# Patient Record
Sex: FEMALE | Race: OTHER | NOT HISPANIC OR LATINO
[De-identification: names, ages, dates, MRNs, and addresses within clinical notes are randomized per-mention and may not be internally consistent; named-entity substitution may affect disease eponyms.]

---

## 2018-11-29 PROBLEM — Z00.00 ENCOUNTER FOR PREVENTIVE HEALTH EXAMINATION: Status: ACTIVE | Noted: 2018-11-29

## 2019-03-13 ENCOUNTER — APPOINTMENT (OUTPATIENT)
Dept: ENDOCRINOLOGY | Facility: CLINIC | Age: 79
End: 2019-03-13

## 2019-03-21 ENCOUNTER — APPOINTMENT (OUTPATIENT)
Dept: ENDOCRINOLOGY | Facility: CLINIC | Age: 79
End: 2019-03-21

## 2020-01-01 ENCOUNTER — TRANSCRIPTION ENCOUNTER (OUTPATIENT)
Age: 80
End: 2020-01-01

## 2020-01-01 ENCOUNTER — APPOINTMENT (OUTPATIENT)
Dept: UROLOGY | Facility: CLINIC | Age: 80
End: 2020-01-01
Payer: MEDICARE

## 2020-01-01 ENCOUNTER — INPATIENT (INPATIENT)
Facility: HOSPITAL | Age: 80
LOS: 5 days | Discharge: NURSING, REHAB OR LONG-TERM CARE | DRG: 312 | End: 2020-02-05
Attending: INTERNAL MEDICINE | Admitting: INTERNAL MEDICINE
Payer: MEDICARE

## 2020-01-01 ENCOUNTER — APPOINTMENT (OUTPATIENT)
Dept: NEUROLOGY | Facility: CLINIC | Age: 80
End: 2020-01-01
Payer: MEDICARE

## 2020-01-01 ENCOUNTER — EMERGENCY (EMERGENCY)
Facility: HOSPITAL | Age: 80
LOS: 1 days | Discharge: HOME | End: 2020-01-01
Attending: STUDENT IN AN ORGANIZED HEALTH CARE EDUCATION/TRAINING PROGRAM
Payer: MEDICARE

## 2020-01-01 ENCOUNTER — INPATIENT (INPATIENT)
Facility: HOSPITAL | Age: 80
LOS: 2 days | Discharge: HOME | DRG: 69 | End: 2020-03-13
Attending: INTERNAL MEDICINE | Admitting: INTERNAL MEDICINE
Payer: MEDICARE

## 2020-01-01 ENCOUNTER — APPOINTMENT (OUTPATIENT)
Dept: NEUROLOGY | Facility: CLINIC | Age: 80
End: 2020-01-01

## 2020-01-01 ENCOUNTER — EMERGENCY (EMERGENCY)
Facility: HOSPITAL | Age: 80
LOS: 1 days | Discharge: HOME | End: 2020-01-01
Attending: EMERGENCY MEDICINE
Payer: MEDICARE

## 2020-01-01 ENCOUNTER — INPATIENT (INPATIENT)
Facility: HOSPITAL | Age: 80
LOS: 3 days | Discharge: NURSING, REHAB OR LONG-TERM CARE | DRG: 948 | End: 2020-11-30
Attending: STUDENT IN AN ORGANIZED HEALTH CARE EDUCATION/TRAINING PROGRAM | Admitting: STUDENT IN AN ORGANIZED HEALTH CARE EDUCATION/TRAINING PROGRAM
Payer: MEDICARE

## 2020-01-01 ENCOUNTER — INPATIENT (INPATIENT)
Facility: HOSPITAL | Age: 80
LOS: 7 days | Discharge: HOME | DRG: 291 | End: 2020-11-25
Attending: INTERNAL MEDICINE | Admitting: INTERNAL MEDICINE
Payer: MEDICARE

## 2020-01-01 ENCOUNTER — APPOINTMENT (OUTPATIENT)
Age: 80
End: 2020-01-01
Payer: MEDICARE

## 2020-01-01 VITALS
DIASTOLIC BLOOD PRESSURE: 63 MMHG | HEART RATE: 70 BPM | RESPIRATION RATE: 20 BRPM | SYSTOLIC BLOOD PRESSURE: 131 MMHG | OXYGEN SATURATION: 88 % | HEIGHT: 55 IN | TEMPERATURE: 98 F

## 2020-01-01 VITALS
HEART RATE: 60 BPM | BODY MASS INDEX: 40.93 KG/M2 | WEIGHT: 195 LBS | HEIGHT: 58 IN | DIASTOLIC BLOOD PRESSURE: 60 MMHG | TEMPERATURE: 97 F | SYSTOLIC BLOOD PRESSURE: 117 MMHG

## 2020-01-01 VITALS
HEART RATE: 78 BPM | OXYGEN SATURATION: 99 % | DIASTOLIC BLOOD PRESSURE: 96 MMHG | RESPIRATION RATE: 18 BRPM | TEMPERATURE: 99 F | SYSTOLIC BLOOD PRESSURE: 142 MMHG

## 2020-01-01 VITALS
HEART RATE: 87 BPM | OXYGEN SATURATION: 95 % | SYSTOLIC BLOOD PRESSURE: 106 MMHG | RESPIRATION RATE: 18 BRPM | TEMPERATURE: 98 F | DIASTOLIC BLOOD PRESSURE: 61 MMHG

## 2020-01-01 VITALS
OXYGEN SATURATION: 94 % | RESPIRATION RATE: 18 BRPM | DIASTOLIC BLOOD PRESSURE: 72 MMHG | WEIGHT: 210.1 LBS | HEIGHT: 55 IN | SYSTOLIC BLOOD PRESSURE: 143 MMHG | HEART RATE: 93 BPM

## 2020-01-01 VITALS
RESPIRATION RATE: 18 BRPM | DIASTOLIC BLOOD PRESSURE: 75 MMHG | SYSTOLIC BLOOD PRESSURE: 115 MMHG | TEMPERATURE: 98 F | HEART RATE: 78 BPM | OXYGEN SATURATION: 99 %

## 2020-01-01 VITALS
RESPIRATION RATE: 18 BRPM | SYSTOLIC BLOOD PRESSURE: 92 MMHG | OXYGEN SATURATION: 96 % | HEART RATE: 56 BPM | DIASTOLIC BLOOD PRESSURE: 41 MMHG | TEMPERATURE: 98 F

## 2020-01-01 VITALS
OXYGEN SATURATION: 99 % | HEIGHT: 55 IN | DIASTOLIC BLOOD PRESSURE: 76 MMHG | RESPIRATION RATE: 18 BRPM | TEMPERATURE: 98 F | HEART RATE: 82 BPM | SYSTOLIC BLOOD PRESSURE: 123 MMHG | WEIGHT: 220.02 LBS

## 2020-01-01 VITALS
HEART RATE: 63 BPM | DIASTOLIC BLOOD PRESSURE: 74 MMHG | SYSTOLIC BLOOD PRESSURE: 176 MMHG | RESPIRATION RATE: 17 BRPM | OXYGEN SATURATION: 96 % | WEIGHT: 199.96 LBS | TEMPERATURE: 98 F

## 2020-01-01 VITALS — HEART RATE: 84 BPM | DIASTOLIC BLOOD PRESSURE: 63 MMHG | WEIGHT: 195 LBS | SYSTOLIC BLOOD PRESSURE: 119 MMHG

## 2020-01-01 VITALS
SYSTOLIC BLOOD PRESSURE: 116 MMHG | HEART RATE: 73 BPM | RESPIRATION RATE: 18 BRPM | TEMPERATURE: 99 F | DIASTOLIC BLOOD PRESSURE: 72 MMHG | OXYGEN SATURATION: 97 %

## 2020-01-01 VITALS
SYSTOLIC BLOOD PRESSURE: 126 MMHG | HEIGHT: 55 IN | OXYGEN SATURATION: 95 % | TEMPERATURE: 98 F | RESPIRATION RATE: 17 BRPM | DIASTOLIC BLOOD PRESSURE: 76 MMHG | HEART RATE: 116 BPM

## 2020-01-01 VITALS — TEMPERATURE: 97.8 F

## 2020-01-01 VITALS
OXYGEN SATURATION: 95 % | TEMPERATURE: 98 F | SYSTOLIC BLOOD PRESSURE: 141 MMHG | RESPIRATION RATE: 18 BRPM | HEART RATE: 63 BPM | DIASTOLIC BLOOD PRESSURE: 45 MMHG

## 2020-01-01 VITALS — DIASTOLIC BLOOD PRESSURE: 54 MMHG | SYSTOLIC BLOOD PRESSURE: 97 MMHG

## 2020-01-01 DIAGNOSIS — I10 ESSENTIAL (PRIMARY) HYPERTENSION: ICD-10-CM

## 2020-01-01 DIAGNOSIS — F03.90 UNSPECIFIED DEMENTIA, UNSPECIFIED SEVERITY, WITHOUT BEHAVIORAL DISTURBANCE, PSYCHOTIC DISTURBANCE, MOOD DISTURBANCE, AND ANXIETY: ICD-10-CM

## 2020-01-01 DIAGNOSIS — F03.90 UNSPECIFIED DEMENTIA WITHOUT BEHAVIORAL DISTURBANCE: ICD-10-CM

## 2020-01-01 DIAGNOSIS — E11.9 TYPE 2 DIABETES MELLITUS WITHOUT COMPLICATIONS: ICD-10-CM

## 2020-01-01 DIAGNOSIS — J18.9 PNEUMONIA, UNSPECIFIED ORGANISM: ICD-10-CM

## 2020-01-01 DIAGNOSIS — I48.91 UNSPECIFIED ATRIAL FIBRILLATION: ICD-10-CM

## 2020-01-01 DIAGNOSIS — Z80.42 FAMILY HISTORY OF MALIGNANT NEOPLASM OF PROSTATE: ICD-10-CM

## 2020-01-01 DIAGNOSIS — G45.9 TRANSIENT CEREBRAL ISCHEMIC ATTACK, UNSPECIFIED: ICD-10-CM

## 2020-01-01 DIAGNOSIS — E11.9 TYPE 2 DIABETES MELLITUS W/OUT COMPLICATIONS: ICD-10-CM

## 2020-01-01 DIAGNOSIS — Z83.3 FAMILY HISTORY OF DIABETES MELLITUS: ICD-10-CM

## 2020-01-01 DIAGNOSIS — I50.33 ACUTE ON CHRONIC DIASTOLIC (CONGESTIVE) HEART FAILURE: ICD-10-CM

## 2020-01-01 DIAGNOSIS — R79.1 ABNORMAL COAGULATION PROFILE: ICD-10-CM

## 2020-01-01 DIAGNOSIS — Z98.891 HISTORY OF UTERINE SCAR FROM PREVIOUS SURGERY: Chronic | ICD-10-CM

## 2020-01-01 DIAGNOSIS — Z78.9 OTHER SPECIFIED HEALTH STATUS: ICD-10-CM

## 2020-01-01 DIAGNOSIS — N18.9 CHRONIC KIDNEY DISEASE, UNSPECIFIED: ICD-10-CM

## 2020-01-01 DIAGNOSIS — R60.9 EDEMA, UNSPECIFIED: ICD-10-CM

## 2020-01-01 DIAGNOSIS — Z02.9 ENCOUNTER FOR ADMINISTRATIVE EXAMINATIONS, UNSPECIFIED: ICD-10-CM

## 2020-01-01 DIAGNOSIS — E87.6 HYPOKALEMIA: ICD-10-CM

## 2020-01-01 DIAGNOSIS — E78.5 HYPERLIPIDEMIA, UNSPECIFIED: ICD-10-CM
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
## 2020-01-01 DIAGNOSIS — Z29.9 ENCOUNTER FOR PROPHYLACTIC MEASURES, UNSPECIFIED: ICD-10-CM

## 2020-01-01 DIAGNOSIS — Z82.3 FAMILY HISTORY OF STROKE: ICD-10-CM

## 2020-01-01 DIAGNOSIS — N28.89 OTHER SPECIFIED DISORDERS OF KIDNEY AND URETER: ICD-10-CM

## 2020-01-01 DIAGNOSIS — Z82.49 FAMILY HISTORY OF ISCHEMIC HEART DISEASE AND OTHER DISEASES OF THE CIRCULATORY SYSTEM: ICD-10-CM

## 2020-01-01 DIAGNOSIS — R55 SYNCOPE AND COLLAPSE: ICD-10-CM

## 2020-01-01 DIAGNOSIS — M25.562 PAIN IN LEFT KNEE: ICD-10-CM

## 2020-01-01 DIAGNOSIS — J96.00 ACUTE RESPIRATORY FAILURE, UNSPECIFIED WHETHER WITH HYPOXIA OR HYPERCAPNIA: ICD-10-CM

## 2020-01-01 DIAGNOSIS — F01.50 VASCULAR DEMENTIA W/OUT BEHAVIORAL DISTURBANCE: ICD-10-CM

## 2020-01-01 DIAGNOSIS — G31.84 MILD COGNITIVE IMPAIRMENT, SO STATED: ICD-10-CM

## 2020-01-01 DIAGNOSIS — Z82.0 FAMILY HISTORY OF EPILEPSY AND OTHER DISEASES OF THE NERVOUS SYSTEM: ICD-10-CM

## 2020-01-01 DIAGNOSIS — E78.00 PURE HYPERCHOLESTEROLEMIA, UNSPECIFIED: ICD-10-CM

## 2020-01-01 DIAGNOSIS — R53.1 WEAKNESS: ICD-10-CM

## 2020-01-01 DIAGNOSIS — I50.9 HEART FAILURE, UNSPECIFIED: ICD-10-CM

## 2020-01-01 LAB
-  AMIKACIN: SIGNIFICANT CHANGE UP
-  AMPICILLIN/SULBACTAM: SIGNIFICANT CHANGE UP
-  AMPICILLIN: SIGNIFICANT CHANGE UP
-  AMPICILLIN: SIGNIFICANT CHANGE UP
-  AZTREONAM: SIGNIFICANT CHANGE UP
-  CEFAZOLIN: SIGNIFICANT CHANGE UP
-  CEFEPIME: SIGNIFICANT CHANGE UP
-  CEFOXITIN: SIGNIFICANT CHANGE UP
-  CEFTRIAXONE: SIGNIFICANT CHANGE UP
-  CIPROFLOXACIN: SIGNIFICANT CHANGE UP
-  CIPROFLOXACIN: SIGNIFICANT CHANGE UP
-  GENTAMICIN: SIGNIFICANT CHANGE UP
-  IMIPENEM: SIGNIFICANT CHANGE UP
-  LEVOFLOXACIN: SIGNIFICANT CHANGE UP
-  LEVOFLOXACIN: SIGNIFICANT CHANGE UP
-  MEROPENEM: SIGNIFICANT CHANGE UP
-  NITROFURANTOIN: SIGNIFICANT CHANGE UP
-  NITROFURANTOIN: SIGNIFICANT CHANGE UP
-  PIPERACILLIN/TAZOBACTAM: SIGNIFICANT CHANGE UP
-  TETRACYCLINE: SIGNIFICANT CHANGE UP
-  TIGECYCLINE: SIGNIFICANT CHANGE UP
-  TOBRAMYCIN: SIGNIFICANT CHANGE UP
-  TRIMETHOPRIM/SULFAMETHOXAZOLE: SIGNIFICANT CHANGE UP
-  VANCOMYCIN: SIGNIFICANT CHANGE UP
24R-OH-CALCIDIOL SERPL-MCNC: 22.1 NG/ML — LOW (ref 30–80)
24R-OH-CALCIDIOL SERPL-MCNC: 54.1 NG/ML — SIGNIFICANT CHANGE UP (ref 30–80)
A1C WITH ESTIMATED AVERAGE GLUCOSE RESULT: 8.9 % — HIGH (ref 4–5.6)
ALBUMIN SERPL ELPH-MCNC: 2.2 G/DL — LOW (ref 3.5–5)
ALBUMIN SERPL ELPH-MCNC: 2.3 G/DL — LOW (ref 3.5–5)
ALBUMIN SERPL ELPH-MCNC: 2.7 G/DL — LOW (ref 3.5–5)
ALBUMIN SERPL ELPH-MCNC: 3.1 G/DL — LOW (ref 3.5–5)
ALBUMIN SERPL ELPH-MCNC: 3.1 G/DL — LOW (ref 3.5–5)
ALBUMIN SERPL ELPH-MCNC: 3.2 G/DL — LOW (ref 3.5–5)
ALBUMIN SERPL ELPH-MCNC: 3.2 G/DL — LOW (ref 3.5–5)
ALBUMIN SERPL ELPH-MCNC: 3.3 G/DL — LOW (ref 3.5–5)
ALBUMIN SERPL ELPH-MCNC: 3.4 G/DL — LOW (ref 3.5–5)
ALP SERPL-CCNC: 46 U/L — SIGNIFICANT CHANGE UP (ref 40–120)
ALP SERPL-CCNC: 51 U/L — SIGNIFICANT CHANGE UP (ref 40–120)
ALP SERPL-CCNC: 70 U/L — SIGNIFICANT CHANGE UP (ref 40–120)
ALP SERPL-CCNC: 73 U/L — SIGNIFICANT CHANGE UP (ref 40–120)
ALP SERPL-CCNC: 75 U/L — SIGNIFICANT CHANGE UP (ref 40–120)
ALP SERPL-CCNC: 76 U/L — SIGNIFICANT CHANGE UP (ref 40–120)
ALP SERPL-CCNC: 76 U/L — SIGNIFICANT CHANGE UP (ref 40–120)
ALP SERPL-CCNC: 78 U/L — SIGNIFICANT CHANGE UP (ref 40–120)
ALP SERPL-CCNC: 81 U/L — SIGNIFICANT CHANGE UP (ref 40–120)
ALP SERPL-CCNC: 83 U/L — SIGNIFICANT CHANGE UP (ref 40–120)
ALP SERPL-CCNC: 85 U/L — SIGNIFICANT CHANGE UP (ref 40–120)
ALP SERPL-CCNC: 87 U/L — SIGNIFICANT CHANGE UP (ref 40–120)
ALP SERPL-CCNC: 88 U/L — SIGNIFICANT CHANGE UP (ref 40–120)
ALP SERPL-CCNC: 93 U/L — SIGNIFICANT CHANGE UP (ref 40–120)
ALP SERPL-CCNC: 98 U/L — SIGNIFICANT CHANGE UP (ref 40–120)
ALT FLD-CCNC: 10 U/L DA — SIGNIFICANT CHANGE UP (ref 10–60)
ALT FLD-CCNC: 12 U/L DA — SIGNIFICANT CHANGE UP (ref 10–60)
ALT FLD-CCNC: 14 U/L DA — SIGNIFICANT CHANGE UP (ref 10–60)
ALT FLD-CCNC: 14 U/L DA — SIGNIFICANT CHANGE UP (ref 10–60)
ALT FLD-CCNC: 15 U/L DA — SIGNIFICANT CHANGE UP (ref 10–60)
ALT FLD-CCNC: 15 U/L DA — SIGNIFICANT CHANGE UP (ref 10–60)
ALT FLD-CCNC: 16 U/L DA — SIGNIFICANT CHANGE UP (ref 10–60)
ALT FLD-CCNC: 16 U/L DA — SIGNIFICANT CHANGE UP (ref 10–60)
ALT FLD-CCNC: 17 U/L DA — SIGNIFICANT CHANGE UP (ref 10–60)
ALT FLD-CCNC: 17 U/L DA — SIGNIFICANT CHANGE UP (ref 10–60)
ALT FLD-CCNC: 18 U/L DA — SIGNIFICANT CHANGE UP (ref 10–60)
ALT FLD-CCNC: 19 U/L DA — SIGNIFICANT CHANGE UP (ref 10–60)
ALT FLD-CCNC: 20 U/L DA — SIGNIFICANT CHANGE UP (ref 10–60)
ALT FLD-CCNC: 20 U/L DA — SIGNIFICANT CHANGE UP (ref 10–60)
ALT FLD-CCNC: 21 U/L DA — SIGNIFICANT CHANGE UP (ref 10–60)
ANION GAP SERPL CALC-SCNC: 10 MMOL/L — SIGNIFICANT CHANGE UP (ref 5–17)
ANION GAP SERPL CALC-SCNC: 10 MMOL/L — SIGNIFICANT CHANGE UP (ref 5–17)
ANION GAP SERPL CALC-SCNC: 12 MMOL/L — SIGNIFICANT CHANGE UP (ref 5–17)
ANION GAP SERPL CALC-SCNC: 12 MMOL/L — SIGNIFICANT CHANGE UP (ref 5–17)
ANION GAP SERPL CALC-SCNC: 13 MMOL/L — SIGNIFICANT CHANGE UP (ref 5–17)
ANION GAP SERPL CALC-SCNC: 17 MMOL/L
ANION GAP SERPL CALC-SCNC: 3 MMOL/L — LOW (ref 5–17)
ANION GAP SERPL CALC-SCNC: 4 MMOL/L — LOW (ref 5–17)
ANION GAP SERPL CALC-SCNC: 6 MMOL/L — SIGNIFICANT CHANGE UP (ref 5–17)
ANION GAP SERPL CALC-SCNC: 7 MMOL/L — SIGNIFICANT CHANGE UP (ref 5–17)
ANION GAP SERPL CALC-SCNC: 7 MMOL/L — SIGNIFICANT CHANGE UP (ref 5–17)
ANION GAP SERPL CALC-SCNC: 8 MMOL/L — SIGNIFICANT CHANGE UP (ref 5–17)
ANION GAP SERPL CALC-SCNC: 9 MMOL/L — SIGNIFICANT CHANGE UP (ref 5–17)
APPEARANCE UR: ABNORMAL
APPEARANCE UR: CLEAR — SIGNIFICANT CHANGE UP
APTT BLD: 133.1 SEC — CRITICAL HIGH (ref 27.5–35.5)
APTT BLD: 32.4 SEC — SIGNIFICANT CHANGE UP (ref 27.5–36.3)
APTT BLD: 35.6 SEC — SIGNIFICANT CHANGE UP (ref 27.5–36.3)
APTT BLD: 38.2 SEC — HIGH (ref 27.5–35.5)
APTT BLD: 39.4 SEC — HIGH (ref 27.5–35.5)
AST SERPL-CCNC: 10 U/L — SIGNIFICANT CHANGE UP (ref 10–40)
AST SERPL-CCNC: 12 U/L — SIGNIFICANT CHANGE UP (ref 10–40)
AST SERPL-CCNC: 13 U/L — SIGNIFICANT CHANGE UP (ref 10–40)
AST SERPL-CCNC: 13 U/L — SIGNIFICANT CHANGE UP (ref 10–40)
AST SERPL-CCNC: 14 U/L — SIGNIFICANT CHANGE UP (ref 10–40)
AST SERPL-CCNC: 15 U/L — SIGNIFICANT CHANGE UP (ref 10–40)
AST SERPL-CCNC: 16 U/L — SIGNIFICANT CHANGE UP (ref 10–40)
AST SERPL-CCNC: 16 U/L — SIGNIFICANT CHANGE UP (ref 10–40)
AST SERPL-CCNC: 17 U/L — SIGNIFICANT CHANGE UP (ref 10–40)
AST SERPL-CCNC: 19 U/L — SIGNIFICANT CHANGE UP (ref 10–40)
AST SERPL-CCNC: 21 U/L — SIGNIFICANT CHANGE UP (ref 10–40)
AST SERPL-CCNC: 21 U/L — SIGNIFICANT CHANGE UP (ref 10–40)
AST SERPL-CCNC: 38 U/L — SIGNIFICANT CHANGE UP (ref 10–40)
BACTERIA # UR AUTO: ABNORMAL /HPF
BASE EXCESS BLDA CALC-SCNC: 5.1 MMOL/L — HIGH (ref -2–2)
BASE EXCESS BLDV CALC-SCNC: -0.5 MMOL/L — SIGNIFICANT CHANGE UP (ref -2–2)
BASOPHILS # BLD AUTO: 0.01 K/UL — SIGNIFICANT CHANGE UP (ref 0–0.2)
BASOPHILS # BLD AUTO: 0.02 K/UL — SIGNIFICANT CHANGE UP (ref 0–0.2)
BASOPHILS # BLD AUTO: 0.03 K/UL — SIGNIFICANT CHANGE UP (ref 0–0.2)
BASOPHILS # BLD AUTO: 0.03 K/UL — SIGNIFICANT CHANGE UP (ref 0–0.2)
BASOPHILS # BLD AUTO: 0.04 K/UL — SIGNIFICANT CHANGE UP (ref 0–0.2)
BASOPHILS NFR BLD AUTO: 0.1 % — SIGNIFICANT CHANGE UP (ref 0–2)
BASOPHILS NFR BLD AUTO: 0.2 % — SIGNIFICANT CHANGE UP (ref 0–2)
BASOPHILS NFR BLD AUTO: 0.2 % — SIGNIFICANT CHANGE UP (ref 0–2)
BASOPHILS NFR BLD AUTO: 0.3 % — SIGNIFICANT CHANGE UP (ref 0–2)
BASOPHILS NFR BLD AUTO: 0.4 % — SIGNIFICANT CHANGE UP (ref 0–2)
BASOPHILS NFR BLD AUTO: 0.4 % — SIGNIFICANT CHANGE UP (ref 0–2)
BILIRUB DIRECT SERPL-MCNC: 0.1 MG/DL — SIGNIFICANT CHANGE UP (ref 0–0.2)
BILIRUB DIRECT SERPL-MCNC: 0.2 MG/DL — SIGNIFICANT CHANGE UP (ref 0–0.2)
BILIRUB INDIRECT FLD-MCNC: 0.4 MG/DL — SIGNIFICANT CHANGE UP (ref 0.2–1)
BILIRUB INDIRECT FLD-MCNC: 0.6 MG/DL — SIGNIFICANT CHANGE UP (ref 0.2–1)
BILIRUB SERPL-MCNC: 0.4 MG/DL — SIGNIFICANT CHANGE UP (ref 0.2–1.2)
BILIRUB SERPL-MCNC: 0.4 MG/DL — SIGNIFICANT CHANGE UP (ref 0.2–1.2)
BILIRUB SERPL-MCNC: 0.5 MG/DL — SIGNIFICANT CHANGE UP (ref 0.2–1.2)
BILIRUB SERPL-MCNC: 0.6 MG/DL — SIGNIFICANT CHANGE UP (ref 0.2–1.2)
BILIRUB SERPL-MCNC: 0.6 MG/DL — SIGNIFICANT CHANGE UP (ref 0.2–1.2)
BILIRUB SERPL-MCNC: 0.7 MG/DL — SIGNIFICANT CHANGE UP (ref 0.2–1.2)
BILIRUB SERPL-MCNC: 0.7 MG/DL — SIGNIFICANT CHANGE UP (ref 0.2–1.2)
BILIRUB SERPL-MCNC: 0.8 MG/DL — SIGNIFICANT CHANGE UP (ref 0.2–1.2)
BILIRUB SERPL-MCNC: 0.9 MG/DL — SIGNIFICANT CHANGE UP (ref 0.2–1.2)
BILIRUB UR-MCNC: NEGATIVE — SIGNIFICANT CHANGE UP
BLOOD GAS COMMENTS ARTERIAL: SIGNIFICANT CHANGE UP
BLOOD GAS COMMENTS, VENOUS: SIGNIFICANT CHANGE UP
BUN SERPL-MCNC: 11 MG/DL — SIGNIFICANT CHANGE UP (ref 7–18)
BUN SERPL-MCNC: 12 MG/DL — SIGNIFICANT CHANGE UP (ref 7–18)
BUN SERPL-MCNC: 13 MG/DL — SIGNIFICANT CHANGE UP (ref 7–18)
BUN SERPL-MCNC: 14 MG/DL
BUN SERPL-MCNC: 14 MG/DL — SIGNIFICANT CHANGE UP (ref 7–18)
BUN SERPL-MCNC: 14 MG/DL — SIGNIFICANT CHANGE UP (ref 7–18)
BUN SERPL-MCNC: 15 MG/DL — SIGNIFICANT CHANGE UP (ref 7–18)
BUN SERPL-MCNC: 16 MG/DL — SIGNIFICANT CHANGE UP (ref 7–18)
BUN SERPL-MCNC: 17 MG/DL — SIGNIFICANT CHANGE UP (ref 7–18)
BUN SERPL-MCNC: 18 MG/DL — SIGNIFICANT CHANGE UP (ref 7–18)
BUN SERPL-MCNC: 19 MG/DL — HIGH (ref 7–18)
BUN SERPL-MCNC: 20 MG/DL — HIGH (ref 7–18)
BUN SERPL-MCNC: 20 MG/DL — HIGH (ref 7–18)
BUN SERPL-MCNC: 7 MG/DL — SIGNIFICANT CHANGE UP (ref 7–18)
BUN SERPL-MCNC: 8 MG/DL — SIGNIFICANT CHANGE UP (ref 7–18)
BUN SERPL-MCNC: 9 MG/DL — SIGNIFICANT CHANGE UP (ref 7–18)
CALCIUM SERPL-MCNC: 8.3 MG/DL — LOW (ref 8.4–10.5)
CALCIUM SERPL-MCNC: 8.3 MG/DL — LOW (ref 8.4–10.5)
CALCIUM SERPL-MCNC: 8.5 MG/DL — SIGNIFICANT CHANGE UP (ref 8.4–10.5)
CALCIUM SERPL-MCNC: 8.6 MG/DL — SIGNIFICANT CHANGE UP (ref 8.4–10.5)
CALCIUM SERPL-MCNC: 8.7 MG/DL — SIGNIFICANT CHANGE UP (ref 8.4–10.5)
CALCIUM SERPL-MCNC: 8.8 MG/DL — SIGNIFICANT CHANGE UP (ref 8.4–10.5)
CALCIUM SERPL-MCNC: 8.9 MG/DL — SIGNIFICANT CHANGE UP (ref 8.4–10.5)
CALCIUM SERPL-MCNC: 8.9 MG/DL — SIGNIFICANT CHANGE UP (ref 8.4–10.5)
CALCIUM SERPL-MCNC: 9 MG/DL — SIGNIFICANT CHANGE UP (ref 8.4–10.5)
CALCIUM SERPL-MCNC: 9.2 MG/DL
CALCIUM SERPL-MCNC: 9.2 MG/DL — SIGNIFICANT CHANGE UP (ref 8.4–10.5)
CHLORIDE SERPL-SCNC: 100 MMOL/L — SIGNIFICANT CHANGE UP (ref 96–108)
CHLORIDE SERPL-SCNC: 101 MMOL/L — SIGNIFICANT CHANGE UP (ref 96–108)
CHLORIDE SERPL-SCNC: 102 MMOL/L
CHLORIDE SERPL-SCNC: 102 MMOL/L — SIGNIFICANT CHANGE UP (ref 96–108)
CHLORIDE SERPL-SCNC: 103 MMOL/L — SIGNIFICANT CHANGE UP (ref 96–108)
CHLORIDE SERPL-SCNC: 103 MMOL/L — SIGNIFICANT CHANGE UP (ref 96–108)
CHLORIDE SERPL-SCNC: 104 MMOL/L — SIGNIFICANT CHANGE UP (ref 96–108)
CHLORIDE SERPL-SCNC: 104 MMOL/L — SIGNIFICANT CHANGE UP (ref 96–108)
CHLORIDE SERPL-SCNC: 105 MMOL/L — SIGNIFICANT CHANGE UP (ref 96–108)
CHLORIDE SERPL-SCNC: 106 MMOL/L — SIGNIFICANT CHANGE UP (ref 96–108)
CHLORIDE SERPL-SCNC: 109 MMOL/L — HIGH (ref 96–108)
CHLORIDE SERPL-SCNC: 95 MMOL/L — LOW (ref 96–108)
CHLORIDE SERPL-SCNC: 96 MMOL/L — SIGNIFICANT CHANGE UP (ref 96–108)
CHLORIDE SERPL-SCNC: 96 MMOL/L — SIGNIFICANT CHANGE UP (ref 96–108)
CHLORIDE SERPL-SCNC: 97 MMOL/L — SIGNIFICANT CHANGE UP (ref 96–108)
CHLORIDE SERPL-SCNC: 99 MMOL/L — SIGNIFICANT CHANGE UP (ref 96–108)
CHOLEST SERPL-MCNC: 130 MG/DL — SIGNIFICANT CHANGE UP
CHOLEST SERPL-MCNC: 154 MG/DL — SIGNIFICANT CHANGE UP (ref 10–199)
CHOLEST SERPL-MCNC: 166 MG/DL — SIGNIFICANT CHANGE UP
CHOLEST SERPL-MCNC: 269 MG/DL — HIGH (ref 10–199)
CK MB BLD-MCNC: 1.4 % — SIGNIFICANT CHANGE UP (ref 0–3.5)
CK MB BLD-MCNC: 1.4 % — SIGNIFICANT CHANGE UP (ref 0–3.5)
CK MB BLD-MCNC: <1.6 % — SIGNIFICANT CHANGE UP (ref 0–3.5)
CK MB BLD-MCNC: <2.1 % — SIGNIFICANT CHANGE UP (ref 0–3.5)
CK MB CFR SERPL CALC: 2.5 NG/ML — SIGNIFICANT CHANGE UP (ref 0–3.6)
CK MB CFR SERPL CALC: 2.9 NG/ML — SIGNIFICANT CHANGE UP (ref 0–3.6)
CK MB CFR SERPL CALC: <1 NG/ML — SIGNIFICANT CHANGE UP (ref 0–3.6)
CK MB CFR SERPL CALC: <1 NG/ML — SIGNIFICANT CHANGE UP (ref 0–3.6)
CK SERPL-CCNC: 185 U/L — SIGNIFICANT CHANGE UP (ref 21–215)
CK SERPL-CCNC: 207 U/L — SIGNIFICANT CHANGE UP (ref 21–215)
CK SERPL-CCNC: 48 U/L — SIGNIFICANT CHANGE UP (ref 21–215)
CK SERPL-CCNC: 62 U/L — SIGNIFICANT CHANGE UP (ref 21–215)
CO2 SERPL-SCNC: 24 MMOL/L
CO2 SERPL-SCNC: 24 MMOL/L — SIGNIFICANT CHANGE UP (ref 22–31)
CO2 SERPL-SCNC: 25 MMOL/L — SIGNIFICANT CHANGE UP (ref 22–31)
CO2 SERPL-SCNC: 26 MMOL/L — SIGNIFICANT CHANGE UP (ref 22–31)
CO2 SERPL-SCNC: 27 MMOL/L — SIGNIFICANT CHANGE UP (ref 22–31)
CO2 SERPL-SCNC: 27 MMOL/L — SIGNIFICANT CHANGE UP (ref 22–31)
CO2 SERPL-SCNC: 28 MMOL/L — SIGNIFICANT CHANGE UP (ref 22–31)
CO2 SERPL-SCNC: 29 MMOL/L — SIGNIFICANT CHANGE UP (ref 22–31)
CO2 SERPL-SCNC: 30 MMOL/L — SIGNIFICANT CHANGE UP (ref 22–31)
CO2 SERPL-SCNC: 31 MMOL/L — SIGNIFICANT CHANGE UP (ref 22–31)
CO2 SERPL-SCNC: 31 MMOL/L — SIGNIFICANT CHANGE UP (ref 22–31)
CO2 SERPL-SCNC: 32 MMOL/L — HIGH (ref 22–31)
CO2 SERPL-SCNC: 33 MMOL/L — HIGH (ref 22–31)
CO2 SERPL-SCNC: 34 MMOL/L — HIGH (ref 22–31)
CO2 SERPL-SCNC: 37 MMOL/L — HIGH (ref 22–31)
CO2 SERPL-SCNC: 37 MMOL/L — HIGH (ref 22–31)
COLOR SPEC: YELLOW — SIGNIFICANT CHANGE UP
CREAT SERPL-MCNC: 0.59 MG/DL — SIGNIFICANT CHANGE UP (ref 0.5–1.3)
CREAT SERPL-MCNC: 0.64 MG/DL — SIGNIFICANT CHANGE UP (ref 0.5–1.3)
CREAT SERPL-MCNC: 0.64 MG/DL — SIGNIFICANT CHANGE UP (ref 0.5–1.3)
CREAT SERPL-MCNC: 0.65 MG/DL — SIGNIFICANT CHANGE UP (ref 0.5–1.3)
CREAT SERPL-MCNC: 0.69 MG/DL — SIGNIFICANT CHANGE UP (ref 0.5–1.3)
CREAT SERPL-MCNC: 0.7 MG/DL — SIGNIFICANT CHANGE UP (ref 0.5–1.3)
CREAT SERPL-MCNC: 0.72 MG/DL — SIGNIFICANT CHANGE UP (ref 0.5–1.3)
CREAT SERPL-MCNC: 0.75 MG/DL — SIGNIFICANT CHANGE UP (ref 0.5–1.3)
CREAT SERPL-MCNC: 0.77 MG/DL — SIGNIFICANT CHANGE UP (ref 0.5–1.3)
CREAT SERPL-MCNC: 0.79 MG/DL — SIGNIFICANT CHANGE UP (ref 0.5–1.3)
CREAT SERPL-MCNC: 0.81 MG/DL — SIGNIFICANT CHANGE UP (ref 0.5–1.3)
CREAT SERPL-MCNC: 0.83 MG/DL — SIGNIFICANT CHANGE UP (ref 0.5–1.3)
CREAT SERPL-MCNC: 0.84 MG/DL — SIGNIFICANT CHANGE UP (ref 0.5–1.3)
CREAT SERPL-MCNC: 0.85 MG/DL — SIGNIFICANT CHANGE UP (ref 0.5–1.3)
CREAT SERPL-MCNC: 0.87 MG/DL
CREAT SERPL-MCNC: 0.91 MG/DL — SIGNIFICANT CHANGE UP (ref 0.5–1.3)
CREAT SERPL-MCNC: 0.95 MG/DL — SIGNIFICANT CHANGE UP (ref 0.5–1.3)
CREAT SERPL-MCNC: 1.03 MG/DL — SIGNIFICANT CHANGE UP (ref 0.5–1.3)
CREAT SERPL-MCNC: 1.04 MG/DL — SIGNIFICANT CHANGE UP (ref 0.5–1.3)
CREAT SERPL-MCNC: 1.06 MG/DL — SIGNIFICANT CHANGE UP (ref 0.5–1.3)
CREAT SERPL-MCNC: 1.12 MG/DL — SIGNIFICANT CHANGE UP (ref 0.5–1.3)
CREAT SERPL-MCNC: 1.19 MG/DL — SIGNIFICANT CHANGE UP (ref 0.5–1.3)
CREAT SERPL-MCNC: 1.38 MG/DL — HIGH (ref 0.5–1.3)
CRP SERPL-MCNC: 1.28 MG/DL — HIGH (ref 0–0.4)
CRP SERPL-MCNC: 26.5 MG/DL — HIGH (ref 0–0.4)
CULTURE RESULTS: SIGNIFICANT CHANGE UP
D DIMER BLD IA.RAPID-MCNC: 201 NG/ML DDU — SIGNIFICANT CHANGE UP
DIFF PNL FLD: ABNORMAL
DIFF PNL FLD: NEGATIVE — SIGNIFICANT CHANGE UP
EOSINOPHIL # BLD AUTO: 0 K/UL — SIGNIFICANT CHANGE UP (ref 0–0.5)
EOSINOPHIL # BLD AUTO: 0.01 K/UL — SIGNIFICANT CHANGE UP (ref 0–0.5)
EOSINOPHIL # BLD AUTO: 0.01 K/UL — SIGNIFICANT CHANGE UP (ref 0–0.5)
EOSINOPHIL NFR BLD AUTO: 0 % — SIGNIFICANT CHANGE UP (ref 0–6)
EOSINOPHIL NFR BLD AUTO: 0.1 % — SIGNIFICANT CHANGE UP (ref 0–6)
EOSINOPHIL NFR BLD AUTO: 0.1 % — SIGNIFICANT CHANGE UP (ref 0–6)
EPI CELLS # UR: ABNORMAL /HPF
ERYTHROCYTE [SEDIMENTATION RATE] IN BLOOD: 36 MM/HR — HIGH (ref 0–20)
ERYTHROCYTE [SEDIMENTATION RATE] IN BLOOD: 75 MM/HR — HIGH (ref 0–20)
ESTIMATED AVERAGE GLUCOSE: 209 MG/DL — HIGH (ref 68–114)
FERRITIN SERPL-MCNC: 442 NG/ML — HIGH (ref 15–150)
FERRITIN SERPL-MCNC: 62 NG/ML — SIGNIFICANT CHANGE UP (ref 15–150)
FLU A RESULT: SIGNIFICANT CHANGE UP
FLU A RESULT: SIGNIFICANT CHANGE UP
FLUAV AG NPH QL: SIGNIFICANT CHANGE UP
FLUBV AG NPH QL: SIGNIFICANT CHANGE UP
FOLATE SERPL-MCNC: 6.7 NG/ML — SIGNIFICANT CHANGE UP
FOLATE SERPL-MCNC: 7.5 NG/ML — SIGNIFICANT CHANGE UP
GLUCOSE BLDC GLUCOMTR-MCNC: 111 MG/DL — HIGH (ref 70–99)
GLUCOSE BLDC GLUCOMTR-MCNC: 120 MG/DL — HIGH (ref 70–99)
GLUCOSE BLDC GLUCOMTR-MCNC: 124 MG/DL — HIGH (ref 70–99)
GLUCOSE BLDC GLUCOMTR-MCNC: 129 MG/DL — HIGH (ref 70–99)
GLUCOSE BLDC GLUCOMTR-MCNC: 142 MG/DL — HIGH (ref 70–99)
GLUCOSE BLDC GLUCOMTR-MCNC: 146 MG/DL — HIGH (ref 70–99)
GLUCOSE BLDC GLUCOMTR-MCNC: 152 MG/DL — HIGH (ref 70–99)
GLUCOSE BLDC GLUCOMTR-MCNC: 154 MG/DL — HIGH (ref 70–99)
GLUCOSE BLDC GLUCOMTR-MCNC: 158 MG/DL — HIGH (ref 70–99)
GLUCOSE BLDC GLUCOMTR-MCNC: 165 MG/DL — HIGH (ref 70–99)
GLUCOSE BLDC GLUCOMTR-MCNC: 167 MG/DL — HIGH (ref 70–99)
GLUCOSE BLDC GLUCOMTR-MCNC: 167 MG/DL — HIGH (ref 70–99)
GLUCOSE BLDC GLUCOMTR-MCNC: 168 MG/DL — HIGH (ref 70–99)
GLUCOSE BLDC GLUCOMTR-MCNC: 168 MG/DL — HIGH (ref 70–99)
GLUCOSE BLDC GLUCOMTR-MCNC: 170 MG/DL — HIGH (ref 70–99)
GLUCOSE BLDC GLUCOMTR-MCNC: 173 MG/DL — HIGH (ref 70–99)
GLUCOSE BLDC GLUCOMTR-MCNC: 173 MG/DL — HIGH (ref 70–99)
GLUCOSE BLDC GLUCOMTR-MCNC: 176 MG/DL — HIGH (ref 70–99)
GLUCOSE BLDC GLUCOMTR-MCNC: 177 MG/DL — HIGH (ref 70–99)
GLUCOSE BLDC GLUCOMTR-MCNC: 178 MG/DL — HIGH (ref 70–99)
GLUCOSE BLDC GLUCOMTR-MCNC: 180 MG/DL — HIGH (ref 70–99)
GLUCOSE BLDC GLUCOMTR-MCNC: 180 MG/DL — HIGH (ref 70–99)
GLUCOSE BLDC GLUCOMTR-MCNC: 181 MG/DL — HIGH (ref 70–99)
GLUCOSE BLDC GLUCOMTR-MCNC: 184 MG/DL — HIGH (ref 70–99)
GLUCOSE BLDC GLUCOMTR-MCNC: 184 MG/DL — HIGH (ref 70–99)
GLUCOSE BLDC GLUCOMTR-MCNC: 188 MG/DL — HIGH (ref 70–99)
GLUCOSE BLDC GLUCOMTR-MCNC: 189 MG/DL — HIGH (ref 70–99)
GLUCOSE BLDC GLUCOMTR-MCNC: 190 MG/DL — HIGH (ref 70–99)
GLUCOSE BLDC GLUCOMTR-MCNC: 192 MG/DL — HIGH (ref 70–99)
GLUCOSE BLDC GLUCOMTR-MCNC: 192 MG/DL — HIGH (ref 70–99)
GLUCOSE BLDC GLUCOMTR-MCNC: 195 MG/DL — HIGH (ref 70–99)
GLUCOSE BLDC GLUCOMTR-MCNC: 196 MG/DL — HIGH (ref 70–99)
GLUCOSE BLDC GLUCOMTR-MCNC: 196 MG/DL — HIGH (ref 70–99)
GLUCOSE BLDC GLUCOMTR-MCNC: 199 MG/DL — HIGH (ref 70–99)
GLUCOSE BLDC GLUCOMTR-MCNC: 203 MG/DL — HIGH (ref 70–99)
GLUCOSE BLDC GLUCOMTR-MCNC: 203 MG/DL — HIGH (ref 70–99)
GLUCOSE BLDC GLUCOMTR-MCNC: 205 MG/DL — HIGH (ref 70–99)
GLUCOSE BLDC GLUCOMTR-MCNC: 208 MG/DL — HIGH (ref 70–99)
GLUCOSE BLDC GLUCOMTR-MCNC: 209 MG/DL — HIGH (ref 70–99)
GLUCOSE BLDC GLUCOMTR-MCNC: 214 MG/DL — HIGH (ref 70–99)
GLUCOSE BLDC GLUCOMTR-MCNC: 214 MG/DL — HIGH (ref 70–99)
GLUCOSE BLDC GLUCOMTR-MCNC: 222 MG/DL — HIGH (ref 70–99)
GLUCOSE BLDC GLUCOMTR-MCNC: 223 MG/DL — HIGH (ref 70–99)
GLUCOSE BLDC GLUCOMTR-MCNC: 225 MG/DL — HIGH (ref 70–99)
GLUCOSE BLDC GLUCOMTR-MCNC: 232 MG/DL — HIGH (ref 70–99)
GLUCOSE BLDC GLUCOMTR-MCNC: 247 MG/DL — HIGH (ref 70–99)
GLUCOSE BLDC GLUCOMTR-MCNC: 251 MG/DL — HIGH (ref 70–99)
GLUCOSE BLDC GLUCOMTR-MCNC: 252 MG/DL — HIGH (ref 70–99)
GLUCOSE BLDC GLUCOMTR-MCNC: 252 MG/DL — HIGH (ref 70–99)
GLUCOSE BLDC GLUCOMTR-MCNC: 261 MG/DL — HIGH (ref 70–99)
GLUCOSE BLDC GLUCOMTR-MCNC: 265 MG/DL — HIGH (ref 70–99)
GLUCOSE BLDC GLUCOMTR-MCNC: 265 MG/DL — HIGH (ref 70–99)
GLUCOSE BLDC GLUCOMTR-MCNC: 266 MG/DL — HIGH (ref 70–99)
GLUCOSE BLDC GLUCOMTR-MCNC: 268 MG/DL — HIGH (ref 70–99)
GLUCOSE BLDC GLUCOMTR-MCNC: 275 MG/DL — HIGH (ref 70–99)
GLUCOSE BLDC GLUCOMTR-MCNC: 278 MG/DL — HIGH (ref 70–99)
GLUCOSE BLDC GLUCOMTR-MCNC: 284 MG/DL — HIGH (ref 70–99)
GLUCOSE BLDC GLUCOMTR-MCNC: 287 MG/DL — HIGH (ref 70–99)
GLUCOSE BLDC GLUCOMTR-MCNC: 290 MG/DL — HIGH (ref 70–99)
GLUCOSE BLDC GLUCOMTR-MCNC: 290 MG/DL — HIGH (ref 70–99)
GLUCOSE BLDC GLUCOMTR-MCNC: 305 MG/DL — HIGH (ref 70–99)
GLUCOSE BLDC GLUCOMTR-MCNC: 307 MG/DL — HIGH (ref 70–99)
GLUCOSE BLDC GLUCOMTR-MCNC: 310 MG/DL — HIGH (ref 70–99)
GLUCOSE BLDC GLUCOMTR-MCNC: 310 MG/DL — HIGH (ref 70–99)
GLUCOSE BLDC GLUCOMTR-MCNC: 338 MG/DL — HIGH (ref 70–99)
GLUCOSE BLDC GLUCOMTR-MCNC: 350 MG/DL — HIGH (ref 70–99)
GLUCOSE BLDC GLUCOMTR-MCNC: 362 MG/DL — HIGH (ref 70–99)
GLUCOSE BLDC GLUCOMTR-MCNC: 380 MG/DL — HIGH (ref 70–99)
GLUCOSE BLDC GLUCOMTR-MCNC: 382 MG/DL — HIGH (ref 70–99)
GLUCOSE BLDC GLUCOMTR-MCNC: 92 MG/DL — SIGNIFICANT CHANGE UP (ref 70–99)
GLUCOSE BLDC GLUCOMTR-MCNC: 98 MG/DL — SIGNIFICANT CHANGE UP (ref 70–99)
GLUCOSE SERPL-MCNC: 101 MG/DL — HIGH (ref 70–99)
GLUCOSE SERPL-MCNC: 111 MG/DL — HIGH (ref 70–99)
GLUCOSE SERPL-MCNC: 123 MG/DL — HIGH (ref 70–99)
GLUCOSE SERPL-MCNC: 149 MG/DL — HIGH (ref 70–99)
GLUCOSE SERPL-MCNC: 168 MG/DL — HIGH (ref 70–99)
GLUCOSE SERPL-MCNC: 175 MG/DL — HIGH (ref 70–99)
GLUCOSE SERPL-MCNC: 178 MG/DL — HIGH (ref 70–99)
GLUCOSE SERPL-MCNC: 186 MG/DL — HIGH (ref 70–99)
GLUCOSE SERPL-MCNC: 189 MG/DL — HIGH (ref 70–99)
GLUCOSE SERPL-MCNC: 190 MG/DL — HIGH (ref 70–99)
GLUCOSE SERPL-MCNC: 192 MG/DL — HIGH (ref 70–99)
GLUCOSE SERPL-MCNC: 204 MG/DL — HIGH (ref 70–99)
GLUCOSE SERPL-MCNC: 217 MG/DL — HIGH (ref 70–99)
GLUCOSE SERPL-MCNC: 218 MG/DL — HIGH (ref 70–99)
GLUCOSE SERPL-MCNC: 219 MG/DL — HIGH (ref 70–99)
GLUCOSE SERPL-MCNC: 220 MG/DL
GLUCOSE SERPL-MCNC: 237 MG/DL — HIGH (ref 70–99)
GLUCOSE SERPL-MCNC: 250 MG/DL — HIGH (ref 70–99)
GLUCOSE SERPL-MCNC: 264 MG/DL — HIGH (ref 70–99)
GLUCOSE SERPL-MCNC: 265 MG/DL — HIGH (ref 70–99)
GLUCOSE SERPL-MCNC: 282 MG/DL — HIGH (ref 70–99)
GLUCOSE SERPL-MCNC: 295 MG/DL — HIGH (ref 70–99)
GLUCOSE SERPL-MCNC: 295 MG/DL — HIGH (ref 70–99)
GLUCOSE SERPL-MCNC: 316 MG/DL — HIGH (ref 70–99)
GLUCOSE SERPL-MCNC: 339 MG/DL — HIGH (ref 70–99)
GLUCOSE SERPL-MCNC: 395 MG/DL — HIGH (ref 70–99)
GLUCOSE SERPL-MCNC: 97 MG/DL — SIGNIFICANT CHANGE UP (ref 70–99)
GLUCOSE UR QL: 1000 MG/DL
GLUCOSE UR QL: NEGATIVE — SIGNIFICANT CHANGE UP
HBA1C BLD-MCNC: 11.5 % — HIGH (ref 4–5.6)
HBA1C BLD-MCNC: 9.7 % — HIGH (ref 4–5.6)
HCO3 BLDA-SCNC: 31 MMOL/L — HIGH (ref 23–27)
HCO3 BLDV-SCNC: 25 MMOL/L — SIGNIFICANT CHANGE UP (ref 21–29)
HCT VFR BLD CALC: 38.1 % — SIGNIFICANT CHANGE UP (ref 34.5–45)
HCT VFR BLD CALC: 40.9 % — SIGNIFICANT CHANGE UP (ref 34.5–45)
HCT VFR BLD CALC: 41.1 % — SIGNIFICANT CHANGE UP (ref 34.5–45)
HCT VFR BLD CALC: 41.6 % — SIGNIFICANT CHANGE UP (ref 34.5–45)
HCT VFR BLD CALC: 41.9 % — SIGNIFICANT CHANGE UP (ref 34.5–45)
HCT VFR BLD CALC: 42 % — SIGNIFICANT CHANGE UP (ref 34.5–45)
HCT VFR BLD CALC: 42 % — SIGNIFICANT CHANGE UP (ref 34.5–45)
HCT VFR BLD CALC: 42.3 % — SIGNIFICANT CHANGE UP (ref 34.5–45)
HCT VFR BLD CALC: 42.5 % — SIGNIFICANT CHANGE UP (ref 34.5–45)
HCT VFR BLD CALC: 42.8 % — SIGNIFICANT CHANGE UP (ref 34.5–45)
HCT VFR BLD CALC: 43.4 % — SIGNIFICANT CHANGE UP (ref 34.5–45)
HCT VFR BLD CALC: 43.7 % — SIGNIFICANT CHANGE UP (ref 34.5–45)
HCT VFR BLD CALC: 44 % — SIGNIFICANT CHANGE UP (ref 34.5–45)
HCT VFR BLD CALC: 45.3 % — HIGH (ref 34.5–45)
HCT VFR BLD CALC: 45.4 % — HIGH (ref 34.5–45)
HCT VFR BLD CALC: 45.8 % — HIGH (ref 34.5–45)
HCT VFR BLD CALC: 46.2 % — HIGH (ref 34.5–45)
HCT VFR BLD CALC: 46.3 % — HIGH (ref 34.5–45)
HCT VFR BLD CALC: 46.6 % — HIGH (ref 34.5–45)
HCT VFR BLD CALC: 47.8 % — HIGH (ref 34.5–45)
HCT VFR BLD CALC: 47.9 % — HIGH (ref 34.5–45)
HCT VFR BLD CALC: 48.3 % — HIGH (ref 34.5–45)
HCT VFR BLD CALC: 48.9 % — HIGH (ref 34.5–45)
HDLC SERPL-MCNC: 42 MG/DL — LOW
HDLC SERPL-MCNC: 43 MG/DL — LOW
HDLC SERPL-MCNC: 54 MG/DL — SIGNIFICANT CHANGE UP
HDLC SERPL-MCNC: 60 MG/DL — SIGNIFICANT CHANGE UP
HGB BLD-MCNC: 12.1 G/DL — SIGNIFICANT CHANGE UP (ref 11.5–15.5)
HGB BLD-MCNC: 12.8 G/DL — SIGNIFICANT CHANGE UP (ref 11.5–15.5)
HGB BLD-MCNC: 13 G/DL — SIGNIFICANT CHANGE UP (ref 11.5–15.5)
HGB BLD-MCNC: 13.1 G/DL — SIGNIFICANT CHANGE UP (ref 11.5–15.5)
HGB BLD-MCNC: 13.2 G/DL — SIGNIFICANT CHANGE UP (ref 11.5–15.5)
HGB BLD-MCNC: 13.2 G/DL — SIGNIFICANT CHANGE UP (ref 11.5–15.5)
HGB BLD-MCNC: 13.3 G/DL — SIGNIFICANT CHANGE UP (ref 11.5–15.5)
HGB BLD-MCNC: 13.4 G/DL — SIGNIFICANT CHANGE UP (ref 11.5–15.5)
HGB BLD-MCNC: 13.5 G/DL — SIGNIFICANT CHANGE UP (ref 11.5–15.5)
HGB BLD-MCNC: 13.5 G/DL — SIGNIFICANT CHANGE UP (ref 11.5–15.5)
HGB BLD-MCNC: 13.7 G/DL — SIGNIFICANT CHANGE UP (ref 11.5–15.5)
HGB BLD-MCNC: 14 G/DL — SIGNIFICANT CHANGE UP (ref 11.5–15.5)
HGB BLD-MCNC: 14.1 G/DL — SIGNIFICANT CHANGE UP (ref 11.5–15.5)
HGB BLD-MCNC: 14.7 G/DL — SIGNIFICANT CHANGE UP (ref 11.5–15.5)
HGB BLD-MCNC: 14.7 G/DL — SIGNIFICANT CHANGE UP (ref 11.5–15.5)
HGB BLD-MCNC: 14.8 G/DL — SIGNIFICANT CHANGE UP (ref 11.5–15.5)
HGB BLD-MCNC: 14.8 G/DL — SIGNIFICANT CHANGE UP (ref 11.5–15.5)
HGB BLD-MCNC: 15 G/DL — SIGNIFICANT CHANGE UP (ref 11.5–15.5)
HGB BLD-MCNC: 15.2 G/DL — SIGNIFICANT CHANGE UP (ref 11.5–15.5)
HGB BLD-MCNC: 15.4 G/DL — SIGNIFICANT CHANGE UP (ref 11.5–15.5)
HGB BLD-MCNC: 15.7 G/DL — HIGH (ref 11.5–15.5)
HOROWITZ INDEX BLDA+IHG-RTO: 100 — SIGNIFICANT CHANGE UP
HOROWITZ INDEX BLDV+IHG-RTO: 21 — SIGNIFICANT CHANGE UP
IMM GRANULOCYTES NFR BLD AUTO: 0.2 % — SIGNIFICANT CHANGE UP (ref 0–1.5)
IMM GRANULOCYTES NFR BLD AUTO: 0.2 % — SIGNIFICANT CHANGE UP (ref 0–1.5)
IMM GRANULOCYTES NFR BLD AUTO: 0.3 % — SIGNIFICANT CHANGE UP (ref 0–1.5)
IMM GRANULOCYTES NFR BLD AUTO: 0.4 % — SIGNIFICANT CHANGE UP (ref 0–1.5)
IMM GRANULOCYTES NFR BLD AUTO: 0.5 % — SIGNIFICANT CHANGE UP (ref 0–1.5)
IMM GRANULOCYTES NFR BLD AUTO: 0.6 % — SIGNIFICANT CHANGE UP (ref 0–1.5)
INR BLD: 1.03 RATIO — SIGNIFICANT CHANGE UP (ref 0.88–1.16)
INR BLD: 1.08 RATIO — SIGNIFICANT CHANGE UP (ref 0.88–1.16)
INR BLD: 1.1 RATIO — SIGNIFICANT CHANGE UP (ref 0.88–1.16)
INR BLD: 1.67 RATIO — HIGH (ref 0.88–1.16)
INR BLD: 2.48 RATIO — HIGH (ref 0.88–1.16)
IRON SATN MFR SERPL: 12 % — LOW (ref 15–50)
IRON SATN MFR SERPL: 46 UG/DL — SIGNIFICANT CHANGE UP (ref 40–150)
KETONES UR-MCNC: ABNORMAL
KETONES UR-MCNC: NEGATIVE — SIGNIFICANT CHANGE UP
LACTATE SERPL-SCNC: 1.5 MMOL/L — SIGNIFICANT CHANGE UP (ref 0.7–2)
LACTATE SERPL-SCNC: 1.8 MMOL/L — SIGNIFICANT CHANGE UP (ref 0.7–2)
LACTATE SERPL-SCNC: 2.1 MMOL/L — HIGH (ref 0.7–2)
LACTATE SERPL-SCNC: 2.2 MMOL/L — HIGH (ref 0.7–2)
LACTATE SERPL-SCNC: 2.8 MMOL/L — HIGH (ref 0.7–2)
LACTATE SERPL-SCNC: 2.9 MMOL/L — HIGH (ref 0.7–2)
LDH SERPL L TO P-CCNC: 220 U/L — SIGNIFICANT CHANGE UP (ref 120–225)
LDH SERPL L TO P-CCNC: 222 U/L — SIGNIFICANT CHANGE UP (ref 120–225)
LEUKOCYTE ESTERASE UR-ACNC: ABNORMAL
LEUKOCYTE ESTERASE UR-ACNC: NEGATIVE — SIGNIFICANT CHANGE UP
LIDOCAIN IGE QN: 241 U/L — SIGNIFICANT CHANGE UP (ref 73–393)
LIDOCAIN IGE QN: 477 U/L — HIGH (ref 73–393)
LIPID PNL WITH DIRECT LDL SERPL: 182 MG/DL — SIGNIFICANT CHANGE UP
LIPID PNL WITH DIRECT LDL SERPL: 67 MG/DL — SIGNIFICANT CHANGE UP
LIPID PNL WITH DIRECT LDL SERPL: 82 MG/DL — SIGNIFICANT CHANGE UP
LIPID PNL WITH DIRECT LDL SERPL: 88 MG/DL — SIGNIFICANT CHANGE UP
LYMPHOCYTES # BLD AUTO: 0.52 K/UL — LOW (ref 1–3.3)
LYMPHOCYTES # BLD AUTO: 1.02 K/UL — SIGNIFICANT CHANGE UP (ref 1–3.3)
LYMPHOCYTES # BLD AUTO: 1.42 K/UL — SIGNIFICANT CHANGE UP (ref 1–3.3)
LYMPHOCYTES # BLD AUTO: 27.9 % — SIGNIFICANT CHANGE UP (ref 13–44)
LYMPHOCYTES # BLD AUTO: 3.28 K/UL — SIGNIFICANT CHANGE UP (ref 1–3.3)
LYMPHOCYTES # BLD AUTO: 3.29 K/UL — SIGNIFICANT CHANGE UP (ref 1–3.3)
LYMPHOCYTES # BLD AUTO: 3.44 K/UL — HIGH (ref 1–3.3)
LYMPHOCYTES # BLD AUTO: 3.44 K/UL — HIGH (ref 1–3.3)
LYMPHOCYTES # BLD AUTO: 3.77 K/UL — HIGH (ref 1–3.3)
LYMPHOCYTES # BLD AUTO: 31 % — SIGNIFICANT CHANGE UP (ref 13–44)
LYMPHOCYTES # BLD AUTO: 33.1 % — SIGNIFICANT CHANGE UP (ref 13–44)
LYMPHOCYTES # BLD AUTO: 34.5 % — SIGNIFICANT CHANGE UP (ref 13–44)
LYMPHOCYTES # BLD AUTO: 37.1 % — SIGNIFICANT CHANGE UP (ref 13–44)
LYMPHOCYTES # BLD AUTO: 7.1 % — LOW (ref 13–44)
LYMPHOCYTES # BLD AUTO: 8.4 % — LOW (ref 13–44)
LYMPHOCYTES # BLD AUTO: 8.8 % — LOW (ref 13–44)
MAGNESIUM SERPL-MCNC: 1.7 MG/DL — SIGNIFICANT CHANGE UP (ref 1.6–2.6)
MAGNESIUM SERPL-MCNC: 1.9 MG/DL — SIGNIFICANT CHANGE UP (ref 1.6–2.6)
MAGNESIUM SERPL-MCNC: 1.9 MG/DL — SIGNIFICANT CHANGE UP (ref 1.6–2.6)
MAGNESIUM SERPL-MCNC: 2 MG/DL — SIGNIFICANT CHANGE UP (ref 1.6–2.6)
MAGNESIUM SERPL-MCNC: 2.1 MG/DL — SIGNIFICANT CHANGE UP (ref 1.6–2.6)
MAGNESIUM SERPL-MCNC: 2.3 MG/DL — SIGNIFICANT CHANGE UP (ref 1.6–2.6)
MAGNESIUM SERPL-MCNC: 2.3 MG/DL — SIGNIFICANT CHANGE UP (ref 1.6–2.6)
MANUAL SMEAR VERIFICATION: SIGNIFICANT CHANGE UP
MCHC RBC-ENTMCNC: 29 PG — SIGNIFICANT CHANGE UP (ref 27–34)
MCHC RBC-ENTMCNC: 29 PG — SIGNIFICANT CHANGE UP (ref 27–34)
MCHC RBC-ENTMCNC: 29.1 PG — SIGNIFICANT CHANGE UP (ref 27–34)
MCHC RBC-ENTMCNC: 29.2 PG — SIGNIFICANT CHANGE UP (ref 27–34)
MCHC RBC-ENTMCNC: 29.3 PG — SIGNIFICANT CHANGE UP (ref 27–34)
MCHC RBC-ENTMCNC: 29.4 PG — SIGNIFICANT CHANGE UP (ref 27–34)
MCHC RBC-ENTMCNC: 29.5 PG — SIGNIFICANT CHANGE UP (ref 27–34)
MCHC RBC-ENTMCNC: 29.6 PG — SIGNIFICANT CHANGE UP (ref 27–34)
MCHC RBC-ENTMCNC: 29.7 PG — SIGNIFICANT CHANGE UP (ref 27–34)
MCHC RBC-ENTMCNC: 29.8 PG — SIGNIFICANT CHANGE UP (ref 27–34)
MCHC RBC-ENTMCNC: 29.9 PG — SIGNIFICANT CHANGE UP (ref 27–34)
MCHC RBC-ENTMCNC: 30 PG — SIGNIFICANT CHANGE UP (ref 27–34)
MCHC RBC-ENTMCNC: 30.1 PG — SIGNIFICANT CHANGE UP (ref 27–34)
MCHC RBC-ENTMCNC: 30.3 PG — SIGNIFICANT CHANGE UP (ref 27–34)
MCHC RBC-ENTMCNC: 31.1 GM/DL — LOW (ref 32–36)
MCHC RBC-ENTMCNC: 31.2 GM/DL — LOW (ref 32–36)
MCHC RBC-ENTMCNC: 31.3 GM/DL — LOW (ref 32–36)
MCHC RBC-ENTMCNC: 31.3 GM/DL — LOW (ref 32–36)
MCHC RBC-ENTMCNC: 31.4 GM/DL — LOW (ref 32–36)
MCHC RBC-ENTMCNC: 31.5 GM/DL — LOW (ref 32–36)
MCHC RBC-ENTMCNC: 31.5 GM/DL — LOW (ref 32–36)
MCHC RBC-ENTMCNC: 31.6 GM/DL — LOW (ref 32–36)
MCHC RBC-ENTMCNC: 31.7 GM/DL — LOW (ref 32–36)
MCHC RBC-ENTMCNC: 31.8 GM/DL — LOW (ref 32–36)
MCHC RBC-ENTMCNC: 31.9 GM/DL — LOW (ref 32–36)
MCHC RBC-ENTMCNC: 32 GM/DL — SIGNIFICANT CHANGE UP (ref 32–36)
MCHC RBC-ENTMCNC: 32.1 GM/DL — SIGNIFICANT CHANGE UP (ref 32–36)
MCHC RBC-ENTMCNC: 32.1 GM/DL — SIGNIFICANT CHANGE UP (ref 32–36)
MCHC RBC-ENTMCNC: 32.2 GM/DL — SIGNIFICANT CHANGE UP (ref 32–36)
MCHC RBC-ENTMCNC: 32.3 GM/DL — SIGNIFICANT CHANGE UP (ref 32–36)
MCHC RBC-ENTMCNC: 32.5 GM/DL — SIGNIFICANT CHANGE UP (ref 32–36)
MCHC RBC-ENTMCNC: 33 GM/DL — SIGNIFICANT CHANGE UP (ref 32–36)
MCHC RBC-ENTMCNC: 33 GM/DL — SIGNIFICANT CHANGE UP (ref 32–36)
MCV RBC AUTO: 89.4 FL — SIGNIFICANT CHANGE UP (ref 80–100)
MCV RBC AUTO: 89.4 FL — SIGNIFICANT CHANGE UP (ref 80–100)
MCV RBC AUTO: 89.9 FL — SIGNIFICANT CHANGE UP (ref 80–100)
MCV RBC AUTO: 90.5 FL — SIGNIFICANT CHANGE UP (ref 80–100)
MCV RBC AUTO: 90.9 FL — SIGNIFICANT CHANGE UP (ref 80–100)
MCV RBC AUTO: 91.4 FL — SIGNIFICANT CHANGE UP (ref 80–100)
MCV RBC AUTO: 91.4 FL — SIGNIFICANT CHANGE UP (ref 80–100)
MCV RBC AUTO: 91.9 FL — SIGNIFICANT CHANGE UP (ref 80–100)
MCV RBC AUTO: 92.2 FL — SIGNIFICANT CHANGE UP (ref 80–100)
MCV RBC AUTO: 93.3 FL — SIGNIFICANT CHANGE UP (ref 80–100)
MCV RBC AUTO: 93.3 FL — SIGNIFICANT CHANGE UP (ref 80–100)
MCV RBC AUTO: 93.6 FL — SIGNIFICANT CHANGE UP (ref 80–100)
MCV RBC AUTO: 93.8 FL — SIGNIFICANT CHANGE UP (ref 80–100)
MCV RBC AUTO: 93.8 FL — SIGNIFICANT CHANGE UP (ref 80–100)
MCV RBC AUTO: 94.1 FL — SIGNIFICANT CHANGE UP (ref 80–100)
MCV RBC AUTO: 94.4 FL — SIGNIFICANT CHANGE UP (ref 80–100)
MCV RBC AUTO: 94.4 FL — SIGNIFICANT CHANGE UP (ref 80–100)
MCV RBC AUTO: 94.7 FL — SIGNIFICANT CHANGE UP (ref 80–100)
MCV RBC AUTO: 94.8 FL — SIGNIFICANT CHANGE UP (ref 80–100)
MCV RBC AUTO: 94.8 FL — SIGNIFICANT CHANGE UP (ref 80–100)
MCV RBC AUTO: 95.1 FL — SIGNIFICANT CHANGE UP (ref 80–100)
MCV RBC AUTO: 95.6 FL — SIGNIFICANT CHANGE UP (ref 80–100)
MCV RBC AUTO: 96.2 FL — SIGNIFICANT CHANGE UP (ref 80–100)
METHOD TYPE: SIGNIFICANT CHANGE UP
METHOD TYPE: SIGNIFICANT CHANGE UP
MONOCYTES # BLD AUTO: 0.06 K/UL — SIGNIFICANT CHANGE UP (ref 0–0.9)
MONOCYTES # BLD AUTO: 0.67 K/UL — SIGNIFICANT CHANGE UP (ref 0–0.9)
MONOCYTES # BLD AUTO: 0.76 K/UL — SIGNIFICANT CHANGE UP (ref 0–0.9)
MONOCYTES # BLD AUTO: 0.82 K/UL — SIGNIFICANT CHANGE UP (ref 0–0.9)
MONOCYTES # BLD AUTO: 0.89 K/UL — SIGNIFICANT CHANGE UP (ref 0–0.9)
MONOCYTES # BLD AUTO: 0.9 K/UL — SIGNIFICANT CHANGE UP (ref 0–0.9)
MONOCYTES # BLD AUTO: 0.96 K/UL — HIGH (ref 0–0.9)
MONOCYTES # BLD AUTO: 1.28 K/UL — HIGH (ref 0–0.9)
MONOCYTES NFR BLD AUTO: 0.8 % — LOW (ref 2–14)
MONOCYTES NFR BLD AUTO: 4.2 % — SIGNIFICANT CHANGE UP (ref 2–14)
MONOCYTES NFR BLD AUTO: 7.6 % — SIGNIFICANT CHANGE UP (ref 2–14)
MONOCYTES NFR BLD AUTO: 7.9 % — SIGNIFICANT CHANGE UP (ref 2–14)
MONOCYTES NFR BLD AUTO: 8 % — SIGNIFICANT CHANGE UP (ref 2–14)
MONOCYTES NFR BLD AUTO: 9 % — SIGNIFICANT CHANGE UP (ref 2–14)
MONOCYTES NFR BLD AUTO: 9.3 % — SIGNIFICANT CHANGE UP (ref 2–14)
MONOCYTES NFR BLD AUTO: 9.5 % — SIGNIFICANT CHANGE UP (ref 2–14)
MRSA PCR RESULT.: SIGNIFICANT CHANGE UP
NEUTROPHILS # BLD AUTO: 10.09 K/UL — HIGH (ref 1.8–7.4)
NEUTROPHILS # BLD AUTO: 13.9 K/UL — HIGH (ref 1.8–7.4)
NEUTROPHILS # BLD AUTO: 4.69 K/UL — SIGNIFICANT CHANGE UP (ref 1.8–7.4)
NEUTROPHILS # BLD AUTO: 5.58 K/UL — SIGNIFICANT CHANGE UP (ref 1.8–7.4)
NEUTROPHILS # BLD AUTO: 5.83 K/UL — SIGNIFICANT CHANGE UP (ref 1.8–7.4)
NEUTROPHILS # BLD AUTO: 6.65 K/UL — SIGNIFICANT CHANGE UP (ref 1.8–7.4)
NEUTROPHILS # BLD AUTO: 6.69 K/UL — SIGNIFICANT CHANGE UP (ref 1.8–7.4)
NEUTROPHILS # BLD AUTO: 8.33 K/UL — HIGH (ref 1.8–7.4)
NEUTROPHILS NFR BLD AUTO: 53 % — SIGNIFICANT CHANGE UP (ref 43–77)
NEUTROPHILS NFR BLD AUTO: 55.9 % — SIGNIFICANT CHANGE UP (ref 43–77)
NEUTROPHILS NFR BLD AUTO: 58.7 % — SIGNIFICANT CHANGE UP (ref 43–77)
NEUTROPHILS NFR BLD AUTO: 60.2 % — SIGNIFICANT CHANGE UP (ref 43–77)
NEUTROPHILS NFR BLD AUTO: 61.8 % — SIGNIFICANT CHANGE UP (ref 43–77)
NEUTROPHILS NFR BLD AUTO: 83 % — HIGH (ref 43–77)
NEUTROPHILS NFR BLD AUTO: 86.1 % — HIGH (ref 43–77)
NEUTROPHILS NFR BLD AUTO: 91.5 % — HIGH (ref 43–77)
NITRITE UR-MCNC: NEGATIVE — SIGNIFICANT CHANGE UP
NON HDL CHOLESTEROL: 106 MG/DL — SIGNIFICANT CHANGE UP
NON HDL CHOLESTEROL: 87 MG/DL — SIGNIFICANT CHANGE UP
NRBC # BLD: 0 /100 WBCS — SIGNIFICANT CHANGE UP (ref 0–0)
NT-PROBNP SERPL-SCNC: 2012 PG/ML — HIGH (ref 0–450)
NT-PROBNP SERPL-SCNC: 2050 PG/ML — HIGH (ref 0–450)
NT-PROBNP SERPL-SCNC: 2164 PG/ML — HIGH (ref 0–450)
NT-PROBNP SERPL-SCNC: 324 PG/ML — SIGNIFICANT CHANGE UP (ref 0–450)
ORGANISM # SPEC MICROSCOPIC CNT: SIGNIFICANT CHANGE UP
PCO2 BLDA: 56 MMHG — HIGH (ref 32–46)
PCO2 BLDV: 45 MMHG — SIGNIFICANT CHANGE UP (ref 35–50)
PH BLDA: 7.37 — SIGNIFICANT CHANGE UP (ref 7.35–7.45)
PH BLDV: 7.36 — SIGNIFICANT CHANGE UP (ref 7.35–7.45)
PH UR: 5 — SIGNIFICANT CHANGE UP (ref 5–8)
PH UR: 7 — SIGNIFICANT CHANGE UP (ref 5–8)
PHOSPHATE SERPL-MCNC: 2.1 MG/DL — LOW (ref 2.5–4.5)
PHOSPHATE SERPL-MCNC: 2.3 MG/DL — LOW (ref 2.5–4.5)
PHOSPHATE SERPL-MCNC: 2.7 MG/DL — SIGNIFICANT CHANGE UP (ref 2.5–4.5)
PHOSPHATE SERPL-MCNC: 2.7 MG/DL — SIGNIFICANT CHANGE UP (ref 2.5–4.5)
PHOSPHATE SERPL-MCNC: 3.2 MG/DL — SIGNIFICANT CHANGE UP (ref 2.5–4.5)
PHOSPHATE SERPL-MCNC: 3.3 MG/DL — SIGNIFICANT CHANGE UP (ref 2.5–4.5)
PHOSPHATE SERPL-MCNC: 3.3 MG/DL — SIGNIFICANT CHANGE UP (ref 2.5–4.5)
PHOSPHATE SERPL-MCNC: 3.4 MG/DL — SIGNIFICANT CHANGE UP (ref 2.5–4.5)
PHOSPHATE SERPL-MCNC: 3.4 MG/DL — SIGNIFICANT CHANGE UP (ref 2.5–4.5)
PHOSPHATE SERPL-MCNC: 3.5 MG/DL — SIGNIFICANT CHANGE UP (ref 2.5–4.5)
PLAT MORPH BLD: NORMAL — SIGNIFICANT CHANGE UP
PLATELET # BLD AUTO: 197 K/UL — SIGNIFICANT CHANGE UP (ref 150–400)
PLATELET # BLD AUTO: 224 K/UL — SIGNIFICANT CHANGE UP (ref 150–400)
PLATELET # BLD AUTO: 226 K/UL — SIGNIFICANT CHANGE UP (ref 150–400)
PLATELET # BLD AUTO: 232 K/UL — SIGNIFICANT CHANGE UP (ref 150–400)
PLATELET # BLD AUTO: 237 K/UL — SIGNIFICANT CHANGE UP (ref 150–400)
PLATELET # BLD AUTO: 238 K/UL — SIGNIFICANT CHANGE UP (ref 150–400)
PLATELET # BLD AUTO: 241 K/UL — SIGNIFICANT CHANGE UP (ref 150–400)
PLATELET # BLD AUTO: 242 K/UL — SIGNIFICANT CHANGE UP (ref 150–400)
PLATELET # BLD AUTO: 250 K/UL — SIGNIFICANT CHANGE UP (ref 150–400)
PLATELET # BLD AUTO: 250 K/UL — SIGNIFICANT CHANGE UP (ref 150–400)
PLATELET # BLD AUTO: 252 K/UL — SIGNIFICANT CHANGE UP (ref 150–400)
PLATELET # BLD AUTO: 254 K/UL — SIGNIFICANT CHANGE UP (ref 150–400)
PLATELET # BLD AUTO: 258 K/UL — SIGNIFICANT CHANGE UP (ref 150–400)
PLATELET # BLD AUTO: 260 K/UL — SIGNIFICANT CHANGE UP (ref 150–400)
PLATELET # BLD AUTO: 262 K/UL — SIGNIFICANT CHANGE UP (ref 150–400)
PLATELET # BLD AUTO: 265 K/UL — SIGNIFICANT CHANGE UP (ref 150–400)
PLATELET # BLD AUTO: 270 K/UL — SIGNIFICANT CHANGE UP (ref 150–400)
PLATELET # BLD AUTO: 283 K/UL — SIGNIFICANT CHANGE UP (ref 150–400)
PLATELET # BLD AUTO: 292 K/UL — SIGNIFICANT CHANGE UP (ref 150–400)
PLATELET # BLD AUTO: 295 K/UL — SIGNIFICANT CHANGE UP (ref 150–400)
PLATELET # BLD AUTO: 296 K/UL — SIGNIFICANT CHANGE UP (ref 150–400)
PLATELET # BLD AUTO: 309 K/UL — SIGNIFICANT CHANGE UP (ref 150–400)
PLATELET # BLD AUTO: 349 K/UL — SIGNIFICANT CHANGE UP (ref 150–400)
PLATELET COUNT - ESTIMATE: NORMAL — SIGNIFICANT CHANGE UP
PO2 BLDA: 77 MMHG — SIGNIFICANT CHANGE UP (ref 74–108)
PO2 BLDV: 52 MMHG — HIGH (ref 25–45)
POTASSIUM SERPL-MCNC: 2.7 MMOL/L — CRITICAL LOW (ref 3.5–5.3)
POTASSIUM SERPL-MCNC: 3.1 MMOL/L — LOW (ref 3.5–5.3)
POTASSIUM SERPL-MCNC: 3.1 MMOL/L — LOW (ref 3.5–5.3)
POTASSIUM SERPL-MCNC: 3.3 MMOL/L — LOW (ref 3.5–5.3)
POTASSIUM SERPL-MCNC: 3.3 MMOL/L — LOW (ref 3.5–5.3)
POTASSIUM SERPL-MCNC: 3.4 MMOL/L — LOW (ref 3.5–5.3)
POTASSIUM SERPL-MCNC: 3.6 MMOL/L — SIGNIFICANT CHANGE UP (ref 3.5–5.3)
POTASSIUM SERPL-MCNC: 3.7 MMOL/L — SIGNIFICANT CHANGE UP (ref 3.5–5.3)
POTASSIUM SERPL-MCNC: 3.8 MMOL/L — SIGNIFICANT CHANGE UP (ref 3.5–5.3)
POTASSIUM SERPL-MCNC: 3.8 MMOL/L — SIGNIFICANT CHANGE UP (ref 3.5–5.3)
POTASSIUM SERPL-MCNC: 3.9 MMOL/L — SIGNIFICANT CHANGE UP (ref 3.5–5.3)
POTASSIUM SERPL-MCNC: 4 MMOL/L — SIGNIFICANT CHANGE UP (ref 3.5–5.3)
POTASSIUM SERPL-MCNC: 4.1 MMOL/L — SIGNIFICANT CHANGE UP (ref 3.5–5.3)
POTASSIUM SERPL-MCNC: 4.5 MMOL/L — SIGNIFICANT CHANGE UP (ref 3.5–5.3)
POTASSIUM SERPL-MCNC: 4.6 MMOL/L — SIGNIFICANT CHANGE UP (ref 3.5–5.3)
POTASSIUM SERPL-MCNC: 4.6 MMOL/L — SIGNIFICANT CHANGE UP (ref 3.5–5.3)
POTASSIUM SERPL-MCNC: 4.7 MMOL/L — SIGNIFICANT CHANGE UP (ref 3.5–5.3)
POTASSIUM SERPL-SCNC: 2.7 MMOL/L — CRITICAL LOW (ref 3.5–5.3)
POTASSIUM SERPL-SCNC: 3.1 MMOL/L — LOW (ref 3.5–5.3)
POTASSIUM SERPL-SCNC: 3.1 MMOL/L — LOW (ref 3.5–5.3)
POTASSIUM SERPL-SCNC: 3.3 MMOL/L — LOW (ref 3.5–5.3)
POTASSIUM SERPL-SCNC: 3.3 MMOL/L — LOW (ref 3.5–5.3)
POTASSIUM SERPL-SCNC: 3.4 MMOL/L — LOW (ref 3.5–5.3)
POTASSIUM SERPL-SCNC: 3.6 MMOL/L
POTASSIUM SERPL-SCNC: 3.6 MMOL/L — SIGNIFICANT CHANGE UP (ref 3.5–5.3)
POTASSIUM SERPL-SCNC: 3.7 MMOL/L — SIGNIFICANT CHANGE UP (ref 3.5–5.3)
POTASSIUM SERPL-SCNC: 3.8 MMOL/L — SIGNIFICANT CHANGE UP (ref 3.5–5.3)
POTASSIUM SERPL-SCNC: 3.8 MMOL/L — SIGNIFICANT CHANGE UP (ref 3.5–5.3)
POTASSIUM SERPL-SCNC: 3.9 MMOL/L — SIGNIFICANT CHANGE UP (ref 3.5–5.3)
POTASSIUM SERPL-SCNC: 4 MMOL/L — SIGNIFICANT CHANGE UP (ref 3.5–5.3)
POTASSIUM SERPL-SCNC: 4.1 MMOL/L — SIGNIFICANT CHANGE UP (ref 3.5–5.3)
POTASSIUM SERPL-SCNC: 4.5 MMOL/L — SIGNIFICANT CHANGE UP (ref 3.5–5.3)
POTASSIUM SERPL-SCNC: 4.6 MMOL/L — SIGNIFICANT CHANGE UP (ref 3.5–5.3)
POTASSIUM SERPL-SCNC: 4.6 MMOL/L — SIGNIFICANT CHANGE UP (ref 3.5–5.3)
POTASSIUM SERPL-SCNC: 4.7 MMOL/L — SIGNIFICANT CHANGE UP (ref 3.5–5.3)
PROCALCITONIN SERPL-MCNC: 0.08 NG/ML — SIGNIFICANT CHANGE UP (ref 0.02–0.1)
PROT SERPL-MCNC: 6.5 G/DL — SIGNIFICANT CHANGE UP (ref 6–8.3)
PROT SERPL-MCNC: 6.7 G/DL — SIGNIFICANT CHANGE UP (ref 6–8.3)
PROT SERPL-MCNC: 6.8 G/DL — SIGNIFICANT CHANGE UP (ref 6–8.3)
PROT SERPL-MCNC: 7 G/DL — SIGNIFICANT CHANGE UP (ref 6–8.3)
PROT SERPL-MCNC: 7.1 G/DL — SIGNIFICANT CHANGE UP (ref 6–8.3)
PROT SERPL-MCNC: 7.2 G/DL — SIGNIFICANT CHANGE UP (ref 6–8.3)
PROT SERPL-MCNC: 7.2 G/DL — SIGNIFICANT CHANGE UP (ref 6–8.3)
PROT SERPL-MCNC: 7.5 G/DL — SIGNIFICANT CHANGE UP (ref 6–8.3)
PROT SERPL-MCNC: 7.6 G/DL — SIGNIFICANT CHANGE UP (ref 6–8.3)
PROT SERPL-MCNC: 7.6 G/DL — SIGNIFICANT CHANGE UP (ref 6–8.3)
PROT SERPL-MCNC: 7.7 G/DL — SIGNIFICANT CHANGE UP (ref 6–8.3)
PROT SERPL-MCNC: 7.8 G/DL — SIGNIFICANT CHANGE UP (ref 6–8.3)
PROT SERPL-MCNC: 8 G/DL — SIGNIFICANT CHANGE UP (ref 6–8.3)
PROT UR-MCNC: 100
PROT UR-MCNC: 15
PROT UR-MCNC: NEGATIVE — SIGNIFICANT CHANGE UP
PROT UR-MCNC: NEGATIVE — SIGNIFICANT CHANGE UP
PROTHROM AB SERPL-ACNC: 11.4 SEC — SIGNIFICANT CHANGE UP (ref 10–12.9)
PROTHROM AB SERPL-ACNC: 12.3 SEC — SIGNIFICANT CHANGE UP (ref 10–12.9)
PROTHROM AB SERPL-ACNC: 12.8 SEC — SIGNIFICANT CHANGE UP (ref 10.6–13.6)
PROTHROM AB SERPL-ACNC: 19.4 SEC — HIGH (ref 10.6–13.6)
PROTHROM AB SERPL-ACNC: 27.8 SEC — HIGH (ref 10.6–13.6)
RAPID RVP RESULT: SIGNIFICANT CHANGE UP
RBC # BLD: 4.07 M/UL — SIGNIFICANT CHANGE UP (ref 3.8–5.2)
RBC # BLD: 4.3 M/UL — SIGNIFICANT CHANGE UP (ref 3.8–5.2)
RBC # BLD: 4.38 M/UL — SIGNIFICANT CHANGE UP (ref 3.8–5.2)
RBC # BLD: 4.42 M/UL — SIGNIFICANT CHANGE UP (ref 3.8–5.2)
RBC # BLD: 4.43 M/UL — SIGNIFICANT CHANGE UP (ref 3.8–5.2)
RBC # BLD: 4.44 M/UL — SIGNIFICANT CHANGE UP (ref 3.8–5.2)
RBC # BLD: 4.45 M/UL — SIGNIFICANT CHANGE UP (ref 3.8–5.2)
RBC # BLD: 4.52 M/UL — SIGNIFICANT CHANGE UP (ref 3.8–5.2)
RBC # BLD: 4.53 M/UL — SIGNIFICANT CHANGE UP (ref 3.8–5.2)
RBC # BLD: 4.57 M/UL — SIGNIFICANT CHANGE UP (ref 3.8–5.2)
RBC # BLD: 4.59 M/UL — SIGNIFICANT CHANGE UP (ref 3.8–5.2)
RBC # BLD: 4.65 M/UL — SIGNIFICANT CHANGE UP (ref 3.8–5.2)
RBC # BLD: 4.86 M/UL — SIGNIFICANT CHANGE UP (ref 3.8–5.2)
RBC # BLD: 4.91 M/UL — SIGNIFICANT CHANGE UP (ref 3.8–5.2)
RBC # BLD: 5.02 M/UL — SIGNIFICANT CHANGE UP (ref 3.8–5.2)
RBC # BLD: 5.04 M/UL — SIGNIFICANT CHANGE UP (ref 3.8–5.2)
RBC # BLD: 5.08 M/UL — SIGNIFICANT CHANGE UP (ref 3.8–5.2)
RBC # BLD: 5.08 M/UL — SIGNIFICANT CHANGE UP (ref 3.8–5.2)
RBC # BLD: 5.21 M/UL — HIGH (ref 3.8–5.2)
RBC # BLD: 5.24 M/UL — HIGH (ref 3.8–5.2)
RBC # BLD: 5.35 M/UL — HIGH (ref 3.8–5.2)
RBC # FLD: 12.3 % — SIGNIFICANT CHANGE UP (ref 10.3–14.5)
RBC # FLD: 12.5 % — SIGNIFICANT CHANGE UP (ref 10.3–14.5)
RBC # FLD: 12.6 % — SIGNIFICANT CHANGE UP (ref 10.3–14.5)
RBC # FLD: 12.8 % — SIGNIFICANT CHANGE UP (ref 10.3–14.5)
RBC # FLD: 12.9 % — SIGNIFICANT CHANGE UP (ref 10.3–14.5)
RBC # FLD: 13 % — SIGNIFICANT CHANGE UP (ref 10.3–14.5)
RBC # FLD: 13.1 % — SIGNIFICANT CHANGE UP (ref 10.3–14.5)
RBC # FLD: 13.2 % — SIGNIFICANT CHANGE UP (ref 10.3–14.5)
RBC # FLD: 14.4 % — SIGNIFICANT CHANGE UP (ref 10.3–14.5)
RBC BLD AUTO: NORMAL — SIGNIFICANT CHANGE UP
RBC CASTS # UR COMP ASSIST: ABNORMAL /HPF (ref 0–2)
RSV RESULT: SIGNIFICANT CHANGE UP
RSV RNA RESP QL NAA+PROBE: SIGNIFICANT CHANGE UP
S AUREUS DNA NOSE QL NAA+PROBE: SIGNIFICANT CHANGE UP
SAO2 % BLDA: 95 % — SIGNIFICANT CHANGE UP (ref 92–96)
SAO2 % BLDV: 87 % — SIGNIFICANT CHANGE UP (ref 67–88)
SARS-COV-2 IGG SERPL QL IA: NEGATIVE — SIGNIFICANT CHANGE UP
SARS-COV-2 IGG SERPL QL IA: NEGATIVE — SIGNIFICANT CHANGE UP
SARS-COV-2 IGM SERPL IA-ACNC: <0.1 INDEX — SIGNIFICANT CHANGE UP
SARS-COV-2 IGM SERPL IA-ACNC: <0.1 INDEX — SIGNIFICANT CHANGE UP
SARS-COV-2 RNA SPEC QL NAA+PROBE: DETECTED
SARS-COV-2 RNA SPEC QL NAA+PROBE: SIGNIFICANT CHANGE UP
SODIUM SERPL-SCNC: 135 MMOL/L — SIGNIFICANT CHANGE UP (ref 135–145)
SODIUM SERPL-SCNC: 136 MMOL/L — SIGNIFICANT CHANGE UP (ref 135–145)
SODIUM SERPL-SCNC: 136 MMOL/L — SIGNIFICANT CHANGE UP (ref 135–145)
SODIUM SERPL-SCNC: 137 MMOL/L — SIGNIFICANT CHANGE UP (ref 135–145)
SODIUM SERPL-SCNC: 138 MMOL/L — SIGNIFICANT CHANGE UP (ref 135–145)
SODIUM SERPL-SCNC: 139 MMOL/L — SIGNIFICANT CHANGE UP (ref 135–145)
SODIUM SERPL-SCNC: 140 MMOL/L — SIGNIFICANT CHANGE UP (ref 135–145)
SODIUM SERPL-SCNC: 140 MMOL/L — SIGNIFICANT CHANGE UP (ref 135–145)
SODIUM SERPL-SCNC: 141 MMOL/L — SIGNIFICANT CHANGE UP (ref 135–145)
SODIUM SERPL-SCNC: 142 MMOL/L — SIGNIFICANT CHANGE UP (ref 135–145)
SODIUM SERPL-SCNC: 143 MMOL/L
SODIUM SERPL-SCNC: 143 MMOL/L — SIGNIFICANT CHANGE UP (ref 135–145)
SODIUM SERPL-SCNC: 145 MMOL/L — SIGNIFICANT CHANGE UP (ref 135–145)
SP GR SPEC: 1.01 — SIGNIFICANT CHANGE UP (ref 1.01–1.02)
SP GR SPEC: 1.02 — SIGNIFICANT CHANGE UP (ref 1.01–1.02)
SPECIMEN SOURCE: SIGNIFICANT CHANGE UP
T PALLIDUM AB TITR SER: NEGATIVE — SIGNIFICANT CHANGE UP
TIBC SERPL-MCNC: 377 UG/DL — SIGNIFICANT CHANGE UP (ref 250–450)
TOTAL CHOLESTEROL/HDL RATIO MEASUREMENT: 3.7 RATIO — SIGNIFICANT CHANGE UP (ref 3.3–7.1)
TOTAL CHOLESTEROL/HDL RATIO MEASUREMENT: 5 RATIO — SIGNIFICANT CHANGE UP (ref 3.3–7.1)
TRIGL SERPL-MCNC: 100 MG/DL — SIGNIFICANT CHANGE UP
TRIGL SERPL-MCNC: 149 MG/DL — SIGNIFICANT CHANGE UP (ref 10–149)
TRIGL SERPL-MCNC: 163 MG/DL — HIGH (ref 10–149)
TRIGL SERPL-MCNC: 89 MG/DL — SIGNIFICANT CHANGE UP
TROPONIN I SERPL-MCNC: 0.04 NG/ML — SIGNIFICANT CHANGE UP (ref 0–0.04)
TROPONIN I SERPL-MCNC: <0.015 NG/ML — SIGNIFICANT CHANGE UP (ref 0–0.04)
TSH SERPL-MCNC: 0.49 UU/ML — SIGNIFICANT CHANGE UP (ref 0.34–4.82)
TSH SERPL-MCNC: 0.64 UU/ML — SIGNIFICANT CHANGE UP (ref 0.34–4.82)
TSH SERPL-MCNC: 0.75 UU/ML — SIGNIFICANT CHANGE UP (ref 0.34–4.82)
TSH SERPL-MCNC: 1.58 UU/ML — SIGNIFICANT CHANGE UP (ref 0.34–4.82)
TSH SERPL-MCNC: 2.01 UU/ML — SIGNIFICANT CHANGE UP (ref 0.34–4.82)
UIBC SERPL-MCNC: 331 UG/DL — SIGNIFICANT CHANGE UP (ref 110–370)
UROBILINOGEN FLD QL: NEGATIVE — SIGNIFICANT CHANGE UP
VIT B12 SERPL-MCNC: 376 PG/ML — SIGNIFICANT CHANGE UP (ref 232–1245)
VIT B12 SERPL-MCNC: 386 PG/ML — SIGNIFICANT CHANGE UP (ref 232–1245)
VIT B12 SERPL-MCNC: 388 PG/ML — SIGNIFICANT CHANGE UP (ref 232–1245)
VIT B12 SERPL-MCNC: 389 PG/ML — SIGNIFICANT CHANGE UP (ref 232–1245)
VIT B12 SERPL-MCNC: 407 PG/ML — SIGNIFICANT CHANGE UP (ref 232–1245)
WBC # BLD: 10.11 K/UL — SIGNIFICANT CHANGE UP (ref 3.8–10.5)
WBC # BLD: 10.3 K/UL — SIGNIFICANT CHANGE UP (ref 3.8–10.5)
WBC # BLD: 10.5 K/UL — SIGNIFICANT CHANGE UP (ref 3.8–10.5)
WBC # BLD: 10.74 K/UL — HIGH (ref 3.8–10.5)
WBC # BLD: 10.95 K/UL — HIGH (ref 3.8–10.5)
WBC # BLD: 11.1 K/UL — HIGH (ref 3.8–10.5)
WBC # BLD: 11.17 K/UL — HIGH (ref 3.8–10.5)
WBC # BLD: 11.54 K/UL — HIGH (ref 3.8–10.5)
WBC # BLD: 12.15 K/UL — HIGH (ref 3.8–10.5)
WBC # BLD: 12.41 K/UL — HIGH (ref 3.8–10.5)
WBC # BLD: 12.7 K/UL — HIGH (ref 3.8–10.5)
WBC # BLD: 13.49 K/UL — HIGH (ref 3.8–10.5)
WBC # BLD: 13.83 K/UL — HIGH (ref 3.8–10.5)
WBC # BLD: 15.25 K/UL — HIGH (ref 3.8–10.5)
WBC # BLD: 15.43 K/UL — HIGH (ref 3.8–10.5)
WBC # BLD: 16.13 K/UL — HIGH (ref 3.8–10.5)
WBC # BLD: 7.28 K/UL — SIGNIFICANT CHANGE UP (ref 3.8–10.5)
WBC # BLD: 8.85 K/UL — SIGNIFICANT CHANGE UP (ref 3.8–10.5)
WBC # BLD: 9.66 K/UL — SIGNIFICANT CHANGE UP (ref 3.8–10.5)
WBC # BLD: 9.84 K/UL — SIGNIFICANT CHANGE UP (ref 3.8–10.5)
WBC # BLD: 9.94 K/UL — SIGNIFICANT CHANGE UP (ref 3.8–10.5)
WBC # BLD: 9.97 K/UL — SIGNIFICANT CHANGE UP (ref 3.8–10.5)
WBC # BLD: 9.98 K/UL — SIGNIFICANT CHANGE UP (ref 3.8–10.5)
WBC # FLD AUTO: 10.11 K/UL — SIGNIFICANT CHANGE UP (ref 3.8–10.5)
WBC # FLD AUTO: 10.3 K/UL — SIGNIFICANT CHANGE UP (ref 3.8–10.5)
WBC # FLD AUTO: 10.5 K/UL — SIGNIFICANT CHANGE UP (ref 3.8–10.5)
WBC # FLD AUTO: 10.74 K/UL — HIGH (ref 3.8–10.5)
WBC # FLD AUTO: 10.95 K/UL — HIGH (ref 3.8–10.5)
WBC # FLD AUTO: 11.1 K/UL — HIGH (ref 3.8–10.5)
WBC # FLD AUTO: 11.17 K/UL — HIGH (ref 3.8–10.5)
WBC # FLD AUTO: 11.54 K/UL — HIGH (ref 3.8–10.5)
WBC # FLD AUTO: 12.15 K/UL — HIGH (ref 3.8–10.5)
WBC # FLD AUTO: 12.41 K/UL — HIGH (ref 3.8–10.5)
WBC # FLD AUTO: 12.7 K/UL — HIGH (ref 3.8–10.5)
WBC # FLD AUTO: 13.49 K/UL — HIGH (ref 3.8–10.5)
WBC # FLD AUTO: 13.83 K/UL — HIGH (ref 3.8–10.5)
WBC # FLD AUTO: 15.25 K/UL — HIGH (ref 3.8–10.5)
WBC # FLD AUTO: 15.43 K/UL — HIGH (ref 3.8–10.5)
WBC # FLD AUTO: 16.13 K/UL — HIGH (ref 3.8–10.5)
WBC # FLD AUTO: 7.28 K/UL — SIGNIFICANT CHANGE UP (ref 3.8–10.5)
WBC # FLD AUTO: 8.85 K/UL — SIGNIFICANT CHANGE UP (ref 3.8–10.5)
WBC # FLD AUTO: 9.66 K/UL — SIGNIFICANT CHANGE UP (ref 3.8–10.5)
WBC # FLD AUTO: 9.84 K/UL — SIGNIFICANT CHANGE UP (ref 3.8–10.5)
WBC # FLD AUTO: 9.94 K/UL — SIGNIFICANT CHANGE UP (ref 3.8–10.5)
WBC # FLD AUTO: 9.97 K/UL — SIGNIFICANT CHANGE UP (ref 3.8–10.5)
WBC # FLD AUTO: 9.98 K/UL — SIGNIFICANT CHANGE UP (ref 3.8–10.5)
WBC UR QL: SIGNIFICANT CHANGE UP /HPF (ref 0–5)

## 2020-01-01 PROCEDURE — 12345: CPT | Mod: NC

## 2020-01-01 PROCEDURE — 82962 GLUCOSE BLOOD TEST: CPT

## 2020-01-01 PROCEDURE — 82553 CREATINE MB FRACTION: CPT

## 2020-01-01 PROCEDURE — 82550 ASSAY OF CK (CPK): CPT

## 2020-01-01 PROCEDURE — 99232 SBSQ HOSP IP/OBS MODERATE 35: CPT

## 2020-01-01 PROCEDURE — 71045 X-RAY EXAM CHEST 1 VIEW: CPT | Mod: 26

## 2020-01-01 PROCEDURE — 85730 THROMBOPLASTIN TIME PARTIAL: CPT

## 2020-01-01 PROCEDURE — 84443 ASSAY THYROID STIM HORMONE: CPT

## 2020-01-01 PROCEDURE — 93005 ELECTROCARDIOGRAM TRACING: CPT

## 2020-01-01 PROCEDURE — 87635 SARS-COV-2 COVID-19 AMP PRB: CPT

## 2020-01-01 PROCEDURE — 97116 GAIT TRAINING THERAPY: CPT

## 2020-01-01 PROCEDURE — 80053 COMPREHEN METABOLIC PANEL: CPT

## 2020-01-01 PROCEDURE — 74177 CT ABD & PELVIS W/CONTRAST: CPT | Mod: 26

## 2020-01-01 PROCEDURE — 84100 ASSAY OF PHOSPHORUS: CPT

## 2020-01-01 PROCEDURE — 85610 PROTHROMBIN TIME: CPT

## 2020-01-01 PROCEDURE — 99215 OFFICE O/P EST HI 40 MIN: CPT | Mod: 25

## 2020-01-01 PROCEDURE — 80076 HEPATIC FUNCTION PANEL: CPT

## 2020-01-01 PROCEDURE — 83036 HEMOGLOBIN GLYCOSYLATED A1C: CPT

## 2020-01-01 PROCEDURE — 97162 PT EVAL MOD COMPLEX 30 MIN: CPT

## 2020-01-01 PROCEDURE — 99205 OFFICE O/P NEW HI 60 MIN: CPT | Mod: 95

## 2020-01-01 PROCEDURE — 99072 ADDL SUPL MATRL&STAF TM PHE: CPT

## 2020-01-01 PROCEDURE — 99239 HOSP IP/OBS DSCHRG MGMT >30: CPT | Mod: GC

## 2020-01-01 PROCEDURE — 36415 COLL VENOUS BLD VENIPUNCTURE: CPT

## 2020-01-01 PROCEDURE — 71045 X-RAY EXAM CHEST 1 VIEW: CPT

## 2020-01-01 PROCEDURE — 99215 OFFICE O/P EST HI 40 MIN: CPT | Mod: 95

## 2020-01-01 PROCEDURE — 73562 X-RAY EXAM OF KNEE 3: CPT | Mod: 26,LT

## 2020-01-01 PROCEDURE — 81003 URINALYSIS AUTO W/O SCOPE: CPT

## 2020-01-01 PROCEDURE — 87086 URINE CULTURE/COLONY COUNT: CPT

## 2020-01-01 PROCEDURE — 99223 1ST HOSP IP/OBS HIGH 75: CPT | Mod: GC

## 2020-01-01 PROCEDURE — 83690 ASSAY OF LIPASE: CPT

## 2020-01-01 PROCEDURE — 85027 COMPLETE CBC AUTOMATED: CPT

## 2020-01-01 PROCEDURE — 82746 ASSAY OF FOLIC ACID SERUM: CPT

## 2020-01-01 PROCEDURE — 83880 ASSAY OF NATRIURETIC PEPTIDE: CPT

## 2020-01-01 PROCEDURE — 51702 INSERT TEMP BLADDER CATH: CPT

## 2020-01-01 PROCEDURE — 83735 ASSAY OF MAGNESIUM: CPT

## 2020-01-01 PROCEDURE — 85379 FIBRIN DEGRADATION QUANT: CPT

## 2020-01-01 PROCEDURE — 86769 SARS-COV-2 COVID-19 ANTIBODY: CPT

## 2020-01-01 PROCEDURE — 87040 BLOOD CULTURE FOR BACTERIA: CPT

## 2020-01-01 PROCEDURE — 82728 ASSAY OF FERRITIN: CPT

## 2020-01-01 PROCEDURE — 84484 ASSAY OF TROPONIN QUANT: CPT

## 2020-01-01 PROCEDURE — 96374 THER/PROPH/DIAG INJ IV PUSH: CPT | Mod: XU

## 2020-01-01 PROCEDURE — 82803 BLOOD GASES ANY COMBINATION: CPT

## 2020-01-01 PROCEDURE — 99285 EMERGENCY DEPT VISIT HI MDM: CPT

## 2020-01-01 PROCEDURE — 85025 COMPLETE CBC W/AUTO DIFF WBC: CPT

## 2020-01-01 PROCEDURE — 73562 X-RAY EXAM OF KNEE 3: CPT

## 2020-01-01 PROCEDURE — 96116 NUBHVL XM PHYS/QHP 1ST HR: CPT | Mod: 59

## 2020-01-01 PROCEDURE — 99233 SBSQ HOSP IP/OBS HIGH 50: CPT

## 2020-01-01 PROCEDURE — 99285 EMERGENCY DEPT VISIT HI MDM: CPT | Mod: 25

## 2020-01-01 PROCEDURE — 82607 VITAMIN B-12: CPT

## 2020-01-01 PROCEDURE — 80048 BASIC METABOLIC PNL TOTAL CA: CPT

## 2020-01-01 PROCEDURE — 94640 AIRWAY INHALATION TREATMENT: CPT

## 2020-01-01 PROCEDURE — 86780 TREPONEMA PALLIDUM: CPT

## 2020-01-01 PROCEDURE — 99223 1ST HOSP IP/OBS HIGH 75: CPT

## 2020-01-01 PROCEDURE — 70450 CT HEAD/BRAIN W/O DYE: CPT

## 2020-01-01 PROCEDURE — 99284 EMERGENCY DEPT VISIT MOD MDM: CPT | Mod: 25

## 2020-01-01 PROCEDURE — 81001 URINALYSIS AUTO W/SCOPE: CPT

## 2020-01-01 PROCEDURE — 93880 EXTRACRANIAL BILAT STUDY: CPT

## 2020-01-01 PROCEDURE — 87640 STAPH A DNA AMP PROBE: CPT

## 2020-01-01 PROCEDURE — 87641 MR-STAPH DNA AMP PROBE: CPT

## 2020-01-01 PROCEDURE — 84145 PROCALCITONIN (PCT): CPT

## 2020-01-01 PROCEDURE — 93970 EXTREMITY STUDY: CPT | Mod: 26

## 2020-01-01 PROCEDURE — 96374 THER/PROPH/DIAG INJ IV PUSH: CPT

## 2020-01-01 PROCEDURE — 93306 TTE W/DOPPLER COMPLETE: CPT

## 2020-01-01 PROCEDURE — 83540 ASSAY OF IRON: CPT

## 2020-01-01 PROCEDURE — A9502: CPT

## 2020-01-01 PROCEDURE — 99222 1ST HOSP IP/OBS MODERATE 55: CPT

## 2020-01-01 PROCEDURE — 80061 LIPID PANEL: CPT

## 2020-01-01 PROCEDURE — 93017 CV STRESS TEST TRACING ONLY: CPT

## 2020-01-01 PROCEDURE — 83605 ASSAY OF LACTIC ACID: CPT

## 2020-01-01 PROCEDURE — 95957 EEG DIGITAL ANALYSIS: CPT

## 2020-01-01 PROCEDURE — 93970 EXTREMITY STUDY: CPT

## 2020-01-01 PROCEDURE — 73562 X-RAY EXAM OF KNEE 3: CPT | Mod: 26,50

## 2020-01-01 PROCEDURE — 70450 CT HEAD/BRAIN W/O DYE: CPT | Mod: 26

## 2020-01-01 PROCEDURE — 87186 SC STD MICRODIL/AGAR DIL: CPT

## 2020-01-01 PROCEDURE — 72170 X-RAY EXAM OF PELVIS: CPT

## 2020-01-01 PROCEDURE — 87631 RESP VIRUS 3-5 TARGETS: CPT

## 2020-01-01 PROCEDURE — 96375 TX/PRO/DX INJ NEW DRUG ADDON: CPT

## 2020-01-01 PROCEDURE — 99232 SBSQ HOSP IP/OBS MODERATE 35: CPT | Mod: GC

## 2020-01-01 PROCEDURE — 95819 EEG AWAKE AND ASLEEP: CPT

## 2020-01-01 PROCEDURE — 85652 RBC SED RATE AUTOMATED: CPT

## 2020-01-01 PROCEDURE — 99204 OFFICE O/P NEW MOD 45 MIN: CPT

## 2020-01-01 PROCEDURE — 83615 LACTATE (LD) (LDH) ENZYME: CPT

## 2020-01-01 PROCEDURE — 82306 VITAMIN D 25 HYDROXY: CPT

## 2020-01-01 PROCEDURE — 95819 EEG AWAKE AND ASLEEP: CPT | Mod: 26

## 2020-01-01 PROCEDURE — 99239 HOSP IP/OBS DSCHRG MGMT >30: CPT

## 2020-01-01 PROCEDURE — 83550 IRON BINDING TEST: CPT

## 2020-01-01 PROCEDURE — 99215 OFFICE O/P EST HI 40 MIN: CPT

## 2020-01-01 PROCEDURE — 86140 C-REACTIVE PROTEIN: CPT

## 2020-01-01 PROCEDURE — 72170 X-RAY EXAM OF PELVIS: CPT | Mod: 26

## 2020-01-01 PROCEDURE — 97530 THERAPEUTIC ACTIVITIES: CPT

## 2020-01-01 PROCEDURE — U0003: CPT

## 2020-01-01 PROCEDURE — 78452 HT MUSCLE IMAGE SPECT MULT: CPT

## 2020-01-01 PROCEDURE — 93010 ELECTROCARDIOGRAM REPORT: CPT

## 2020-01-01 PROCEDURE — 73523 X-RAY EXAM HIPS BI 5/> VIEWS: CPT | Mod: 26

## 2020-01-01 PROCEDURE — 93888 INTRACRANIAL LIMITED STUDY: CPT

## 2020-01-01 PROCEDURE — 74177 CT ABD & PELVIS W/CONTRAST: CPT

## 2020-01-01 PROCEDURE — 99214 OFFICE O/P EST MOD 30 MIN: CPT

## 2020-01-01 PROCEDURE — 71250 CT THORAX DX C-: CPT | Mod: 26

## 2020-01-01 PROCEDURE — 0225U NFCT DS DNA&RNA 21 SARSCOV2: CPT

## 2020-01-01 PROCEDURE — 71250 CT THORAX DX C-: CPT

## 2020-01-01 PROCEDURE — 73522 X-RAY EXAM HIPS BI 3-4 VIEWS: CPT

## 2020-01-01 PROCEDURE — 99284 EMERGENCY DEPT VISIT MOD MDM: CPT

## 2020-01-01 PROCEDURE — 92610 EVALUATE SWALLOWING FUNCTION: CPT

## 2020-01-01 PROCEDURE — 93880 EXTRACRANIAL BILAT STUDY: CPT | Mod: 26

## 2020-01-01 RX ORDER — POTASSIUM CHLORIDE 20 MEQ
40 PACKET (EA) ORAL ONCE
Refills: 0 | Status: COMPLETED | OUTPATIENT
Start: 2020-01-01 | End: 2020-01-01

## 2020-01-01 RX ORDER — FAMOTIDINE 10 MG/ML
20 INJECTION INTRAVENOUS ONCE
Refills: 0 | Status: COMPLETED | OUTPATIENT
Start: 2020-01-01 | End: 2020-01-01

## 2020-01-01 RX ORDER — PIOGLITAZONE HYDROCHLORIDE 15 MG/1
0 TABLET ORAL
Qty: 0 | Refills: 1 | DISCHARGE

## 2020-01-01 RX ORDER — DONEPEZIL HYDROCHLORIDE 10 MG/1
1 TABLET, FILM COATED ORAL
Qty: 0 | Refills: 0 | DISCHARGE
Start: 2020-01-01

## 2020-01-01 RX ORDER — LOSARTAN POTASSIUM 100 MG/1
25 TABLET, FILM COATED ORAL
Refills: 0 | Status: DISCONTINUED | OUTPATIENT
Start: 2020-01-01 | End: 2020-01-01

## 2020-01-01 RX ORDER — AMLODIPINE BESYLATE 2.5 MG/1
5 TABLET ORAL DAILY
Refills: 0 | Status: DISCONTINUED | OUTPATIENT
Start: 2020-01-01 | End: 2020-01-01

## 2020-01-01 RX ORDER — DEXAMETHASONE 0.5 MG/5ML
6 ELIXIR ORAL ONCE
Refills: 0 | Status: COMPLETED | OUTPATIENT
Start: 2020-01-01 | End: 2020-01-01

## 2020-01-01 RX ORDER — ACETAZOLAMIDE 250 MG/1
250 TABLET ORAL
Refills: 0 | Status: DISCONTINUED | OUTPATIENT
Start: 2020-01-01 | End: 2020-01-01

## 2020-01-01 RX ORDER — INSULIN LISPRO 100/ML
VIAL (ML) SUBCUTANEOUS
Refills: 0 | Status: DISCONTINUED | OUTPATIENT
Start: 2020-01-01 | End: 2020-01-01

## 2020-01-01 RX ORDER — FUROSEMIDE 40 MG
40 TABLET ORAL DAILY
Refills: 0 | Status: DISCONTINUED | OUTPATIENT
Start: 2020-01-01 | End: 2020-01-01

## 2020-01-01 RX ORDER — APIXABAN 2.5 MG/1
5 TABLET, FILM COATED ORAL
Refills: 0 | Status: DISCONTINUED | OUTPATIENT
Start: 2020-01-01 | End: 2020-01-01

## 2020-01-01 RX ORDER — LIDOCAINE 4 G/100G
1 CREAM TOPICAL THREE TIMES A DAY
Refills: 0 | Status: DISCONTINUED | OUTPATIENT
Start: 2020-01-01 | End: 2020-01-01

## 2020-01-01 RX ORDER — INSULIN GLARGINE 100 [IU]/ML
30 INJECTION, SOLUTION SUBCUTANEOUS ONCE
Refills: 0 | Status: COMPLETED | OUTPATIENT
Start: 2020-01-01 | End: 2020-01-01

## 2020-01-01 RX ORDER — ASPIRIN/CALCIUM CARB/MAGNESIUM 324 MG
1 TABLET ORAL
Qty: 0 | Refills: 0 | DISCHARGE
Start: 2020-01-01

## 2020-01-01 RX ORDER — INSULIN LISPRO 100/ML
3 VIAL (ML) SUBCUTANEOUS
Refills: 0 | Status: DISCONTINUED | OUTPATIENT
Start: 2020-01-01 | End: 2020-01-01

## 2020-01-01 RX ORDER — HALOPERIDOL DECANOATE 100 MG/ML
5 INJECTION INTRAMUSCULAR ONCE
Refills: 0 | Status: COMPLETED | OUTPATIENT
Start: 2020-01-01 | End: 2020-01-01

## 2020-01-01 RX ORDER — LOSARTAN POTASSIUM 100 MG/1
0 TABLET, FILM COATED ORAL
Qty: 0 | Refills: 0 | DISCHARGE

## 2020-01-01 RX ORDER — GLIPIZIDE 10 MG/1
10 TABLET, FILM COATED, EXTENDED RELEASE ORAL DAILY
Qty: 30 | Refills: 0 | Status: ACTIVE | COMMUNITY
Start: 2020-01-01

## 2020-01-01 RX ORDER — ATORVASTATIN CALCIUM 80 MG/1
40 TABLET, FILM COATED ORAL AT BEDTIME
Refills: 0 | Status: DISCONTINUED | OUTPATIENT
Start: 2020-01-01 | End: 2020-01-01

## 2020-01-01 RX ORDER — IPRATROPIUM/ALBUTEROL SULFATE 18-103MCG
3 AEROSOL WITH ADAPTER (GRAM) INHALATION ONCE
Refills: 0 | Status: COMPLETED | OUTPATIENT
Start: 2020-01-01 | End: 2020-01-01

## 2020-01-01 RX ORDER — METOPROLOL TARTRATE 50 MG
1 TABLET ORAL
Qty: 0 | Refills: 0 | DISCHARGE
Start: 2020-01-01

## 2020-01-01 RX ORDER — DEXTROSE 50 % IN WATER 50 %
25 SYRINGE (ML) INTRAVENOUS ONCE
Refills: 0 | Status: DISCONTINUED | OUTPATIENT
Start: 2020-01-01 | End: 2020-01-01

## 2020-01-01 RX ORDER — ASPIRIN/CALCIUM CARB/MAGNESIUM 324 MG
81 TABLET ORAL DAILY
Refills: 0 | Status: DISCONTINUED | OUTPATIENT
Start: 2020-01-01 | End: 2020-01-01

## 2020-01-01 RX ORDER — CLOPIDOGREL BISULFATE 75 MG/1
75 TABLET, FILM COATED ORAL DAILY
Refills: 0 | Status: DISCONTINUED | OUTPATIENT
Start: 2020-01-01 | End: 2020-01-01

## 2020-01-01 RX ORDER — FUROSEMIDE 40 MG
0 TABLET ORAL
Qty: 0 | Refills: 0 | DISCHARGE

## 2020-01-01 RX ORDER — LOSARTAN POTASSIUM 100 MG/1
1 TABLET, FILM COATED ORAL
Qty: 0 | Refills: 0 | DISCHARGE
Start: 2020-01-01

## 2020-01-01 RX ORDER — ATORVASTATIN CALCIUM 40 MG/1
40 TABLET, FILM COATED ORAL
Qty: 30 | Refills: 2 | Status: ACTIVE | COMMUNITY
Start: 2020-01-01

## 2020-01-01 RX ORDER — HYDROMORPHONE HYDROCHLORIDE 2 MG/ML
0.5 INJECTION INTRAMUSCULAR; INTRAVENOUS; SUBCUTANEOUS ONCE
Refills: 0 | Status: DISCONTINUED | OUTPATIENT
Start: 2020-01-01 | End: 2020-01-01

## 2020-01-01 RX ORDER — INSULIN GLARGINE 100 [IU]/ML
15 INJECTION, SOLUTION SUBCUTANEOUS AT BEDTIME
Refills: 0 | Status: DISCONTINUED | OUTPATIENT
Start: 2020-01-01 | End: 2020-01-01

## 2020-01-01 RX ORDER — ACETAZOLAMIDE 250 MG/1
1 TABLET ORAL
Qty: 4 | Refills: 0
Start: 2020-01-01 | End: 2020-01-01

## 2020-01-01 RX ORDER — DEXTROSE 50 % IN WATER 50 %
12.5 SYRINGE (ML) INTRAVENOUS ONCE
Refills: 0 | Status: DISCONTINUED | OUTPATIENT
Start: 2020-01-01 | End: 2020-01-01

## 2020-01-01 RX ORDER — COLD-HOT PACK
125 MCG EACH MISCELLANEOUS
Qty: 30 | Refills: 0 | Status: ACTIVE | COMMUNITY
Start: 2020-01-01

## 2020-01-01 RX ORDER — ENOXAPARIN SODIUM 100 MG/ML
40 INJECTION SUBCUTANEOUS DAILY
Refills: 0 | Status: DISCONTINUED | OUTPATIENT
Start: 2020-01-01 | End: 2020-01-01

## 2020-01-01 RX ORDER — FUROSEMIDE 40 MG
1 TABLET ORAL
Qty: 30 | Refills: 0
Start: 2020-01-01 | End: 2020-01-01

## 2020-01-01 RX ORDER — DONEPEZIL HYDROCHLORIDE 10 MG/1
10 TABLET, FILM COATED ORAL AT BEDTIME
Refills: 0 | Status: DISCONTINUED | OUTPATIENT
Start: 2020-01-01 | End: 2020-01-01

## 2020-01-01 RX ORDER — METFORMIN HYDROCHLORIDE 850 MG/1
1 TABLET ORAL
Qty: 0 | Refills: 0 | DISCHARGE

## 2020-01-01 RX ORDER — DONEPEZIL HYDROCHLORIDE 10 MG/1
0 TABLET, FILM COATED ORAL
Qty: 0 | Refills: 0 | DISCHARGE

## 2020-01-01 RX ORDER — AZITHROMYCIN 500 MG/1
TABLET, FILM COATED ORAL
Refills: 0 | Status: DISCONTINUED | OUTPATIENT
Start: 2020-01-01 | End: 2020-01-01

## 2020-01-01 RX ORDER — INSULIN LISPRO 100/ML
8 VIAL (ML) SUBCUTANEOUS
Refills: 0 | Status: DISCONTINUED | OUTPATIENT
Start: 2020-01-01 | End: 2020-01-01

## 2020-01-01 RX ORDER — LISINOPRIL 2.5 MG/1
1 TABLET ORAL
Qty: 0 | Refills: 0 | DISCHARGE

## 2020-01-01 RX ORDER — CEFTRIAXONE 500 MG/1
1000 INJECTION, POWDER, FOR SOLUTION INTRAMUSCULAR; INTRAVENOUS ONCE
Refills: 0 | Status: COMPLETED | OUTPATIENT
Start: 2020-01-01 | End: 2020-01-01

## 2020-01-01 RX ORDER — FUROSEMIDE 40 MG
40 TABLET ORAL ONCE
Refills: 0 | Status: COMPLETED | OUTPATIENT
Start: 2020-01-01 | End: 2020-01-01

## 2020-01-01 RX ORDER — SODIUM CHLORIDE 9 MG/ML
1000 INJECTION INTRAMUSCULAR; INTRAVENOUS; SUBCUTANEOUS ONCE
Refills: 0 | Status: COMPLETED | OUTPATIENT
Start: 2020-01-01 | End: 2020-01-01

## 2020-01-01 RX ORDER — METOPROLOL TARTRATE 50 MG
0 TABLET ORAL
Qty: 0 | Refills: 0 | DISCHARGE

## 2020-01-01 RX ORDER — ATORVASTATIN CALCIUM 80 MG/1
1 TABLET, FILM COATED ORAL
Qty: 0 | Refills: 0 | DISCHARGE
Start: 2020-01-01

## 2020-01-01 RX ORDER — METOPROLOL TARTRATE 50 MG
25 TABLET ORAL
Refills: 0 | Status: DISCONTINUED | OUTPATIENT
Start: 2020-01-01 | End: 2020-01-01

## 2020-01-01 RX ORDER — POTASSIUM CHLORIDE 20 MEQ
0 PACKET (EA) ORAL
Qty: 0 | Refills: 3 | DISCHARGE

## 2020-01-01 RX ORDER — METOPROLOL TARTRATE 50 MG
1 TABLET ORAL
Qty: 30 | Refills: 0
Start: 2020-01-01 | End: 2020-01-01

## 2020-01-01 RX ORDER — METOPROLOL TARTRATE 50 MG
1 TABLET ORAL
Qty: 0 | Refills: 0 | DISCHARGE

## 2020-01-01 RX ORDER — CLOPIDOGREL BISULFATE 75 MG/1
1 TABLET, FILM COATED ORAL
Qty: 30 | Refills: 0
Start: 2020-01-01 | End: 2020-01-01

## 2020-01-01 RX ORDER — CEFTRIAXONE 500 MG/1
INJECTION, POWDER, FOR SOLUTION INTRAMUSCULAR; INTRAVENOUS
Refills: 0 | Status: DISCONTINUED | OUTPATIENT
Start: 2020-01-01 | End: 2020-01-01

## 2020-01-01 RX ORDER — AZITHROMYCIN 500 MG/1
500 TABLET, FILM COATED ORAL ONCE
Refills: 0 | Status: COMPLETED | OUTPATIENT
Start: 2020-01-01 | End: 2020-01-01

## 2020-01-01 RX ORDER — CLOTRIMAZOLE AND BETAMETHASONE DIPROPIONATE 10; .5 MG/G; MG/G
0 CREAM TOPICAL
Qty: 0 | Refills: 0 | DISCHARGE

## 2020-01-01 RX ORDER — AMLODIPINE BESYLATE 5 MG/1
5 TABLET ORAL DAILY
Qty: 30 | Refills: 0 | Status: ACTIVE | COMMUNITY
Start: 2020-01-01

## 2020-01-01 RX ORDER — AMLODIPINE BESYLATE 2.5 MG/1
0.5 TABLET ORAL
Qty: 0 | Refills: 0 | DISCHARGE

## 2020-01-01 RX ORDER — SODIUM CHLORIDE 9 MG/ML
1000 INJECTION, SOLUTION INTRAVENOUS
Refills: 0 | Status: DISCONTINUED | OUTPATIENT
Start: 2020-01-01 | End: 2020-01-01

## 2020-01-01 RX ORDER — BENZOCAINE AND MENTHOL 5; 1 G/100ML; G/100ML
1 LIQUID ORAL EVERY 4 HOURS
Refills: 0 | Status: DISCONTINUED | OUTPATIENT
Start: 2020-01-01 | End: 2020-01-01

## 2020-01-01 RX ORDER — HALOPERIDOL DECANOATE 100 MG/ML
0.5 INJECTION INTRAMUSCULAR ONCE
Refills: 0 | Status: COMPLETED | OUTPATIENT
Start: 2020-01-01 | End: 2020-01-01

## 2020-01-01 RX ORDER — METFORMIN HYDROCHLORIDE 1000 MG/1
1000 TABLET, COATED ORAL
Qty: 60 | Refills: 0 | Status: COMPLETED | COMMUNITY
Start: 2020-01-01 | End: 2020-01-01

## 2020-01-01 RX ORDER — POTASSIUM CHLORIDE 20 MEQ
40 PACKET (EA) ORAL EVERY 4 HOURS
Refills: 0 | Status: COMPLETED | OUTPATIENT
Start: 2020-01-01 | End: 2020-01-01

## 2020-01-01 RX ORDER — DONEPEZIL HYDROCHLORIDE 10 MG/1
10 TABLET ORAL
Qty: 90 | Refills: 0 | Status: ACTIVE | COMMUNITY
Start: 2020-01-01 | End: 1900-01-01

## 2020-01-01 RX ORDER — LOSARTAN POTASSIUM 100 MG/1
100 TABLET, FILM COATED ORAL DAILY
Qty: 1 | Refills: 0 | Status: ACTIVE | COMMUNITY
Start: 2020-01-01

## 2020-01-01 RX ORDER — AMLODIPINE BESYLATE 2.5 MG/1
1 TABLET ORAL
Qty: 0 | Refills: 0 | DISCHARGE

## 2020-01-01 RX ORDER — SODIUM CHLORIDE 9 MG/ML
1000 INJECTION INTRAMUSCULAR; INTRAVENOUS; SUBCUTANEOUS
Refills: 0 | Status: DISCONTINUED | OUTPATIENT
Start: 2020-01-01 | End: 2020-01-01

## 2020-01-01 RX ORDER — ONDANSETRON 8 MG/1
4 TABLET, FILM COATED ORAL EVERY 8 HOURS
Refills: 0 | Status: DISCONTINUED | OUTPATIENT
Start: 2020-01-01 | End: 2020-01-01

## 2020-01-01 RX ORDER — LOSARTAN POTASSIUM 100 MG/1
50 TABLET, FILM COATED ORAL DAILY
Refills: 0 | Status: DISCONTINUED | OUTPATIENT
Start: 2020-01-01 | End: 2020-01-01

## 2020-01-01 RX ORDER — METOPROLOL TARTRATE 50 MG
12.5 TABLET ORAL
Refills: 0 | Status: DISCONTINUED | OUTPATIENT
Start: 2020-01-01 | End: 2020-01-01

## 2020-01-01 RX ORDER — INSULIN GLARGINE 100 [IU]/ML
30 INJECTION, SOLUTION SUBCUTANEOUS EVERY MORNING
Refills: 0 | Status: DISCONTINUED | OUTPATIENT
Start: 2020-01-01 | End: 2020-01-01

## 2020-01-01 RX ORDER — LANOLIN ALCOHOL/MO/W.PET/CERES
5 CREAM (GRAM) TOPICAL ONCE
Refills: 0 | Status: COMPLETED | OUTPATIENT
Start: 2020-01-01 | End: 2020-01-01

## 2020-01-01 RX ORDER — PREGABALIN 225 MG/1
1000 CAPSULE ORAL DAILY
Refills: 0 | Status: DISCONTINUED | OUTPATIENT
Start: 2020-01-01 | End: 2020-01-01

## 2020-01-01 RX ORDER — CEFTRIAXONE 500 MG/1
1000 INJECTION, POWDER, FOR SOLUTION INTRAMUSCULAR; INTRAVENOUS EVERY 24 HOURS
Refills: 0 | Status: COMPLETED | OUTPATIENT
Start: 2020-01-01 | End: 2020-01-01

## 2020-01-01 RX ORDER — DONEPEZIL HYDROCHLORIDE 5 MG/1
5 TABLET ORAL
Qty: 90 | Refills: 0 | Status: DISCONTINUED | COMMUNITY
Start: 2020-01-01 | End: 2020-01-01

## 2020-01-01 RX ORDER — AZITHROMYCIN 500 MG/1
500 TABLET, FILM COATED ORAL EVERY 24 HOURS
Refills: 0 | Status: DISCONTINUED | OUTPATIENT
Start: 2020-01-01 | End: 2020-01-01

## 2020-01-01 RX ORDER — OLANZAPINE 15 MG/1
2.5 TABLET, FILM COATED ORAL DAILY
Refills: 0 | Status: DISCONTINUED | OUTPATIENT
Start: 2020-01-01 | End: 2020-01-01

## 2020-01-01 RX ORDER — SITAGLIPTIN 50 MG/1
0 TABLET, FILM COATED ORAL
Qty: 0 | Refills: 3 | DISCHARGE

## 2020-01-01 RX ORDER — ACETAMINOPHEN 500 MG
2 TABLET ORAL
Qty: 0 | Refills: 0 | DISCHARGE
Start: 2020-01-01

## 2020-01-01 RX ORDER — INSULIN GLARGINE 100 [IU]/ML
9 INJECTION, SOLUTION SUBCUTANEOUS AT BEDTIME
Refills: 0 | Status: DISCONTINUED | OUTPATIENT
Start: 2020-01-01 | End: 2020-01-01

## 2020-01-01 RX ORDER — AMLODIPINE BESYLATE 2.5 MG/1
1 TABLET ORAL
Qty: 30 | Refills: 0
Start: 2020-01-01 | End: 2020-01-01

## 2020-01-01 RX ORDER — TRAMADOL HYDROCHLORIDE 50 MG/1
50 TABLET ORAL ONCE
Refills: 0 | Status: DISCONTINUED | OUTPATIENT
Start: 2020-01-01 | End: 2020-01-01

## 2020-01-01 RX ORDER — POTASSIUM CHLORIDE 20 MEQ
10 PACKET (EA) ORAL
Refills: 0 | Status: COMPLETED | OUTPATIENT
Start: 2020-01-01 | End: 2020-01-01

## 2020-01-01 RX ORDER — INSULIN GLARGINE 100 [IU]/ML
30 INJECTION, SOLUTION SUBCUTANEOUS
Qty: 0 | Refills: 0 | DISCHARGE
Start: 2020-01-01

## 2020-01-01 RX ORDER — METOPROLOL TARTRATE 50 MG
25 TABLET ORAL DAILY
Refills: 0 | Status: DISCONTINUED | OUTPATIENT
Start: 2020-01-01 | End: 2020-01-01

## 2020-01-01 RX ORDER — IBUPROFEN 200 MG
400 TABLET ORAL
Refills: 0 | Status: DISCONTINUED | OUTPATIENT
Start: 2020-01-01 | End: 2020-01-01

## 2020-01-01 RX ORDER — SITAGLIPTIN 50 MG/1
1 TABLET, FILM COATED ORAL
Qty: 30 | Refills: 0
Start: 2020-01-01 | End: 2020-01-01

## 2020-01-01 RX ORDER — LANOLIN ALCOHOL/MO/W.PET/CERES
3 CREAM (GRAM) TOPICAL AT BEDTIME
Refills: 0 | Status: DISCONTINUED | OUTPATIENT
Start: 2020-01-01 | End: 2020-01-01

## 2020-01-01 RX ORDER — SODIUM CHLORIDE 9 MG/ML
500 INJECTION INTRAMUSCULAR; INTRAVENOUS; SUBCUTANEOUS ONCE
Refills: 0 | Status: COMPLETED | OUTPATIENT
Start: 2020-01-01 | End: 2020-01-01

## 2020-01-01 RX ORDER — APIXABAN 2.5 MG/1
2.5 TABLET, FILM COATED ORAL DAILY
Refills: 0 | Status: ACTIVE | COMMUNITY

## 2020-01-01 RX ORDER — ACETAMINOPHEN 500 MG
650 TABLET ORAL EVERY 6 HOURS
Refills: 0 | Status: DISCONTINUED | OUTPATIENT
Start: 2020-01-01 | End: 2020-01-01

## 2020-01-01 RX ORDER — PIOGLITAZONE HYDROCHLORIDE 30 MG/1
30 TABLET ORAL DAILY
Qty: 30 | Refills: 0 | Status: COMPLETED | COMMUNITY
Start: 2020-01-01 | End: 2020-01-01

## 2020-01-01 RX ORDER — ERGOCALCIFEROL 1.25 MG/1
50000 CAPSULE ORAL ONCE
Refills: 0 | Status: COMPLETED | OUTPATIENT
Start: 2020-01-01 | End: 2020-01-01

## 2020-01-01 RX ORDER — ACETAMINOPHEN 500 MG
1000 TABLET ORAL ONCE
Refills: 0 | Status: COMPLETED | OUTPATIENT
Start: 2020-01-01 | End: 2020-01-01

## 2020-01-01 RX ORDER — APIXABAN 2.5 MG/1
1 TABLET, FILM COATED ORAL
Qty: 60 | Refills: 0
Start: 2020-01-01 | End: 2020-01-01

## 2020-01-01 RX ORDER — GLUCAGON INJECTION, SOLUTION 0.5 MG/.1ML
1 INJECTION, SOLUTION SUBCUTANEOUS ONCE
Refills: 0 | Status: DISCONTINUED | OUTPATIENT
Start: 2020-01-01 | End: 2020-01-01

## 2020-01-01 RX ORDER — SODIUM,POTASSIUM PHOSPHATES 278-250MG
1 POWDER IN PACKET (EA) ORAL
Refills: 0 | Status: COMPLETED | OUTPATIENT
Start: 2020-01-01 | End: 2020-01-01

## 2020-01-01 RX ORDER — INSULIN LISPRO 100/ML
VIAL (ML) SUBCUTANEOUS EVERY 6 HOURS
Refills: 0 | Status: DISCONTINUED | OUTPATIENT
Start: 2020-01-01 | End: 2020-01-01

## 2020-01-01 RX ORDER — SITAGLIPTIN 50 MG/1
50 TABLET, FILM COATED ORAL
Qty: 30 | Refills: 0 | Status: ACTIVE | COMMUNITY
Start: 2020-01-01

## 2020-01-01 RX ORDER — SIMVASTATIN 20 MG/1
20 TABLET, FILM COATED ORAL AT BEDTIME
Refills: 0 | Status: DISCONTINUED | OUTPATIENT
Start: 2020-01-01 | End: 2020-01-01

## 2020-01-01 RX ORDER — DABIGATRAN ETEXILATE MESYLATE 150 MG/1
150 CAPSULE ORAL TWICE DAILY
Qty: 60 | Refills: 0 | Status: DISCONTINUED | COMMUNITY
Start: 2020-01-01 | End: 2020-01-01

## 2020-01-01 RX ORDER — ASPIRIN/CALCIUM CARB/MAGNESIUM 324 MG
0.5 TABLET ORAL
Qty: 0 | Refills: 0 | DISCHARGE
Start: 2020-01-01

## 2020-01-01 RX ORDER — SODIUM,POTASSIUM PHOSPHATES 278-250MG
1 POWDER IN PACKET (EA) ORAL THREE TIMES A DAY
Refills: 0 | Status: COMPLETED | OUTPATIENT
Start: 2020-01-01 | End: 2020-01-01

## 2020-01-01 RX ORDER — ERGOCALCIFEROL 1.25 MG/1
50000 CAPSULE ORAL
Refills: 0 | Status: DISCONTINUED | OUTPATIENT
Start: 2020-01-01 | End: 2020-01-01

## 2020-01-01 RX ORDER — INSULIN LISPRO 100/ML
5 VIAL (ML) SUBCUTANEOUS
Refills: 0 | Status: DISCONTINUED | OUTPATIENT
Start: 2020-01-01 | End: 2020-01-01

## 2020-01-01 RX ORDER — ERGOCALCIFEROL 1.25 MG/1
1 CAPSULE ORAL
Qty: 7 | Refills: 0
Start: 2020-01-01 | End: 2020-01-01

## 2020-01-01 RX ORDER — APIXABAN 2.5 MG/1
0 TABLET, FILM COATED ORAL
Qty: 0 | Refills: 0 | DISCHARGE

## 2020-01-01 RX ORDER — POTASSIUM CHLORIDE 20 MEQ
40 PACKET (EA) ORAL EVERY 4 HOURS
Refills: 0 | Status: DISCONTINUED | OUTPATIENT
Start: 2020-01-01 | End: 2020-01-01

## 2020-01-01 RX ORDER — ATORVASTATIN CALCIUM 80 MG/1
0 TABLET, FILM COATED ORAL
Qty: 0 | Refills: 0 | DISCHARGE

## 2020-01-01 RX ORDER — INSULIN GLARGINE 100 [IU]/ML
7 INJECTION, SOLUTION SUBCUTANEOUS AT BEDTIME
Refills: 0 | Status: DISCONTINUED | OUTPATIENT
Start: 2020-01-01 | End: 2020-01-01

## 2020-01-01 RX ORDER — DEXTROSE 50 % IN WATER 50 %
15 SYRINGE (ML) INTRAVENOUS ONCE
Refills: 0 | Status: DISCONTINUED | OUTPATIENT
Start: 2020-01-01 | End: 2020-01-01

## 2020-01-01 RX ORDER — ADHESIVE TAPE 3"X 2.3 YD
50 MCG TAPE, NON-MEDICATED TOPICAL
Qty: 30 | Refills: 0 | Status: DISCONTINUED | COMMUNITY
Start: 2020-01-01 | End: 2020-01-01

## 2020-01-01 RX ORDER — NYSTATIN CREAM 100000 [USP'U]/G
1 CREAM TOPICAL THREE TIMES A DAY
Refills: 0 | Status: DISCONTINUED | OUTPATIENT
Start: 2020-01-01 | End: 2020-01-01

## 2020-01-01 RX ORDER — METOPROLOL TARTRATE 50 MG
12.5 TABLET ORAL
Qty: 30 | Refills: 0
Start: 2020-01-01 | End: 2020-01-01

## 2020-01-01 RX ORDER — SIMVASTATIN 20 MG/1
1 TABLET, FILM COATED ORAL
Qty: 0 | Refills: 0 | DISCHARGE

## 2020-01-01 RX ORDER — INSULIN GLARGINE 100 [IU]/ML
5 INJECTION, SOLUTION SUBCUTANEOUS AT BEDTIME
Refills: 0 | Status: DISCONTINUED | OUTPATIENT
Start: 2020-01-01 | End: 2020-01-01

## 2020-01-01 RX ORDER — AMLODIPINE BESYLATE 2.5 MG/1
0 TABLET ORAL
Qty: 0 | Refills: 0 | DISCHARGE

## 2020-01-01 RX ORDER — ACETAMINOPHEN 325 MG/1
325 TABLET ORAL EVERY 6 HOURS
Qty: 120 | Refills: 0 | Status: ACTIVE | COMMUNITY
Start: 2020-01-01

## 2020-01-01 RX ORDER — NYSTATIN CREAM 100000 [USP'U]/G
1 CREAM TOPICAL
Refills: 0 | Status: DISCONTINUED | OUTPATIENT
Start: 2020-01-01 | End: 2020-01-01

## 2020-01-01 RX ORDER — APIXABAN 2.5 MG/1
2.5 TABLET, FILM COATED ORAL
Refills: 0 | Status: DISCONTINUED | OUTPATIENT
Start: 2020-01-01 | End: 2020-01-01

## 2020-01-01 RX ORDER — POTASSIUM CHLORIDE 20 MEQ
20 PACKET (EA) ORAL ONCE
Refills: 0 | Status: COMPLETED | OUTPATIENT
Start: 2020-01-01 | End: 2020-01-01

## 2020-01-01 RX ORDER — TRAMADOL HYDROCHLORIDE 50 MG/1
0 TABLET ORAL
Qty: 0 | Refills: 0 | DISCHARGE

## 2020-01-01 RX ORDER — LOSARTAN POTASSIUM 100 MG/1
25 TABLET, FILM COATED ORAL DAILY
Refills: 0 | Status: DISCONTINUED | OUTPATIENT
Start: 2020-01-01 | End: 2020-01-01

## 2020-01-01 RX ORDER — METOPROLOL TARTRATE 50 MG/1
50 TABLET, FILM COATED ORAL
Qty: 60 | Refills: 0 | Status: ACTIVE | COMMUNITY
Start: 2020-01-01

## 2020-01-01 RX ORDER — IBUPROFEN 200 MG
400 TABLET ORAL
Refills: 0 | Status: COMPLETED | OUTPATIENT
Start: 2020-01-01 | End: 2020-01-01

## 2020-01-01 RX ORDER — FUROSEMIDE 40 MG
20 TABLET ORAL DAILY
Refills: 0 | Status: DISCONTINUED | OUTPATIENT
Start: 2020-01-01 | End: 2020-01-01

## 2020-01-01 RX ORDER — FUROSEMIDE 20 MG/1
20 TABLET ORAL TWICE DAILY
Qty: 60 | Refills: 0 | Status: ACTIVE | COMMUNITY
Start: 2020-01-01

## 2020-01-01 RX ORDER — POTASSIUM CHLORIDE 750 MG/1
10 TABLET, EXTENDED RELEASE ORAL DAILY
Qty: 30 | Refills: 0 | Status: ACTIVE | COMMUNITY
Start: 2020-01-01

## 2020-01-01 RX ORDER — KRILL/OM-3/DHA/EPA/PHOSPHO/AST 1000-230MG
81 CAPSULE ORAL DAILY
Qty: 90 | Refills: 0 | Status: DISCONTINUED | COMMUNITY
Start: 2020-01-01 | End: 2020-01-01

## 2020-01-01 RX ORDER — METFORMIN HYDROCHLORIDE 850 MG/1
1 TABLET ORAL
Qty: 60 | Refills: 0
Start: 2020-01-01 | End: 2020-01-01

## 2020-01-01 RX ORDER — ONDANSETRON 8 MG/1
4 TABLET, FILM COATED ORAL ONCE
Refills: 0 | Status: COMPLETED | OUTPATIENT
Start: 2020-01-01 | End: 2020-01-01

## 2020-01-01 RX ORDER — INSULIN LISPRO 100/ML
4 VIAL (ML) SUBCUTANEOUS
Qty: 1 | Refills: 0
Start: 2020-01-01 | End: 2020-01-01

## 2020-01-01 RX ORDER — CEFTRIAXONE 500 MG/1
1000 INJECTION, POWDER, FOR SOLUTION INTRAMUSCULAR; INTRAVENOUS EVERY 24 HOURS
Refills: 0 | Status: DISCONTINUED | OUTPATIENT
Start: 2020-01-01 | End: 2020-01-01

## 2020-01-01 RX ADMIN — Medication 5: at 06:24

## 2020-01-01 RX ADMIN — AZITHROMYCIN 255 MILLIGRAM(S): 500 TABLET, FILM COATED ORAL at 14:02

## 2020-01-01 RX ADMIN — ATORVASTATIN CALCIUM 40 MILLIGRAM(S): 80 TABLET, FILM COATED ORAL at 21:19

## 2020-01-01 RX ADMIN — BENZOCAINE AND MENTHOL 1 LOZENGE: 5; 1 LIQUID ORAL at 03:56

## 2020-01-01 RX ADMIN — Medication 1: at 17:23

## 2020-01-01 RX ADMIN — AMLODIPINE BESYLATE 5 MILLIGRAM(S): 2.5 TABLET ORAL at 05:26

## 2020-01-01 RX ADMIN — APIXABAN 5 MILLIGRAM(S): 2.5 TABLET, FILM COATED ORAL at 17:33

## 2020-01-01 RX ADMIN — NYSTATIN CREAM 1 APPLICATION(S): 100000 CREAM TOPICAL at 05:45

## 2020-01-01 RX ADMIN — APIXABAN 5 MILLIGRAM(S): 2.5 TABLET, FILM COATED ORAL at 05:26

## 2020-01-01 RX ADMIN — FAMOTIDINE 20 MILLIGRAM(S): 10 INJECTION INTRAVENOUS at 13:17

## 2020-01-01 RX ADMIN — Medication 5 UNIT(S): at 08:31

## 2020-01-01 RX ADMIN — Medication 650 MILLIGRAM(S): at 11:51

## 2020-01-01 RX ADMIN — AZITHROMYCIN 255 MILLIGRAM(S): 500 TABLET, FILM COATED ORAL at 14:13

## 2020-01-01 RX ADMIN — BENZOCAINE AND MENTHOL 1 LOZENGE: 5; 1 LIQUID ORAL at 06:23

## 2020-01-01 RX ADMIN — Medication 81 MILLIGRAM(S): at 16:14

## 2020-01-01 RX ADMIN — Medication 3 UNIT(S): at 08:04

## 2020-01-01 RX ADMIN — BENZOCAINE AND MENTHOL 1 LOZENGE: 5; 1 LIQUID ORAL at 21:30

## 2020-01-01 RX ADMIN — Medication 25 MILLIGRAM(S): at 05:50

## 2020-01-01 RX ADMIN — Medication 12.5 MILLIGRAM(S): at 17:48

## 2020-01-01 RX ADMIN — Medication 5 UNIT(S): at 17:34

## 2020-01-01 RX ADMIN — Medication 1: at 16:56

## 2020-01-01 RX ADMIN — Medication 1: at 11:51

## 2020-01-01 RX ADMIN — DONEPEZIL HYDROCHLORIDE 10 MILLIGRAM(S): 10 TABLET, FILM COATED ORAL at 22:40

## 2020-01-01 RX ADMIN — Medication 81 MILLIGRAM(S): at 11:20

## 2020-01-01 RX ADMIN — Medication 2: at 08:08

## 2020-01-01 RX ADMIN — ATORVASTATIN CALCIUM 40 MILLIGRAM(S): 80 TABLET, FILM COATED ORAL at 22:21

## 2020-01-01 RX ADMIN — Medication 12.5 MILLIGRAM(S): at 17:33

## 2020-01-01 RX ADMIN — Medication 25 MILLIGRAM(S): at 05:29

## 2020-01-01 RX ADMIN — Medication 5 UNIT(S): at 17:31

## 2020-01-01 RX ADMIN — LOSARTAN POTASSIUM 25 MILLIGRAM(S): 100 TABLET, FILM COATED ORAL at 18:06

## 2020-01-01 RX ADMIN — CLOPIDOGREL BISULFATE 75 MILLIGRAM(S): 75 TABLET, FILM COATED ORAL at 11:20

## 2020-01-01 RX ADMIN — Medication 3: at 23:03

## 2020-01-01 RX ADMIN — CEFTRIAXONE 100 MILLIGRAM(S): 500 INJECTION, POWDER, FOR SOLUTION INTRAMUSCULAR; INTRAVENOUS at 14:39

## 2020-01-01 RX ADMIN — Medication 40 MILLIEQUIVALENT(S): at 06:24

## 2020-01-01 RX ADMIN — Medication 81 MILLIGRAM(S): at 12:12

## 2020-01-01 RX ADMIN — CEFTRIAXONE 100 MILLIGRAM(S): 500 INJECTION, POWDER, FOR SOLUTION INTRAMUSCULAR; INTRAVENOUS at 13:48

## 2020-01-01 RX ADMIN — BENZOCAINE AND MENTHOL 1 LOZENGE: 5; 1 LIQUID ORAL at 14:58

## 2020-01-01 RX ADMIN — Medication 40 MILLIGRAM(S): at 06:46

## 2020-01-01 RX ADMIN — APIXABAN 5 MILLIGRAM(S): 2.5 TABLET, FILM COATED ORAL at 20:58

## 2020-01-01 RX ADMIN — Medication 3 UNIT(S): at 08:25

## 2020-01-01 RX ADMIN — LOSARTAN POTASSIUM 50 MILLIGRAM(S): 100 TABLET, FILM COATED ORAL at 06:24

## 2020-01-01 RX ADMIN — INSULIN GLARGINE 7 UNIT(S): 100 INJECTION, SOLUTION SUBCUTANEOUS at 22:00

## 2020-01-01 RX ADMIN — Medication 650 MILLIGRAM(S): at 21:31

## 2020-01-01 RX ADMIN — Medication 5 MILLIGRAM(S): at 21:37

## 2020-01-01 RX ADMIN — Medication 100 MILLIGRAM(S): at 05:19

## 2020-01-01 RX ADMIN — CLOPIDOGREL BISULFATE 75 MILLIGRAM(S): 75 TABLET, FILM COATED ORAL at 12:31

## 2020-01-01 RX ADMIN — Medication 20 MILLIEQUIVALENT(S): at 17:33

## 2020-01-01 RX ADMIN — Medication 1: at 17:30

## 2020-01-01 RX ADMIN — Medication 1 TABLET(S): at 16:13

## 2020-01-01 RX ADMIN — Medication 40 MILLIEQUIVALENT(S): at 08:47

## 2020-01-01 RX ADMIN — Medication 8 UNIT(S): at 17:41

## 2020-01-01 RX ADMIN — Medication 40 MILLIEQUIVALENT(S): at 00:26

## 2020-01-01 RX ADMIN — Medication 1: at 08:27

## 2020-01-01 RX ADMIN — NYSTATIN CREAM 1 APPLICATION(S): 100000 CREAM TOPICAL at 06:29

## 2020-01-01 RX ADMIN — Medication 1: at 12:31

## 2020-01-01 RX ADMIN — Medication 25 MILLIGRAM(S): at 17:40

## 2020-01-01 RX ADMIN — Medication 650 MILLIGRAM(S): at 12:19

## 2020-01-01 RX ADMIN — ATORVASTATIN CALCIUM 40 MILLIGRAM(S): 80 TABLET, FILM COATED ORAL at 21:45

## 2020-01-01 RX ADMIN — Medication 81 MILLIGRAM(S): at 12:02

## 2020-01-01 RX ADMIN — Medication 40 MILLIGRAM(S): at 05:26

## 2020-01-01 RX ADMIN — Medication 1 TABLET(S): at 14:30

## 2020-01-01 RX ADMIN — Medication 6: at 11:03

## 2020-01-01 RX ADMIN — LIDOCAINE 1 APPLICATION(S): 4 CREAM TOPICAL at 21:31

## 2020-01-01 RX ADMIN — LIDOCAINE 1 APPLICATION(S): 4 CREAM TOPICAL at 14:40

## 2020-01-01 RX ADMIN — Medication 4: at 11:30

## 2020-01-01 RX ADMIN — Medication 40 MILLIEQUIVALENT(S): at 17:14

## 2020-01-01 RX ADMIN — BENZOCAINE AND MENTHOL 1 LOZENGE: 5; 1 LIQUID ORAL at 06:24

## 2020-01-01 RX ADMIN — LOSARTAN POTASSIUM 50 MILLIGRAM(S): 100 TABLET, FILM COATED ORAL at 05:18

## 2020-01-01 RX ADMIN — LIDOCAINE 1 APPLICATION(S): 4 CREAM TOPICAL at 05:27

## 2020-01-01 RX ADMIN — Medication 1: at 17:17

## 2020-01-01 RX ADMIN — Medication 40 MILLIGRAM(S): at 00:35

## 2020-01-01 RX ADMIN — Medication 81 MILLIGRAM(S): at 12:41

## 2020-01-01 RX ADMIN — Medication 12.5 MILLIGRAM(S): at 19:32

## 2020-01-01 RX ADMIN — Medication 25 MILLIGRAM(S): at 08:20

## 2020-01-01 RX ADMIN — Medication 3 UNIT(S): at 12:00

## 2020-01-01 RX ADMIN — Medication 2: at 08:05

## 2020-01-01 RX ADMIN — LOSARTAN POTASSIUM 25 MILLIGRAM(S): 100 TABLET, FILM COATED ORAL at 17:24

## 2020-01-01 RX ADMIN — Medication 1000 MILLIGRAM(S): at 06:20

## 2020-01-01 RX ADMIN — BENZOCAINE AND MENTHOL 1 LOZENGE: 5; 1 LIQUID ORAL at 17:34

## 2020-01-01 RX ADMIN — ATORVASTATIN CALCIUM 40 MILLIGRAM(S): 80 TABLET, FILM COATED ORAL at 21:44

## 2020-01-01 RX ADMIN — Medication 1: at 09:04

## 2020-01-01 RX ADMIN — APIXABAN 5 MILLIGRAM(S): 2.5 TABLET, FILM COATED ORAL at 17:32

## 2020-01-01 RX ADMIN — Medication 25 MILLIGRAM(S): at 19:08

## 2020-01-01 RX ADMIN — APIXABAN 5 MILLIGRAM(S): 2.5 TABLET, FILM COATED ORAL at 06:46

## 2020-01-01 RX ADMIN — BENZOCAINE AND MENTHOL 1 LOZENGE: 5; 1 LIQUID ORAL at 17:48

## 2020-01-01 RX ADMIN — Medication 12.5 MILLIGRAM(S): at 05:26

## 2020-01-01 RX ADMIN — Medication 3: at 12:08

## 2020-01-01 RX ADMIN — CEFTRIAXONE 100 MILLIGRAM(S): 500 INJECTION, POWDER, FOR SOLUTION INTRAMUSCULAR; INTRAVENOUS at 14:59

## 2020-01-01 RX ADMIN — Medication 1: at 16:22

## 2020-01-01 RX ADMIN — PREGABALIN 1000 MICROGRAM(S): 225 CAPSULE ORAL at 12:02

## 2020-01-01 RX ADMIN — APIXABAN 5 MILLIGRAM(S): 2.5 TABLET, FILM COATED ORAL at 17:13

## 2020-01-01 RX ADMIN — BENZOCAINE AND MENTHOL 1 LOZENGE: 5; 1 LIQUID ORAL at 13:48

## 2020-01-01 RX ADMIN — HALOPERIDOL DECANOATE 5 MILLIGRAM(S): 100 INJECTION INTRAMUSCULAR at 16:28

## 2020-01-01 RX ADMIN — Medication 5: at 12:49

## 2020-01-01 RX ADMIN — APIXABAN 5 MILLIGRAM(S): 2.5 TABLET, FILM COATED ORAL at 06:24

## 2020-01-01 RX ADMIN — Medication 2: at 07:58

## 2020-01-01 RX ADMIN — AZITHROMYCIN 255 MILLIGRAM(S): 500 TABLET, FILM COATED ORAL at 12:53

## 2020-01-01 RX ADMIN — Medication 2: at 08:01

## 2020-01-01 RX ADMIN — Medication 400 MILLIGRAM(S): at 06:30

## 2020-01-01 RX ADMIN — BENZOCAINE AND MENTHOL 1 LOZENGE: 5; 1 LIQUID ORAL at 14:02

## 2020-01-01 RX ADMIN — Medication 100 MILLIEQUIVALENT(S): at 12:54

## 2020-01-01 RX ADMIN — Medication 5 UNIT(S): at 17:33

## 2020-01-01 RX ADMIN — DONEPEZIL HYDROCHLORIDE 10 MILLIGRAM(S): 10 TABLET, FILM COATED ORAL at 21:13

## 2020-01-01 RX ADMIN — Medication 650 MILLIGRAM(S): at 10:45

## 2020-01-01 RX ADMIN — Medication 1: at 17:34

## 2020-01-01 RX ADMIN — Medication 1: at 12:12

## 2020-01-01 RX ADMIN — Medication 40 MILLIEQUIVALENT(S): at 17:32

## 2020-01-01 RX ADMIN — Medication 81 MILLIGRAM(S): at 12:01

## 2020-01-01 RX ADMIN — ENOXAPARIN SODIUM 40 MILLIGRAM(S): 100 INJECTION SUBCUTANEOUS at 12:41

## 2020-01-01 RX ADMIN — Medication 3 MILLIGRAM(S): at 22:40

## 2020-01-01 RX ADMIN — Medication 3 MILLILITER(S): at 22:20

## 2020-01-01 RX ADMIN — DONEPEZIL HYDROCHLORIDE 10 MILLIGRAM(S): 10 TABLET, FILM COATED ORAL at 22:53

## 2020-01-01 RX ADMIN — Medication 400 MILLIGRAM(S): at 06:26

## 2020-01-01 RX ADMIN — Medication 40 MILLIEQUIVALENT(S): at 03:56

## 2020-01-01 RX ADMIN — ATORVASTATIN CALCIUM 40 MILLIGRAM(S): 80 TABLET, FILM COATED ORAL at 21:28

## 2020-01-01 RX ADMIN — CEFTRIAXONE 100 MILLIGRAM(S): 500 INJECTION, POWDER, FOR SOLUTION INTRAMUSCULAR; INTRAVENOUS at 12:53

## 2020-01-01 RX ADMIN — LOSARTAN POTASSIUM 25 MILLIGRAM(S): 100 TABLET, FILM COATED ORAL at 05:29

## 2020-01-01 RX ADMIN — BENZOCAINE AND MENTHOL 1 LOZENGE: 5; 1 LIQUID ORAL at 21:49

## 2020-01-01 RX ADMIN — BENZOCAINE AND MENTHOL 1 LOZENGE: 5; 1 LIQUID ORAL at 11:01

## 2020-01-01 RX ADMIN — Medication 40 MILLIEQUIVALENT(S): at 17:56

## 2020-01-01 RX ADMIN — Medication 25 MILLIGRAM(S): at 11:39

## 2020-01-01 RX ADMIN — Medication 8 UNIT(S): at 08:22

## 2020-01-01 RX ADMIN — TRAMADOL HYDROCHLORIDE 50 MILLIGRAM(S): 50 TABLET ORAL at 22:29

## 2020-01-01 RX ADMIN — ENOXAPARIN SODIUM 40 MILLIGRAM(S): 100 INJECTION SUBCUTANEOUS at 12:02

## 2020-01-01 RX ADMIN — Medication 1 TABLET(S): at 21:28

## 2020-01-01 RX ADMIN — BENZOCAINE AND MENTHOL 1 LOZENGE: 5; 1 LIQUID ORAL at 11:59

## 2020-01-01 RX ADMIN — LOSARTAN POTASSIUM 25 MILLIGRAM(S): 100 TABLET, FILM COATED ORAL at 17:31

## 2020-01-01 RX ADMIN — Medication 3: at 12:00

## 2020-01-01 RX ADMIN — DONEPEZIL HYDROCHLORIDE 10 MILLIGRAM(S): 10 TABLET, FILM COATED ORAL at 21:28

## 2020-01-01 RX ADMIN — Medication 81 MILLIGRAM(S): at 12:22

## 2020-01-01 RX ADMIN — Medication 25 MILLIGRAM(S): at 12:32

## 2020-01-01 RX ADMIN — Medication 40 MILLIEQUIVALENT(S): at 14:30

## 2020-01-01 RX ADMIN — Medication 2: at 12:40

## 2020-01-01 RX ADMIN — DONEPEZIL HYDROCHLORIDE 10 MILLIGRAM(S): 10 TABLET, FILM COATED ORAL at 21:41

## 2020-01-01 RX ADMIN — Medication 650 MILLIGRAM(S): at 03:45

## 2020-01-01 RX ADMIN — Medication 650 MILLIGRAM(S): at 10:15

## 2020-01-01 RX ADMIN — LIDOCAINE 1 APPLICATION(S): 4 CREAM TOPICAL at 06:11

## 2020-01-01 RX ADMIN — BENZOCAINE AND MENTHOL 1 LOZENGE: 5; 1 LIQUID ORAL at 03:10

## 2020-01-01 RX ADMIN — Medication 3 UNIT(S): at 11:29

## 2020-01-01 RX ADMIN — Medication 650 MILLIGRAM(S): at 03:14

## 2020-01-01 RX ADMIN — ENOXAPARIN SODIUM 40 MILLIGRAM(S): 100 INJECTION SUBCUTANEOUS at 11:04

## 2020-01-01 RX ADMIN — Medication 12.5 MILLIGRAM(S): at 06:29

## 2020-01-01 RX ADMIN — ATORVASTATIN CALCIUM 40 MILLIGRAM(S): 80 TABLET, FILM COATED ORAL at 22:39

## 2020-01-01 RX ADMIN — PREGABALIN 1000 MICROGRAM(S): 225 CAPSULE ORAL at 11:03

## 2020-01-01 RX ADMIN — ENOXAPARIN SODIUM 40 MILLIGRAM(S): 100 INJECTION SUBCUTANEOUS at 16:14

## 2020-01-01 RX ADMIN — CEFTRIAXONE 100 MILLIGRAM(S): 500 INJECTION, POWDER, FOR SOLUTION INTRAMUSCULAR; INTRAVENOUS at 22:51

## 2020-01-01 RX ADMIN — BENZOCAINE AND MENTHOL 1 LOZENGE: 5; 1 LIQUID ORAL at 17:33

## 2020-01-01 RX ADMIN — Medication 650 MILLIGRAM(S): at 16:19

## 2020-01-01 RX ADMIN — BENZOCAINE AND MENTHOL 1 LOZENGE: 5; 1 LIQUID ORAL at 13:49

## 2020-01-01 RX ADMIN — Medication 8 UNIT(S): at 17:28

## 2020-01-01 RX ADMIN — INSULIN GLARGINE 30 UNIT(S): 100 INJECTION, SOLUTION SUBCUTANEOUS at 08:00

## 2020-01-01 RX ADMIN — APIXABAN 5 MILLIGRAM(S): 2.5 TABLET, FILM COATED ORAL at 17:38

## 2020-01-01 RX ADMIN — Medication 20 MILLIGRAM(S): at 06:25

## 2020-01-01 RX ADMIN — Medication 12.5 MILLIGRAM(S): at 17:38

## 2020-01-01 RX ADMIN — INSULIN GLARGINE 15 UNIT(S): 100 INJECTION, SOLUTION SUBCUTANEOUS at 22:53

## 2020-01-01 RX ADMIN — Medication 3: at 08:41

## 2020-01-01 RX ADMIN — INSULIN GLARGINE 30 UNIT(S): 100 INJECTION, SOLUTION SUBCUTANEOUS at 08:06

## 2020-01-01 RX ADMIN — Medication 81 MILLIGRAM(S): at 12:50

## 2020-01-01 RX ADMIN — ATORVASTATIN CALCIUM 40 MILLIGRAM(S): 80 TABLET, FILM COATED ORAL at 22:09

## 2020-01-01 RX ADMIN — Medication 650 MILLIGRAM(S): at 21:34

## 2020-01-01 RX ADMIN — ATORVASTATIN CALCIUM 40 MILLIGRAM(S): 80 TABLET, FILM COATED ORAL at 22:50

## 2020-01-01 RX ADMIN — Medication 40 MILLIGRAM(S): at 06:24

## 2020-01-01 RX ADMIN — ATORVASTATIN CALCIUM 40 MILLIGRAM(S): 80 TABLET, FILM COATED ORAL at 21:37

## 2020-01-01 RX ADMIN — BENZOCAINE AND MENTHOL 1 LOZENGE: 5; 1 LIQUID ORAL at 11:29

## 2020-01-01 RX ADMIN — Medication 650 MILLIGRAM(S): at 21:38

## 2020-01-01 RX ADMIN — DONEPEZIL HYDROCHLORIDE 10 MILLIGRAM(S): 10 TABLET, FILM COATED ORAL at 21:31

## 2020-01-01 RX ADMIN — HYDROMORPHONE HYDROCHLORIDE 0.5 MILLIGRAM(S): 2 INJECTION INTRAMUSCULAR; INTRAVENOUS; SUBCUTANEOUS at 02:27

## 2020-01-01 RX ADMIN — BENZOCAINE AND MENTHOL 1 LOZENGE: 5; 1 LIQUID ORAL at 17:35

## 2020-01-01 RX ADMIN — PREGABALIN 1000 MICROGRAM(S): 225 CAPSULE ORAL at 13:17

## 2020-01-01 RX ADMIN — BENZOCAINE AND MENTHOL 1 LOZENGE: 5; 1 LIQUID ORAL at 06:46

## 2020-01-01 RX ADMIN — Medication 81 MILLIGRAM(S): at 12:55

## 2020-01-01 RX ADMIN — AMLODIPINE BESYLATE 5 MILLIGRAM(S): 2.5 TABLET ORAL at 05:30

## 2020-01-01 RX ADMIN — Medication 25 MILLIGRAM(S): at 18:21

## 2020-01-01 RX ADMIN — Medication 12.5 MILLIGRAM(S): at 05:41

## 2020-01-01 RX ADMIN — APIXABAN 2.5 MILLIGRAM(S): 2.5 TABLET, FILM COATED ORAL at 06:24

## 2020-01-01 RX ADMIN — OLANZAPINE 2.5 MILLIGRAM(S): 15 TABLET, FILM COATED ORAL at 16:59

## 2020-01-01 RX ADMIN — Medication 1: at 08:15

## 2020-01-01 RX ADMIN — BENZOCAINE AND MENTHOL 1 LOZENGE: 5; 1 LIQUID ORAL at 14:24

## 2020-01-01 RX ADMIN — Medication 3 UNIT(S): at 17:47

## 2020-01-01 RX ADMIN — AZITHROMYCIN 255 MILLIGRAM(S): 500 TABLET, FILM COATED ORAL at 13:49

## 2020-01-01 RX ADMIN — ENOXAPARIN SODIUM 40 MILLIGRAM(S): 100 INJECTION SUBCUTANEOUS at 12:12

## 2020-01-01 RX ADMIN — Medication 81 MILLIGRAM(S): at 13:50

## 2020-01-01 RX ADMIN — ENOXAPARIN SODIUM 40 MILLIGRAM(S): 100 INJECTION SUBCUTANEOUS at 13:17

## 2020-01-01 RX ADMIN — AZITHROMYCIN 255 MILLIGRAM(S): 500 TABLET, FILM COATED ORAL at 14:39

## 2020-01-01 RX ADMIN — Medication 2: at 08:00

## 2020-01-01 RX ADMIN — INSULIN GLARGINE 30 UNIT(S): 100 INJECTION, SOLUTION SUBCUTANEOUS at 11:33

## 2020-01-01 RX ADMIN — Medication 81 MILLIGRAM(S): at 11:40

## 2020-01-01 RX ADMIN — AZITHROMYCIN 255 MILLIGRAM(S): 500 TABLET, FILM COATED ORAL at 16:43

## 2020-01-01 RX ADMIN — Medication 25 MILLIGRAM(S): at 17:31

## 2020-01-01 RX ADMIN — Medication 4: at 08:31

## 2020-01-01 RX ADMIN — APIXABAN 5 MILLIGRAM(S): 2.5 TABLET, FILM COATED ORAL at 05:45

## 2020-01-01 RX ADMIN — Medication 81 MILLIGRAM(S): at 12:26

## 2020-01-01 RX ADMIN — Medication 650 MILLIGRAM(S): at 12:51

## 2020-01-01 RX ADMIN — LOSARTAN POTASSIUM 25 MILLIGRAM(S): 100 TABLET, FILM COATED ORAL at 19:07

## 2020-01-01 RX ADMIN — Medication 650 MILLIGRAM(S): at 06:51

## 2020-01-01 RX ADMIN — Medication 40 MILLIGRAM(S): at 15:17

## 2020-01-01 RX ADMIN — BENZOCAINE AND MENTHOL 1 LOZENGE: 5; 1 LIQUID ORAL at 14:30

## 2020-01-01 RX ADMIN — Medication 4: at 17:25

## 2020-01-01 RX ADMIN — BENZOCAINE AND MENTHOL 1 LOZENGE: 5; 1 LIQUID ORAL at 17:26

## 2020-01-01 RX ADMIN — BENZOCAINE AND MENTHOL 1 LOZENGE: 5; 1 LIQUID ORAL at 05:19

## 2020-01-01 RX ADMIN — ATORVASTATIN CALCIUM 40 MILLIGRAM(S): 80 TABLET, FILM COATED ORAL at 21:49

## 2020-01-01 RX ADMIN — Medication 3 UNIT(S): at 17:24

## 2020-01-01 RX ADMIN — INSULIN GLARGINE 15 UNIT(S): 100 INJECTION, SOLUTION SUBCUTANEOUS at 23:03

## 2020-01-01 RX ADMIN — LOSARTAN POTASSIUM 25 MILLIGRAM(S): 100 TABLET, FILM COATED ORAL at 17:13

## 2020-01-01 RX ADMIN — LOSARTAN POTASSIUM 50 MILLIGRAM(S): 100 TABLET, FILM COATED ORAL at 05:41

## 2020-01-01 RX ADMIN — FAMOTIDINE 20 MILLIGRAM(S): 10 INJECTION INTRAVENOUS at 22:15

## 2020-01-01 RX ADMIN — APIXABAN 5 MILLIGRAM(S): 2.5 TABLET, FILM COATED ORAL at 06:29

## 2020-01-01 RX ADMIN — DONEPEZIL HYDROCHLORIDE 10 MILLIGRAM(S): 10 TABLET, FILM COATED ORAL at 21:45

## 2020-01-01 RX ADMIN — Medication 12.5 MILLIGRAM(S): at 17:32

## 2020-01-01 RX ADMIN — Medication 3 UNIT(S): at 17:30

## 2020-01-01 RX ADMIN — Medication 5 UNIT(S): at 08:20

## 2020-01-01 RX ADMIN — Medication 4: at 13:48

## 2020-01-01 RX ADMIN — Medication 650 MILLIGRAM(S): at 07:50

## 2020-01-01 RX ADMIN — SODIUM CHLORIDE 1000 MILLILITER(S): 9 INJECTION INTRAMUSCULAR; INTRAVENOUS; SUBCUTANEOUS at 13:17

## 2020-01-01 RX ADMIN — Medication 20 MILLIGRAM(S): at 06:30

## 2020-01-01 RX ADMIN — BENZOCAINE AND MENTHOL 1 LOZENGE: 5; 1 LIQUID ORAL at 17:39

## 2020-01-01 RX ADMIN — AMLODIPINE BESYLATE 5 MILLIGRAM(S): 2.5 TABLET ORAL at 05:42

## 2020-01-01 RX ADMIN — INSULIN GLARGINE 30 UNIT(S): 100 INJECTION, SOLUTION SUBCUTANEOUS at 09:30

## 2020-01-01 RX ADMIN — ENOXAPARIN SODIUM 40 MILLIGRAM(S): 100 INJECTION SUBCUTANEOUS at 11:40

## 2020-01-01 RX ADMIN — BENZOCAINE AND MENTHOL 1 LOZENGE: 5; 1 LIQUID ORAL at 14:14

## 2020-01-01 RX ADMIN — CEFTRIAXONE 100 MILLIGRAM(S): 500 INJECTION, POWDER, FOR SOLUTION INTRAMUSCULAR; INTRAVENOUS at 14:13

## 2020-01-01 RX ADMIN — INSULIN GLARGINE 9 UNIT(S): 100 INJECTION, SOLUTION SUBCUTANEOUS at 22:13

## 2020-01-01 RX ADMIN — Medication 1 TABLET(S): at 22:21

## 2020-01-01 RX ADMIN — LOSARTAN POTASSIUM 50 MILLIGRAM(S): 100 TABLET, FILM COATED ORAL at 06:46

## 2020-01-01 RX ADMIN — HYDROMORPHONE HYDROCHLORIDE 0.5 MILLIGRAM(S): 2 INJECTION INTRAMUSCULAR; INTRAVENOUS; SUBCUTANEOUS at 23:00

## 2020-01-01 RX ADMIN — Medication 3: at 12:01

## 2020-01-01 RX ADMIN — Medication 5 UNIT(S): at 17:02

## 2020-01-01 RX ADMIN — Medication 3: at 08:24

## 2020-01-01 RX ADMIN — Medication 4: at 17:33

## 2020-01-01 RX ADMIN — LOSARTAN POTASSIUM 25 MILLIGRAM(S): 100 TABLET, FILM COATED ORAL at 13:17

## 2020-01-01 RX ADMIN — NYSTATIN CREAM 1 APPLICATION(S): 100000 CREAM TOPICAL at 13:16

## 2020-01-01 RX ADMIN — AMLODIPINE BESYLATE 5 MILLIGRAM(S): 2.5 TABLET ORAL at 05:36

## 2020-01-01 RX ADMIN — CEFTRIAXONE 100 MILLIGRAM(S): 500 INJECTION, POWDER, FOR SOLUTION INTRAMUSCULAR; INTRAVENOUS at 14:03

## 2020-01-01 RX ADMIN — Medication 25 MILLIGRAM(S): at 18:06

## 2020-01-01 RX ADMIN — ATORVASTATIN CALCIUM 40 MILLIGRAM(S): 80 TABLET, FILM COATED ORAL at 21:41

## 2020-01-01 RX ADMIN — Medication 81 MILLIGRAM(S): at 12:32

## 2020-01-01 RX ADMIN — NYSTATIN CREAM 1 APPLICATION(S): 100000 CREAM TOPICAL at 06:47

## 2020-01-01 RX ADMIN — Medication 40 MILLIEQUIVALENT(S): at 13:12

## 2020-01-01 RX ADMIN — Medication 40 MILLIEQUIVALENT(S): at 22:50

## 2020-01-01 RX ADMIN — Medication 8 UNIT(S): at 07:59

## 2020-01-01 RX ADMIN — Medication 12.5 MILLIGRAM(S): at 05:18

## 2020-01-01 RX ADMIN — AMLODIPINE BESYLATE 5 MILLIGRAM(S): 2.5 TABLET ORAL at 06:25

## 2020-01-01 RX ADMIN — Medication 12.5 MILLIGRAM(S): at 06:25

## 2020-01-01 RX ADMIN — ATORVASTATIN CALCIUM 40 MILLIGRAM(S): 80 TABLET, FILM COATED ORAL at 21:31

## 2020-01-01 RX ADMIN — Medication 81 MILLIGRAM(S): at 11:59

## 2020-01-01 RX ADMIN — Medication 1: at 17:14

## 2020-01-01 RX ADMIN — DONEPEZIL HYDROCHLORIDE 10 MILLIGRAM(S): 10 TABLET, FILM COATED ORAL at 21:19

## 2020-01-01 RX ADMIN — Medication 5 UNIT(S): at 12:21

## 2020-01-01 RX ADMIN — Medication 1: at 12:24

## 2020-01-01 RX ADMIN — Medication 81 MILLIGRAM(S): at 11:39

## 2020-01-01 RX ADMIN — Medication 1: at 12:22

## 2020-01-01 RX ADMIN — INSULIN GLARGINE 30 UNIT(S): 100 INJECTION, SOLUTION SUBCUTANEOUS at 11:01

## 2020-01-01 RX ADMIN — BENZOCAINE AND MENTHOL 1 LOZENGE: 5; 1 LIQUID ORAL at 22:50

## 2020-01-01 RX ADMIN — BENZOCAINE AND MENTHOL 1 LOZENGE: 5; 1 LIQUID ORAL at 17:38

## 2020-01-01 RX ADMIN — Medication 12.5 MILLIGRAM(S): at 17:25

## 2020-01-01 RX ADMIN — Medication 6: at 08:06

## 2020-01-01 RX ADMIN — Medication 8 UNIT(S): at 11:34

## 2020-01-01 RX ADMIN — SODIUM CHLORIDE 1000 MILLILITER(S): 9 INJECTION INTRAMUSCULAR; INTRAVENOUS; SUBCUTANEOUS at 06:14

## 2020-01-01 RX ADMIN — Medication 5 UNIT(S): at 08:00

## 2020-01-01 RX ADMIN — BENZOCAINE AND MENTHOL 1 LOZENGE: 5; 1 LIQUID ORAL at 02:34

## 2020-01-01 RX ADMIN — ATORVASTATIN CALCIUM 40 MILLIGRAM(S): 80 TABLET, FILM COATED ORAL at 21:34

## 2020-01-01 RX ADMIN — LIDOCAINE 1 APPLICATION(S): 4 CREAM TOPICAL at 06:30

## 2020-01-01 RX ADMIN — NYSTATIN CREAM 1 APPLICATION(S): 100000 CREAM TOPICAL at 06:28

## 2020-01-01 RX ADMIN — BENZOCAINE AND MENTHOL 1 LOZENGE: 5; 1 LIQUID ORAL at 21:38

## 2020-01-01 RX ADMIN — Medication 1: at 08:24

## 2020-01-01 RX ADMIN — PREGABALIN 1000 MICROGRAM(S): 225 CAPSULE ORAL at 11:40

## 2020-01-01 RX ADMIN — ENOXAPARIN SODIUM 40 MILLIGRAM(S): 100 INJECTION SUBCUTANEOUS at 12:03

## 2020-01-01 RX ADMIN — LIDOCAINE 1 APPLICATION(S): 4 CREAM TOPICAL at 00:10

## 2020-01-01 RX ADMIN — AMLODIPINE BESYLATE 5 MILLIGRAM(S): 2.5 TABLET ORAL at 06:46

## 2020-01-01 RX ADMIN — Medication 8 UNIT(S): at 12:42

## 2020-01-01 RX ADMIN — Medication 5 UNIT(S): at 11:03

## 2020-01-01 RX ADMIN — DONEPEZIL HYDROCHLORIDE 10 MILLIGRAM(S): 10 TABLET, FILM COATED ORAL at 21:37

## 2020-01-01 RX ADMIN — Medication 8: at 16:50

## 2020-01-01 RX ADMIN — Medication 1 TABLET(S): at 08:09

## 2020-01-01 RX ADMIN — ATORVASTATIN CALCIUM 40 MILLIGRAM(S): 80 TABLET, FILM COATED ORAL at 21:02

## 2020-01-01 RX ADMIN — LIDOCAINE 1 APPLICATION(S): 4 CREAM TOPICAL at 14:59

## 2020-01-01 RX ADMIN — Medication 40 MILLIEQUIVALENT(S): at 17:34

## 2020-01-01 RX ADMIN — HALOPERIDOL DECANOATE 0.5 MILLIGRAM(S): 100 INJECTION INTRAMUSCULAR at 22:52

## 2020-01-01 RX ADMIN — Medication 1: at 11:59

## 2020-01-01 RX ADMIN — DONEPEZIL HYDROCHLORIDE 10 MILLIGRAM(S): 10 TABLET, FILM COATED ORAL at 21:49

## 2020-01-01 RX ADMIN — LOSARTAN POTASSIUM 25 MILLIGRAM(S): 100 TABLET, FILM COATED ORAL at 16:10

## 2020-01-01 RX ADMIN — LOSARTAN POTASSIUM 25 MILLIGRAM(S): 100 TABLET, FILM COATED ORAL at 05:50

## 2020-01-01 RX ADMIN — AZITHROMYCIN 255 MILLIGRAM(S): 500 TABLET, FILM COATED ORAL at 14:58

## 2020-01-01 RX ADMIN — Medication 3: at 08:20

## 2020-01-01 RX ADMIN — NYSTATIN CREAM 1 APPLICATION(S): 100000 CREAM TOPICAL at 17:00

## 2020-01-01 RX ADMIN — Medication 2: at 21:46

## 2020-01-01 RX ADMIN — Medication 1: at 17:41

## 2020-01-01 RX ADMIN — Medication 40 MILLIEQUIVALENT(S): at 13:42

## 2020-01-01 RX ADMIN — Medication 40 MILLIGRAM(S): at 17:32

## 2020-01-01 RX ADMIN — BENZOCAINE AND MENTHOL 1 LOZENGE: 5; 1 LIQUID ORAL at 21:45

## 2020-01-01 RX ADMIN — Medication 25 MILLIGRAM(S): at 17:13

## 2020-01-01 RX ADMIN — INSULIN GLARGINE 9 UNIT(S): 100 INJECTION, SOLUTION SUBCUTANEOUS at 21:30

## 2020-01-01 RX ADMIN — Medication 1: at 17:29

## 2020-01-01 RX ADMIN — Medication 3: at 17:48

## 2020-01-01 RX ADMIN — BENZOCAINE AND MENTHOL 1 LOZENGE: 5; 1 LIQUID ORAL at 21:32

## 2020-01-01 RX ADMIN — ERGOCALCIFEROL 50000 UNIT(S): 1.25 CAPSULE ORAL at 12:41

## 2020-01-01 RX ADMIN — Medication 8 UNIT(S): at 17:22

## 2020-01-01 RX ADMIN — NYSTATIN CREAM 1 APPLICATION(S): 100000 CREAM TOPICAL at 06:25

## 2020-01-01 RX ADMIN — LIDOCAINE 1 APPLICATION(S): 4 CREAM TOPICAL at 14:31

## 2020-01-01 RX ADMIN — LIDOCAINE 1 APPLICATION(S): 4 CREAM TOPICAL at 14:25

## 2020-01-01 RX ADMIN — HYDROMORPHONE HYDROCHLORIDE 0.5 MILLIGRAM(S): 2 INJECTION INTRAMUSCULAR; INTRAVENOUS; SUBCUTANEOUS at 22:51

## 2020-01-01 RX ADMIN — Medication 12.5 MILLIGRAM(S): at 17:34

## 2020-01-01 RX ADMIN — Medication 12.5 MILLIGRAM(S): at 06:46

## 2020-01-01 RX ADMIN — ONDANSETRON 4 MILLIGRAM(S): 8 TABLET, FILM COATED ORAL at 22:15

## 2020-01-01 RX ADMIN — Medication 81 MILLIGRAM(S): at 11:03

## 2020-01-01 RX ADMIN — ACETAZOLAMIDE 250 MILLIGRAM(S): 250 TABLET ORAL at 14:24

## 2020-01-01 RX ADMIN — PREGABALIN 1000 MICROGRAM(S): 225 CAPSULE ORAL at 12:41

## 2020-01-01 RX ADMIN — ATORVASTATIN CALCIUM 40 MILLIGRAM(S): 80 TABLET, FILM COATED ORAL at 23:05

## 2020-01-01 RX ADMIN — SODIUM CHLORIDE 2000 MILLILITER(S): 9 INJECTION INTRAMUSCULAR; INTRAVENOUS; SUBCUTANEOUS at 00:15

## 2020-01-01 RX ADMIN — APIXABAN 5 MILLIGRAM(S): 2.5 TABLET, FILM COATED ORAL at 17:49

## 2020-01-01 RX ADMIN — Medication 81 MILLIGRAM(S): at 13:17

## 2020-01-01 RX ADMIN — BENZOCAINE AND MENTHOL 1 LOZENGE: 5; 1 LIQUID ORAL at 21:29

## 2020-01-01 RX ADMIN — NYSTATIN CREAM 1 APPLICATION(S): 100000 CREAM TOPICAL at 22:50

## 2020-01-01 RX ADMIN — LOSARTAN POTASSIUM 50 MILLIGRAM(S): 100 TABLET, FILM COATED ORAL at 05:27

## 2020-01-01 RX ADMIN — Medication 6 MILLIGRAM(S): at 22:51

## 2020-01-01 RX ADMIN — Medication 650 MILLIGRAM(S): at 09:25

## 2020-01-01 RX ADMIN — ENOXAPARIN SODIUM 40 MILLIGRAM(S): 100 INJECTION SUBCUTANEOUS at 11:39

## 2020-01-01 RX ADMIN — HYDROMORPHONE HYDROCHLORIDE 0.5 MILLIGRAM(S): 2 INJECTION INTRAMUSCULAR; INTRAVENOUS; SUBCUTANEOUS at 01:33

## 2020-01-01 RX ADMIN — Medication 1: at 17:35

## 2020-01-01 RX ADMIN — BENZOCAINE AND MENTHOL 1 LOZENGE: 5; 1 LIQUID ORAL at 05:27

## 2020-01-01 RX ADMIN — INSULIN GLARGINE 9 UNIT(S): 100 INJECTION, SOLUTION SUBCUTANEOUS at 00:07

## 2020-01-01 RX ADMIN — Medication 8 UNIT(S): at 09:05

## 2020-01-01 RX ADMIN — APIXABAN 5 MILLIGRAM(S): 2.5 TABLET, FILM COATED ORAL at 17:34

## 2020-01-01 RX ADMIN — ERGOCALCIFEROL 50000 UNIT(S): 1.25 CAPSULE ORAL at 20:57

## 2020-01-01 RX ADMIN — Medication 1: at 08:22

## 2020-01-01 RX ADMIN — Medication 12.5 MILLIGRAM(S): at 20:58

## 2020-01-01 RX ADMIN — Medication 25 MILLIGRAM(S): at 17:24

## 2020-01-01 RX ADMIN — Medication 25 MILLIGRAM(S): at 05:43

## 2020-01-01 RX ADMIN — Medication 8 UNIT(S): at 08:08

## 2020-01-01 RX ADMIN — ATORVASTATIN CALCIUM 40 MILLIGRAM(S): 80 TABLET, FILM COATED ORAL at 21:58

## 2020-01-01 RX ADMIN — INSULIN GLARGINE 5 UNIT(S): 100 INJECTION, SOLUTION SUBCUTANEOUS at 21:32

## 2020-01-01 RX ADMIN — Medication 650 MILLIGRAM(S): at 13:20

## 2020-01-01 RX ADMIN — SODIUM CHLORIDE 1000 MILLILITER(S): 9 INJECTION INTRAMUSCULAR; INTRAVENOUS; SUBCUTANEOUS at 21:52

## 2020-01-01 RX ADMIN — Medication 6: at 12:15

## 2020-01-01 RX ADMIN — LIDOCAINE 1 APPLICATION(S): 4 CREAM TOPICAL at 21:29

## 2020-01-01 RX ADMIN — NYSTATIN CREAM 1 APPLICATION(S): 100000 CREAM TOPICAL at 14:24

## 2020-01-01 RX ADMIN — LOSARTAN POTASSIUM 25 MILLIGRAM(S): 100 TABLET, FILM COATED ORAL at 17:40

## 2020-01-01 RX ADMIN — APIXABAN 5 MILLIGRAM(S): 2.5 TABLET, FILM COATED ORAL at 05:18

## 2020-01-01 RX ADMIN — Medication 8 UNIT(S): at 17:34

## 2020-01-01 RX ADMIN — APIXABAN 5 MILLIGRAM(S): 2.5 TABLET, FILM COATED ORAL at 17:26

## 2020-01-01 RX ADMIN — LOSARTAN POTASSIUM 25 MILLIGRAM(S): 100 TABLET, FILM COATED ORAL at 05:43

## 2020-01-01 RX ADMIN — Medication 650 MILLIGRAM(S): at 22:20

## 2020-01-01 RX ADMIN — Medication 2: at 07:54

## 2020-01-01 RX ADMIN — APIXABAN 5 MILLIGRAM(S): 2.5 TABLET, FILM COATED ORAL at 05:42

## 2020-01-01 RX ADMIN — AZITHROMYCIN 255 MILLIGRAM(S): 500 TABLET, FILM COATED ORAL at 23:24

## 2020-01-01 RX ADMIN — Medication 8 UNIT(S): at 11:59

## 2020-01-01 RX ADMIN — Medication 40 MILLIGRAM(S): at 05:42

## 2020-01-01 RX ADMIN — Medication 5 UNIT(S): at 12:15

## 2020-01-01 RX ADMIN — Medication 25 MILLIGRAM(S): at 05:35

## 2020-01-01 RX ADMIN — Medication 3: at 13:29

## 2020-01-01 RX ADMIN — AMLODIPINE BESYLATE 5 MILLIGRAM(S): 2.5 TABLET ORAL at 05:18

## 2020-01-01 RX ADMIN — Medication 400 MILLIGRAM(S): at 05:49

## 2020-01-01 RX ADMIN — CEFTRIAXONE 100 MILLIGRAM(S): 500 INJECTION, POWDER, FOR SOLUTION INTRAMUSCULAR; INTRAVENOUS at 16:43

## 2020-01-01 RX ADMIN — BENZOCAINE AND MENTHOL 1 LOZENGE: 5; 1 LIQUID ORAL at 05:41

## 2020-01-01 RX ADMIN — APIXABAN 5 MILLIGRAM(S): 2.5 TABLET, FILM COATED ORAL at 06:35

## 2020-01-01 RX ADMIN — Medication 2: at 17:02

## 2020-01-01 RX ADMIN — Medication 1: at 12:11

## 2020-01-01 RX ADMIN — LOSARTAN POTASSIUM 25 MILLIGRAM(S): 100 TABLET, FILM COATED ORAL at 08:20

## 2020-01-01 RX ADMIN — Medication 8 UNIT(S): at 12:05

## 2020-01-01 RX ADMIN — DONEPEZIL HYDROCHLORIDE 10 MILLIGRAM(S): 10 TABLET, FILM COATED ORAL at 21:30

## 2020-01-01 RX ADMIN — Medication 40 MILLIGRAM(S): at 05:19

## 2020-01-01 RX ADMIN — ATORVASTATIN CALCIUM 40 MILLIGRAM(S): 80 TABLET, FILM COATED ORAL at 21:30

## 2020-01-01 RX ADMIN — Medication 81 MILLIGRAM(S): at 11:29

## 2020-01-01 RX ADMIN — Medication 3 MILLIGRAM(S): at 21:19

## 2020-01-01 RX ADMIN — Medication 1: at 08:26

## 2020-01-01 RX ADMIN — Medication 40 MILLIGRAM(S): at 17:40

## 2020-01-01 RX ADMIN — Medication 1: at 17:12

## 2020-01-01 RX ADMIN — BENZOCAINE AND MENTHOL 1 LOZENGE: 5; 1 LIQUID ORAL at 12:49

## 2020-01-01 RX ADMIN — ATORVASTATIN CALCIUM 40 MILLIGRAM(S): 80 TABLET, FILM COATED ORAL at 21:13

## 2020-01-01 RX ADMIN — BENZOCAINE AND MENTHOL 1 LOZENGE: 5; 1 LIQUID ORAL at 06:30

## 2020-01-01 RX ADMIN — Medication 8 UNIT(S): at 17:35

## 2020-01-01 RX ADMIN — Medication 81 MILLIGRAM(S): at 00:09

## 2020-01-01 RX ADMIN — NYSTATIN CREAM 1 APPLICATION(S): 100000 CREAM TOPICAL at 17:24

## 2020-01-01 RX ADMIN — Medication 81 MILLIGRAM(S): at 11:51

## 2020-01-01 RX ADMIN — Medication 5 UNIT(S): at 12:40

## 2020-01-01 RX ADMIN — Medication 25 MILLIGRAM(S): at 05:30

## 2020-01-01 RX ADMIN — Medication 2: at 17:31

## 2020-01-30 NOTE — ED ADULT TRIAGE NOTE - CHIEF COMPLAINT QUOTE
s/p fall at home,  no LOC, no obvious trauma, no blood thinners s/p fall at home, unable to get up, no LOC/ no obvious trauma,

## 2020-01-30 NOTE — ED ADULT NURSE NOTE - NSIMPLEMENTINTERV_GEN_ALL_ED
Implemented All Fall with Harm Risk Interventions:  [place] to call system. Call bell, personal items and telephone within reach. Instruct patient to call for assistance. Room bathroom lighting operational. Non-slip footwear when patient is off stretcher. Physically safe environment: no spills, clutter or unnecessary equipment. Stretcher in lowest position, wheels locked, appropriate side rails in place. Provide visual cue, wrist band, yellow gown, etc. Monitor gait and stability. Monitor for mental status changes and reorient to person, place, and time. Review medications for side effects contributing to fall risk. Reinforce activity limits and safety measures with patient and family. Provide visual clues: red socks.

## 2020-01-30 NOTE — ED PROVIDER NOTE - CLINICAL SUMMARY MEDICAL DECISION MAKING FREE TEXT BOX
Patient presenting s/p fall, unclear history, does not recall event leading to fall. will obtain lab, ekg, cxr, ct head. ed obs and reassess

## 2020-01-30 NOTE — ED ADULT NURSE NOTE - OBJECTIVE STATEMENT
as per daughter pt was found as per daughter pt was found on the floor this am, couldn't get up off the floor.Pt is confused

## 2020-01-30 NOTE — ED PROVIDER NOTE - OBJECTIVE STATEMENT
79 y.o presenting s/p fall. per ems, patient found on ground, unable to get up. patient denies pmh, endorses she is unsure how she end up on the ground or at what time. denies headache, n, v, cp, sob. endorses knee pain.

## 2020-01-30 NOTE — ED PROVIDER NOTE - PROGRESS NOTE DETAILS
patient daughter bedside, endorses that she slid out of her chair today when trying to get up, unclear if she had loc, been having weakness and difficulty over the past few days. had similar in the when she had pna

## 2020-01-31 NOTE — CONSULT NOTE ADULT - SUBJECTIVE AND OBJECTIVE BOX
CHIEF COMPLAINT:Patient is a 79y old  Female who presents with a chief complaint of syncope (31 Jan 2020 09:52)      HPI:  79 years old female from home, lives with her , with PMHx of HTN, DM, hyperlipidemia presenting s/p fall per ems, patient found on ground, unable to get up. Patient endorses she is unsure how she end up on the ground or at what time it happened. Patient was confused after the episode per .   Patient denies headache, nausea, vomiting, chest pain, shortness of breath. Patient also endorses knee pain on left side.  Patient is admitted for syncope. EKG is negative for ischemia. Orthostatic vitals is negative. Patient is being worked up for syncope. (31 Jan 2020 00:30)      PAST MEDICAL & SURGICAL HISTORY:      MEDICATIONS  (STANDING):  amLODIPine   Tablet 5 milliGRAM(s) Oral daily  aspirin  chewable 81 milliGRAM(s) Oral daily  enoxaparin Injectable 40 milliGRAM(s) SubCutaneous daily  insulin lispro (HumaLOG) corrective regimen sliding scale   SubCutaneous Before meals and at bedtime  metoprolol tartrate 25 milliGRAM(s) Oral two times a day  potassium acid phosphate/sodium acid phosphate tablet (K-PHOS No. 2) 1 Tablet(s) Oral four times a day with meals  potassium chloride    Tablet ER 40 milliEquivalent(s) Oral once  simvastatin 20 milliGRAM(s) Oral at bedtime    MEDICATIONS  (PRN):      FAMILY HISTORY:No hx of CAD      SOCIAL HISTORY:    [ x] Non-smoker    [x ] Alcohol-denies    Allergies    penicillin (Unknown)    Intolerances    	    REVIEW OF SYSTEMS:  CONSTITUTIONAL: No fever, weight loss, or fatigue  EYES: No eye pain, visual disturbances, or discharge  ENT:  No difficulty hearing, tinnitus, vertigo; No sinus or throat pain  NECK: No pain or stiffness  RESPIRATORY: No cough, wheezing, chills or hemoptysis; No Shortness of Breath  CARDIOVASCULAR: No chest pain, palpitations, +passing out, No dizziness, or leg swelling  GASTROINTESTINAL: No abdominal or epigastric pain. No nausea, vomiting, or hematemesis; No diarrhea or constipation. No melena or hematochezia.  GENITOURINARY: No dysuria, frequency, hematuria, or incontinence  NEUROLOGICAL: No headaches, memory loss, loss of strength, numbness, or tremors  SKIN: No itching, burning, rashes, or lesions   LYMPH Nodes: No enlarged glands  ENDOCRINE: No heat or cold intolerance; No hair loss  MUSCULOSKELETAL: No joint pain or swelling; No muscle, back, or extremity pain  PSYCHIATRIC: No depression, anxiety, mood swings, or difficulty sleeping  HEME/LYMPH: No easy bruising, or bleeding gums  ALLERGY AND IMMUNOLOGIC: No hives or eczema	      PHYSICAL EXAM:  T(C): 36.6 (01-31-20 @ 05:16), Max: 37.6 (01-30-20 @ 13:45)  HR: 81 (01-31-20 @ 03:27) (81 - 93)  BP: 179/73 (01-31-20 @ 03:27) (134/41 - 179/73)  RR: 18 (01-31-20 @ 05:16) (16 - 18)  SpO2: 95% (01-31-20 @ 05:16) (92% - 95%)  Wt(kg): --  I&O's Summary      Appearance: Normal	  HEENT:   Normal oral mucosa, PERRL, EOMI	  Lymphatic: No lymphadenopathy  Cardiovascular: Normal S1 S2, No JVD, No murmurs, No edema  Respiratory: Lungs clear to auscultation	  Gastrointestinal:  Soft, Non-tender, + BS	  Skin: No rashes, No ecchymoses, No cyanosis	  Neurologic: Non-focal  Extremities: Normal range of motion, No clubbing, cyanosis or edema  Vascular: Peripheral pulses palpable 2+ bilaterally        ECG: nsr with prolonged QT 	    	  LABS:	 	      CARDIAC MARKERS ( 31 Jan 2020 06:07 )  <0.015 ng/mL / x     / 207 U/L / x     / 2.9 ng/mL  CARDIAC MARKERS ( 31 Jan 2020 02:16 )  0.035 ng/mL / x     / 185 U/L / x     / 2.5 ng/mL  CARDIAC MARKERS ( 30 Jan 2020 14:11 )  <0.015 ng/mL / x     / x     / x     / x                             14.7   13.49 )-----------( 254      ( 31 Jan 2020 06:07 )             45.3     01-31    138  |  102  |  13  ----------------------------<  316<H>  3.4<L>   |  28  |  0.77    Ca    8.8      31 Jan 2020 06:07  Phos  2.1     01-31  Mg     2.0     01-31    TPro  7.5  /  Alb  3.4<L>  /  TBili  0.8  /  DBili  0.2  /  AST  16  /  ALT  17  /  AlkPhos  76  01-31      Lipid Profile: Cholesterol 269    HDL 54      HgA1c: Hemoglobin A1C, Whole Blood: 11.5 % (01-31 @ 09:16)    TSH: Thyroid Stimulating Hormone, Serum: 0.75 uU/mL (01-31 @ 06:07)        EXAM:  CT BRAIN                            PROCEDURE DATE:  01/30/2020          INTERPRETATION:  CLINICAL STATEMENT: Fall    TECHNIQUE: CT of the head was performed without IV contrast.    COMPARISON: None.    FINDINGS:    There is diffuse parenchymal volume loss. There are areas of low attenuation in the periventricular white matter likely related to chronic microvascular ischemic changes.    There is no acute parenchymal hemorrhage, parenchymal mass, mass effect or midline shift. There is no extra-axial fluid collection. There is no acute territorial infarct.     The ventricles are prominent likely secondary to central atrophy. There is intracranial atherosclerosis. Incidental note is made of cavum septum pellucidum and vergae, a normal variant.    The calvarium is intact. The visualized paranasal sinuses are remarkable for a large polyp or retention cyst in the left maxillary sinus. The visualized orbits are unremarkable.    IMPRESSION:    No acute intracranial hemorrhage or acute territorial infarct.

## 2020-01-31 NOTE — CONSULT NOTE ADULT - ASSESSMENT
80 yo female with multiple comorbidities including h/o DM type 2, HTN, HLD admitted with syncope    endocrinology consulted for glycemic management    DM type 2  uncontrolled  complicated by hyperglycemia  HBA1C: 11.5  home regimen:  glipizide 10mg daily  metformin 1000mg bid    recommendations:  start basal bolus    - start insulin lantus 30 units daily in am, first dose now 1/31/20  - start insulin lispro 5 units pre meals  - increase to moderate dose sliding sca  - reassess based on requirements  - goal inpatient glucose range: 140-180mg/dl    HLD  on simvastatin 20mg at home- low dose  goal LDL <70,  LFTs wnl  - recommend switch from simvastatin to atorvastatin 40mg hs  - repeat fasting lipid profile in 8-12 weeks to assess response  - TGs mildly elevated- would expect improvement with low fat/diabetic diet and diabetes control    HTN  elevated  home anti HTNs to be resumed  per primary team    Discussed with patient and primary team  Please call  Endocrine- Dr Syl Olivas as needed 78 yo female with multiple comorbidities including h/o DM type 2, HTN, HLD admitted with syncope    endocrinology consulted for glycemic management    DM type 2  uncontrolled  complicated by hyperglycemia  HBA1C: 11.5  home regimen:  glipizide 10mg daily  metformin 1000mg bid    recommendations:  start basal bolus    - start insulin lantus 30 units daily in am, first dose now 1/31/20  - start insulin lispro 5 units pre meals  - increase to moderate dose sliding sca  - reassess based on requirements  - goal inpatient glucose range: 140-180mg/dl    HLD  on simvastatin 20mg at home- low dose  goal LDL <70,  LFTs wnl  - recommend switch from simvastatin to atorvastatin 40mg hs   (moderate intensity statin)  - repeat fasting lipid profile in 8-12 weeks to assess response  - TGs mildly elevated- would expect improvement with low fat/diabetic diet and diabetes control    HTN  elevated  home anti HTNs to be resumed  per primary team    Discussed with primary team  Please call  Endocrine- Dr Syl Olivas as needed

## 2020-01-31 NOTE — CONSULT NOTE ADULT - ASSESSMENT
79 Female with prior history of HTN, HLD, bilateral hand deformity and dementia came to hospital after she had a syncope at home. 79 Female with prior history of HTN, HLD, bilateral hand deformity and dementia came to hospital after she had a syncope at home.    Due to limited history about her syncope there is less suspicion of neurological causes of her loss of consciousness. Her exam also did not show any acute CVA with diffuse motor weakness of bilateral upper and lower extremity, hypoactive DTRs. She can get EEG for the suspicion of seizure disorder which can happen in diffuse volume loss and chronic microvascular changes as evident from her CT head. No need of antiepileptics needed until the results of EEG, for which the likelihood is low.

## 2020-01-31 NOTE — H&P ADULT - PROBLEM SELECTOR PLAN 5
IMPROVE VTE Individual Risk Assessment  RISK                                                          Points  [] Previous VTE                                           3  [] Thrombophilia                                        2  [] Lower limb paralysis                              2   [] Current Cancer                                       2   [] Immobilization > 24 hrs                        1  [] ICU/CCU stay > 24 hours                       1  [] Age > 60                                                   1  IMPROVE VTE Score = 2  lovenox for DVT chemoprophylaxis

## 2020-01-31 NOTE — PROGRESS NOTE ADULT - SUBJECTIVE AND OBJECTIVE BOX
HPI:  79 years old female from home, lives with her , with PMHx of HTN, DM, hyperlipidemia presenting s/p fall per ems, patient found on ground, unable to get up. Patient endorses she is unsure how she end up on the ground or at what time it happened. Patient was confused after the episode per .   Patient denies headache, nausea, vomiting, chest pain, shortness of breath. Patient also endorses knee pain on left side.  Patient is admitted for syncope. EKG is negative for ischemia. Orthostatic vitals is negative. Patient is being worked up for syncope. (2020 00:30)      Patient is a 79y old  Female who presents with a chief complaint of syncope (2020 11:32)      INTERVAL HPI/OVERNIGHT EVENTS:  T(C): 36.9 (20 @ 14:41), Max: 37.3 (20 @ 16:33)  HR: 79 (20 @ 14:41) (79 - 88)  BP: 142/54 (20 @ 14:41) (134/41 - 179/73)  RR: 18 (20 @ 14:41) (16 - 18)  SpO2: 95% (20 @ 14:41) (92% - 95%)  Wt(kg): --  I&O's Summary      REVIEW OF SYSTEMS: denies fever, chills, SOB, palpitations, chest pain, abdominal pain, nausea, vomitting, diarrhea, constipation, dizziness    MEDICATIONS  (STANDING):  amLODIPine   Tablet 5 milliGRAM(s) Oral daily  aspirin  chewable 81 milliGRAM(s) Oral daily  atorvastatin 40 milliGRAM(s) Oral at bedtime  cyanocobalamin Injectable 1000 MICROGram(s) IntraMuscular daily  enoxaparin Injectable 40 milliGRAM(s) SubCutaneous daily  insulin glargine Injectable (LANTUS) 30 Unit(s) SubCutaneous once  insulin lispro (HumaLOG) corrective regimen sliding scale   SubCutaneous three times a day before meals  metoprolol tartrate 25 milliGRAM(s) Oral two times a day  potassium acid phosphate/sodium acid phosphate tablet (K-PHOS No. 2) 1 Tablet(s) Oral four times a day with meals    MEDICATIONS  (PRN):      PHYSICAL EXAM:  GENERAL: NAD, well-groomed, well-developed  HEAD:  Atraumatic, Normocephalic  EYES: EOMI, PERRLA, conjunctiva and sclera clear  ENMT: No tonsillar erythema, exudates, or enlargement; Moist mucous membranes, Good dentition, No lesions  NECK: Supple, No JVD, Normal thyroid  NERVOUS SYSTEM:  Alert & Oriented X3, Good concentration; Motor Strength 5/5 B/L upper and lower extremities; DTRs 2+ intact and symmetric  CHEST/LUNG: Clear to percussion bilaterally; No rales, rhonchi, wheezing, or rubs  HEART: Regular rate and rhythm; No murmurs, rubs, or gallops  ABDOMEN: Soft, Nontender, Nondistended; Bowel sounds present  EXTREMITIES:  2+ Peripheral Pulses, No clubbing, cyanosis, or edema  LYMPH: No lymphadenopathy noted  SKIN: No rashes or lesions  LABS:                        14.7   13.49 )-----------( 254      ( 2020 06:07 )             45.3         138  |  102  |  13  ----------------------------<  316<H>  3.4<L>   |  28  |  0.77    Ca    8.8      2020 06:07  Phos  2.1       Mg     2.0         TPro  7.5  /  Alb  3.4<L>  /  TBili  0.8  /  DBili  0.2  /  AST  16  /  ALT  17  /  AlkPhos  76  -31    PT/INR - ( 2020 14:11 )   PT: 12.3 sec;   INR: 1.10 ratio         PTT - ( 2020 14:11 )  PTT:32.4 sec  Urinalysis Basic - ( 2020 16:48 )    Color: Yellow / Appearance: Clear / S.015 / pH: x  Gluc: x / Ketone: Large  / Bili: Negative / Urobili: Negative   Blood: x / Protein: 100 / Nitrite: Negative   Leuk Esterase: Negative / RBC: 10-25 /HPF / WBC 3-5 /HPF   Sq Epi: x / Non Sq Epi: Moderate /HPF / Bacteria: Few /HPF      CAPILLARY BLOOD GLUCOSE      POCT Blood Glucose.: 284 mg/dL (2020 08:28)        Urinalysis Basic - ( 2020 16:48 )    Color: Yellow / Appearance: Clear / S.015 / pH: x  Gluc: x / Ketone: Large  / Bili: Negative / Urobili: Negative   Blood: x / Protein: 100 / Nitrite: Negative   Leuk Esterase: Negative / RBC: 10-25 /HPF / WBC 3-5 /HPF   Sq Epi: x / Non Sq Epi: Moderate /HPF / Bacteria: Few /HPF

## 2020-01-31 NOTE — CONSULT NOTE ADULT - SUBJECTIVE AND OBJECTIVE BOX
Patient is a 79y old  Female who presents with a chief complaint of syncope (31 Jan 2020 00:30)    HPI: 79 Female with prior history of HTN, HLD and dementia came to hospital after she had a syncope at home. Due to her memory loss, she has no clue why she is here.     PAST MEDICAL & SURGICAL HISTORY:      FAMILY HISTORY: No significant         Social Hx:  Nonsmoker, no drug or alcohol use    MEDICATIONS  (STANDING):  amLODIPine   Tablet 5 milliGRAM(s) Oral daily  aspirin  chewable 81 milliGRAM(s) Oral daily  enoxaparin Injectable 40 milliGRAM(s) SubCutaneous daily  insulin lispro (HumaLOG) corrective regimen sliding scale   SubCutaneous Before meals and at bedtime  metoprolol tartrate 25 milliGRAM(s) Oral two times a day  potassium acid phosphate/sodium acid phosphate tablet (K-PHOS No. 2) 1 Tablet(s) Oral four times a day with meals  potassium chloride    Tablet ER 40 milliEquivalent(s) Oral once  simvastatin 20 milliGRAM(s) Oral at bedtime       Allergies    penicillin (Unknown)    Intolerances        Vital Signs Last 24 Hrs  T(C): 36.6 (31 Jan 2020 05:16), Max: 37.6 (30 Jan 2020 13:45)  T(F): 97.8 (31 Jan 2020 05:16), Max: 99.6 (30 Jan 2020 13:45)  HR: 81 (31 Jan 2020 03:27) (81 - 93)  BP: 179/73 (31 Jan 2020 03:27) (134/41 - 179/73)  BP(mean): --  RR: 18 (31 Jan 2020 05:16) (16 - 18)  SpO2: 95% (31 Jan 2020 05:16) (92% - 95%)    Review of Systems      Constitutional: awake and alert.  HEENT: PERRLA, EOMI,   Neck: Supple.  Respiratory: Breath sounds are clear bilaterally  Cardiovascular: S1 and S2, regular / irregular rhythm  Gastrointestinal: soft, nontender  Extremities:  no edema  Vascular: Caritid Bruit - no  Musculoskeletal: no joint swelling/tenderness, no abnormal movements  Skin: No rashes    Neurological exam:  HF: A x O x 3. Appropriately interactive, normal affect. Speech fluent, No Aphasia or paraphasic errors. Naming /repetition intact   CN: PIETER, EOMI, VFF, facial sensation normal, no NLFD, tongue midline, Palate moves equally, SCM equal bilaterally  Motor: No pronator drift, Strength 5/5 in all 4 ext, normal bulk and tone, no tremor, rigidity or bradykinesia.    Sens: Intact to light touch / PP/ VS/ JS    Reflexes: Symmetric and normal . BJ 2+, BR 2+, KJ 2+, AJ 2+, downgoing toes b/l  Coord:  No FNFA, dysmetria, JERI intact   Gait/Balance: Normal/Cannot test    NIHSS:          Labs:                        14.7   13.49 )-----------( 254      ( 31 Jan 2020 06:07 )             45.3     01-31    138  |  102  |  13  ----------------------------<  316<H>  3.4<L>   |  28  |  0.77    Ca    8.8      31 Jan 2020 06:07  Phos  2.1     01-31  Mg     2.0     01-31    TPro  7.5  /  Alb  3.4<L>  /  TBili  0.8  /  DBili  0.2  /  AST  16  /  ALT  17  /  AlkPhos  76  01-31 01-31 Chol 269<H>  HDL 54 Trig 163<H>  PT/INR - ( 30 Jan 2020 14:11 )   PT: 12.3 sec;   INR: 1.10 ratio         PTT - ( 30 Jan 2020 14:11 )  PTT:32.4 sec    Radiology report:  - CT head:  - MRI brain  - MRA head/carotids  - EEG    A/P:    - No IV tpa given because…  - ASA/ PLavix  - Statin  - Dysphagia screen  - DVT prophylaxia  - MRI/A brain-carotids  - PT eval  - Speech/swallow eval    Total Critical Care Time spent: Patient is a 79y old  Female who presents with a chief complaint of syncope (31 Jan 2020 00:30)    HPI: 79 Female with prior history of HTN, HLD, bilateral hand deformity and dementia came to hospital after she had a syncope at home. Due to her memory loss, she has no clue why she is here. She lives with her . Unable to reach any family member for detail history. She over all feels fine. Non productive cough for a week. No fever, chest pain, shortness of breath, problems with urine or bowel.       PAST MEDICAL & SURGICAL HISTORY:  Hypertension  Hyperlipidemia     FAMILY HISTORY: No significant         Social Hx:  Nonsmoker, no drug or alcohol use    MEDICATIONS  (STANDING):  amLODIPine   Tablet 5 milliGRAM(s) Oral daily  aspirin  chewable 81 milliGRAM(s) Oral daily  enoxaparin Injectable 40 milliGRAM(s) SubCutaneous daily  insulin lispro (HumaLOG) corrective regimen sliding scale   SubCutaneous Before meals and at bedtime  metoprolol tartrate 25 milliGRAM(s) Oral two times a day  potassium acid phosphate/sodium acid phosphate tablet (K-PHOS No. 2) 1 Tablet(s) Oral four times a day with meals  potassium chloride    Tablet ER 40 milliEquivalent(s) Oral once  simvastatin 20 milliGRAM(s) Oral at bedtime       Allergies  penicillin (Unknown)        Vital Signs Last 24 Hrs  T(C): 36.6 (31 Jan 2020 05:16), Max: 37.6 (30 Jan 2020 13:45)  T(F): 97.8 (31 Jan 2020 05:16), Max: 99.6 (30 Jan 2020 13:45)  HR: 81 (31 Jan 2020 03:27) (81 - 93)  BP: 179/73 (31 Jan 2020 03:27) (134/41 - 179/73)  BP(mean): --  RR: 18 (31 Jan 2020 05:16) (16 - 18)  SpO2: 95% (31 Jan 2020 05:16) (92% - 95%)    Review of Systems.  CONSTITUTIONAL: Generalized weakness, no fevers or chills  EYES/ENT: No visual changes;  No vertigo or throat pain   NECK: No pain or stiffness  RESPIRATORY: + cough, + wheezing  CARDIOVASCULAR: No chest pain or palpitations  GASTROINTESTINAL: No abdominal or epigastric pain. No nausea, vomiting, or hematemesis  GENITOURINARY: No dysuria, frequency or hematuria  NEUROLOGICAL: No numbness or weakness  SKIN: No itching, burning, rashes, or lesions   All other review of systems is negative unless indicated above.      PHYSICAL EXAM:  GENERAL: Well developed, Elderly female, NAD  HEENT:  Normocephalic/Atraumatic, reactive light reflex, moist mucous membranes  NECK: Supple, no JVD  RESP: Symmetric movement of the chest, clear to auscultation bilaterally  CVS: S1 and S2 audible, no murmur, rubs or gallops noted  GI: Normal active bowel sounds present, abdomen soft, non tender, non distended  EXTREMITIES:  1+ edema, no clubbing, cyanosis   PSYCH: Normal mood, normal affect observed      - Mental Status:  AAOx1; speech is fluent with intact naming, repetition  - Cranial Nerves II-XII:    II:  PERRLA; visual fields are full to confrontation  III, IV, VI:  EOMI, no nystagmus  V:  facial sensation is intact in the V1-V3 distribution bilaterally.  VII:  face is symmetric with normal eye closure and smile  VIII:  hearing is intact to finger rub  XI:  head turning and shoulder shrug are intact bilaterally  XII:  tongue protrudes in the midline  - Motor:  strength is 2/5 throughout; normal muscle bulk; no pronator drift  - Reflexes:  2+ and symmetric at the biceps, knees;  plantar reflexes downgoing bilaterally  - Sensory:  intact to light touch, pin prick, vibration, and joint-position sense throughout  - Coordination:  finger-nose-finger and heel-knee-shin intact without dysmetria; rapid alternating hand movements intact          Labs:                        14.7   13.49 )-----------( 254      ( 31 Jan 2020 06:07 )             45.3     01-31    138  |  102  |  13  ----------------------------<  316<H>  3.4<L>   |  28  |  0.77    Ca    8.8      31 Jan 2020 06:07  Phos  2.1     01-31  Mg     2.0     01-31    TPro  7.5  /  Alb  3.4<L>  /  TBili  0.8  /  DBili  0.2  /  AST  16  /  ALT  17  /  AlkPhos  76  01-31 01-31 Chol 269<H>  HDL 54 Trig 163<H>  PT/INR - ( 30 Jan 2020 14:11 )   PT: 12.3 sec;   INR: 1.10 ratio         PTT - ( 30 Jan 2020 14:11 )  PTT:32.4 sec Patient is a 79y old  Female who presents with a chief complaint of syncope (31 Jan 2020 00:30)    HPI: 79 Female with prior history of HTN, HLD, bilateral hand deformity came to hospital after she had a syncope at home. Due to her mild dementia, she has no clue why she is here. She lives with her . Unable to reach any family member for detail history. She over all feels fine. Non productive cough for a week. No fever, chest pain, shortness of breath, problems with urine or bowel.       PAST MEDICAL & SURGICAL HISTORY:  Hypertension  Hyperlipidemia     FAMILY HISTORY: No significant         Social Hx:  Nonsmoker, no drug or alcohol use    MEDICATIONS  (STANDING):  amLODIPine   Tablet 5 milliGRAM(s) Oral daily  aspirin  chewable 81 milliGRAM(s) Oral daily  enoxaparin Injectable 40 milliGRAM(s) SubCutaneous daily  insulin lispro (HumaLOG) corrective regimen sliding scale   SubCutaneous Before meals and at bedtime  metoprolol tartrate 25 milliGRAM(s) Oral two times a day  potassium acid phosphate/sodium acid phosphate tablet (K-PHOS No. 2) 1 Tablet(s) Oral four times a day with meals  potassium chloride    Tablet ER 40 milliEquivalent(s) Oral once  simvastatin 20 milliGRAM(s) Oral at bedtime       Allergies  penicillin (Unknown)        Vital Signs Last 24 Hrs  T(C): 36.6 (31 Jan 2020 05:16), Max: 37.6 (30 Jan 2020 13:45)  T(F): 97.8 (31 Jan 2020 05:16), Max: 99.6 (30 Jan 2020 13:45)  HR: 81 (31 Jan 2020 03:27) (81 - 93)  BP: 179/73 (31 Jan 2020 03:27) (134/41 - 179/73)  BP(mean): --  RR: 18 (31 Jan 2020 05:16) (16 - 18)  SpO2: 95% (31 Jan 2020 05:16) (92% - 95%)    Review of Systems.  CONSTITUTIONAL: Generalized weakness, no fevers or chills  EYES/ENT: No visual changes;  No vertigo or throat pain   NECK: No pain or stiffness  RESPIRATORY: + cough, + wheezing  CARDIOVASCULAR: No chest pain or palpitations  GASTROINTESTINAL: No abdominal or epigastric pain. No nausea, vomiting, or hematemesis  GENITOURINARY: No dysuria, frequency or hematuria  NEUROLOGICAL: No numbness or weakness  SKIN: No itching, burning, rashes, or lesions   All other review of systems is negative unless indicated above.      PHYSICAL EXAM:  GENERAL: Well developed, Elderly female, NAD  HEENT:  Normocephalic/Atraumatic, reactive light reflex, moist mucous membranes  NECK: Supple, no JVD  RESP: Symmetric movement of the chest, clear to auscultation bilaterally  CVS: S1 and S2 audible, no murmur, rubs or gallops noted  GI: Normal active bowel sounds present, abdomen soft, non tender, non distended  EXTREMITIES:  1+ edema, no clubbing, hands deformity   PSYCH: Normal mood, normal affect observed      - Mental Status:  AAOx1; speech is fluent with intact naming, repetition  - Cranial Nerves II-XII:    II:  PERRLA; visual fields are full to confrontation  III, IV, VI:  EOMI, no nystagmus  V:  facial sensation is intact in the V1-V3 distribution bilaterally.  VII:  face is symmetric with normal eye closure and smile  VIII:  hearing is intact to finger rub  XI:  head turning and shoulder shrug are intact bilaterally  XII:  tongue protrudes in the midline  - Motor:  strength is 2/5 throughout; normal muscle bulk; no pronator drift  - Reflexes:  2+ and symmetric at the biceps, knees;  plantar reflexes downgoing bilaterally  - Sensory:  intact to light touch, pin prick, vibration, and joint-position sense throughout  - Coordination:  finger-nose-finger and heel-knee-shin intact without dysmetria; rapid alternating hand movements intact          Labs:                        14.7   13.49 )-----------( 254      ( 31 Jan 2020 06:07 )             45.3     01-31    138  |  102  |  13  ----------------------------<  316<H>  3.4<L>   |  28  |  0.77    Ca    8.8      31 Jan 2020 06:07  Phos  2.1     01-31  Mg     2.0     01-31    TPro  7.5  /  Alb  3.4<L>  /  TBili  0.8  /  DBili  0.2  /  AST  16  /  ALT  17  /  AlkPhos  76  01-31 01-31 Chol 269<H>  HDL 54 Trig 163<H>  PT/INR - ( 30 Jan 2020 14:11 )   PT: 12.3 sec;   INR: 1.10 ratio         PTT - ( 30 Jan 2020 14:11 )  PTT:32.4 sec

## 2020-01-31 NOTE — H&P ADULT - NSHPPHYSICALEXAM_GEN_ALL_CORE
Vital Signs Last 24 Hrs  T(C): 37.1 (31 Jan 2020 02:18), Max: 37.6 (30 Jan 2020 13:45)  T(F): 98.7 (31 Jan 2020 02:18), Max: 99.6 (30 Jan 2020 13:45)  HR: 81 (31 Jan 2020 02:18) (81 - 93)  BP: 134/41 (30 Jan 2020 23:19) (134/41 - 143/72)  BP(mean): --  RR: 17 (31 Jan 2020 02:18) (16 - 18)  SpO2: 94% (31 Jan 2020 02:18) (94% - 95%)  · CONSTITUTIONAL: Well appearing, awake, alert, oriented to person, place, time/situation and in no apparent distress.  · ENMT: Airway patent, Nasal mucosa clear. Mouth with normal mucosa. Throat has no vesicles, no oropharyngeal exudates and uvula is midline.  · EYES: Clear bilaterally, pupils equal, round and reactive to light.  · CARDIAC: Normal rate, regular rhythm.  Heart sounds S1, S2.  No murmurs, rubs or gallops.  · RESPIRATORY: Breath sounds clear and equal bilaterally.  · GASTROINTESTINAL: Abdomen soft, non-tender, no guarding.  · MUSCULOSKELETAL: Spine appears normal, range of motion is not limited, no muscle or joint tenderness  · NEUROLOGICAL: Alert and oriented, no focal deficits, no motor or sensory deficits.  · SKIN: Skin normal color for race, warm, dry and intact. No evidence of rash.  · PSYCHIATRIC: Alert and oriented to person, place, time/situation. normal mood and affect. no apparent risk to self or others.  · HEME LYMPH: No adenopathy or splenomegaly. No cervical or inguinal lymphadenopathy.

## 2020-01-31 NOTE — PATIENT PROFILE ADULT - NSPROEDALEARNPREF_GEN_A_NUR
individual instruction/verbal instruction individual instruction/verbal instruction/written material

## 2020-01-31 NOTE — H&P ADULT - PROBLEM SELECTOR PLAN 1
Patient is admitted with syncopal episode.  patient was confused after the episode.  patient denies any chest pain.  EKG is negative for ischemia.  orthostatic negative.  cardiac troponin x2 is negative  admitted to telemetry.  f/u echo.  f/u us carotid  cardiology consult  dr fragoso  neurology consult  ct is negative for any acute pathology

## 2020-01-31 NOTE — PATIENT PROFILE ADULT - HAVE YOU EXPERIENCED A TRAUMATIC EVENT?
Anesthesia Pre-Procedure Evaluation    Patient: Sylvester Turk   MRN: [id number] : 1936          Preoperative Diagnosis: CHOLEDOCHOLITHIASIS.    Procedure(s):  CHOLECYSTECTOMY, LAPAROSCOPIC    Past Medical History:   Diagnosis Date     Abdominal aortic aneurysm (AAA) (H)      Anxiety      Arthritis      Basal cell carcinoma      Benign prostatic hyperplasia with lower urinary tract symptoms      BPH (benign prostatic hyperplasia)      Dementia with Lewy bodies      Femur fracture (H)      Glaucoma      Hypothyroidism      Kidney stones      Parkinson's disease (H)      Parkinsons disease (H)      Peripheral neuropathy      Past Surgical History:   Procedure Laterality Date     APPENDECTOMY       CATARACT IOL, RT/LT Left 05/10/2019     ENDOSCOPIC RETROGRADE CHOLANGIOPANCREATOGRAM N/A 2019    Procedure: ENDOSCOPIC RETROGRADE CHOLANGIOPANCREATOGRAPHY, WITH CALCULUS REMOVAL USING BALLOON CATHETER;  Surgeon: Marlin Bennett MD;  Location: [facility]     ENDOSCOPIC RETROGRADE CHOLANGIOPANCREATOGRAM N/A 2019    Procedure: ENDOSCOPIC RETROGRADE CHOLANGIOPANCREATOGRAPHY, WITH SPHINCTEROTOMY;  Surgeon: Marlin Bennett MD;  Location: [facility]     EXAM UNDER ANESTHESIA EYE(S) Bilateral 5/10/2019    Procedure: Exam under anesthesia eye(s);  Surgeon: Geo Rodriguez MD;  Location: [facility]     EYE SURGERY       OPEN REDUCTION INTERNAL FIXATION HIP BIPOLAR Left 12/15/2018    Procedure: Left hip hemiarthroplasty;  Surgeon: Ronny Tony MD;  Location: [facility]     PHACOEMULSIFICATION CLEAR CORNEA WITH STANDARD INTRAOCULAR LENS IMPLANT Left 5/10/2019    Procedure: Left Eye Phacoemulcification Clear Cornea with Standard Lens Implant;  Surgeon: Geo Rodriguez MD;  Location: [facility]       Anesthesia Evaluation     . Pt has had prior anesthetic. Type: General    No history of anesthetic complications          ROS/MED HX    ENT/Pulmonary:      (-) tobacco use and sleep apnea   Neurologic:      (+)dementia, Parkinson's disease     Cardiovascular: Comment: AAA now 7.6 cm with mural thrombus.  Vasc Surgery evaluated; family has deferred intervention.  Family and patient well aware of the consequences and if rupture will be comfort care.    (+) hypertension-range: BPs very high in hospital. , Peripheral Vascular Disease-- Non Symptomatic, --. : . . . :. .       METS/Exercise Tolerance:     Hematologic:         Musculoskeletal:         GI/Hepatic:        (-) GERD   Renal/Genitourinary: Comment: BPH    (+) chronic renal disease, type: CRI,       Endo:     (+) thyroid problem hypothyroidism, .      Psychiatric:     (+) psychiatric history anxiety (Lewy body dementia)      Infectious Disease:         Malignancy:         Other:                          Physical Exam  Normal systems: cardiovascular, pulmonary and dental    Airway   Mallampati: III  TM distance: >3 FB  Neck ROM: full    Dental     Cardiovascular       Pulmonary             Lab Results   Component Value Date    WBC 9.0 09/03/2019    HGB 13.3 09/03/2019    HCT 40.2 09/03/2019     09/03/2019     09/03/2019    POTASSIUM 3.2 (L) 09/03/2019    CHLORIDE 101 09/03/2019    CO2 26 09/03/2019    BUN 11 09/03/2019    CR 0.73 09/03/2019    GLC 89 09/03/2019    THI 8.5 09/03/2019    PHOS 2.0 (L) 09/03/2019    ALBUMIN 3.3 (L) 09/03/2019    PROTTOTAL 7.0 09/03/2019    ALT 67 09/03/2019    AST 46 (H) 09/03/2019    ALKPHOS 279 (H) 09/03/2019    BILITOTAL 1.1 09/03/2019    INR 1.07 09/01/2019       Preop Vitals  BP Readings from Last 3 Encounters:   09/03/19 (!) 186/117   08/30/19 (!) 197/120   05/10/19 (!) 140/95    Pulse Readings from Last 3 Encounters:   09/03/19 86   05/10/19 67   02/09/19 96      Resp Readings from Last 3 Encounters:   09/03/19 16   08/30/19 28   05/10/19 11    SpO2 Readings from Last 3 Encounters:   09/03/19 98%   08/30/19 100%   05/10/19 96%      Temp Readings from Last 1 Encounters:   09/03/19 36.3  C (97.4  F) (Oral)    Ht  "Readings from Last 1 Encounters:   05/10/19 1.88 m (6' 2\")      Wt Readings from Last 1 Encounters:   09/03/19 83 kg (182 lb 15.7 oz)    Estimated body mass index is 23.49 kg/m  as calculated from the following:    Height as of 5/10/19: 1.88 m (6' 2\").    Weight as of this encounter: 83 kg (182 lb 15.7 oz).       Anesthesia Plan      History & Physical Review  History and physical reviewed and following examination; no interval change.    ASA Status:  3 .    NPO Status:  > 8 hours    Plan for General and ETT with Intravenous induction. Maintenance will be Balanced.    PONV prophylaxis:  Ondansetron (or other 5HT-3)  AAA now 7.6 cm.  Vasc Surgery evaluated; family has deferred intervention.  Family and patient well aware of the consequences and if rupture will be comfort care.      Postoperative Care  Postoperative pain management:  Multi-modal analgesia.      Consents  Anesthetic plan, risks, benefits and alternatives discussed with:  Patient.  Use of blood products discussed: No .   .                 Yasir Horn MD  " no

## 2020-01-31 NOTE — H&P ADULT - PROBLEM SELECTOR PLAN 2
patient takes lisinopril, metoprolol and amlodipine,  resume metoprolol and amlodipine.  monitor blood pressure

## 2020-01-31 NOTE — H&P ADULT - ASSESSMENT
79 years old female from home, lives with her , with PMHx of HTN, DM, hyperlipidemia presenting s/p fall per ems, patient found on ground, unable to get up. Patient endorses she is unsure how she end up on the ground or at what time it happened. Patient was confused after the episode per .   Patient denies headache, nausea, vomiting, chest pain, shortness of breath. Patient also endorses knee pain on left side.  Patient is admitted for syncope. EKG is negative for ischemia. Orthostatic vitals is negative. Patient is being worked up for syncope.

## 2020-01-31 NOTE — CONSULT NOTE ADULT - SUBJECTIVE AND OBJECTIVE BOX
Patient is a 79y old  Female who presents with a chief complaint of syncope (2020 10:34)      HPI:  79 years old female from home, lives with her , with PMHx of HTN, DM, hyperlipidemia presenting s/p fall per ems, patient found on ground, unable to get up. Patient endorses she is unsure how she end up on the ground or at what time it happened. Patient was confused after the episode per .   Patient denies headache, nausea, vomiting, chest pain, shortness of breath. Patient also endorses knee pain on left side.  Patient is admitted for syncope. EKG is negative for ischemia. Orthostatic vitals is negative. Patient is being worked up for syncope. (2020 00:30)    endocrinology consulted for glycemic management  home regimen includes:  glipizide 10mg daily  metformin 1000mg bid      PAST MEDICAL & SURGICAL HISTORY:         MEDICATIONS  (STANDING):  amLODIPine   Tablet 5 milliGRAM(s) Oral daily  aspirin  chewable 81 milliGRAM(s) Oral daily  enoxaparin Injectable 40 milliGRAM(s) SubCutaneous daily  insulin lispro (HumaLOG) corrective regimen sliding scale   SubCutaneous Before meals and at bedtime  metoprolol tartrate 25 milliGRAM(s) Oral two times a day  potassium acid phosphate/sodium acid phosphate tablet (K-PHOS No. 2) 1 Tablet(s) Oral four times a day with meals  potassium chloride    Tablet ER 40 milliEquivalent(s) Oral once  simvastatin 20 milliGRAM(s) Oral at bedtime    MEDICATIONS  (PRN):      FAMILY HISTORY:      SOCIAL HISTORY:      REVIEW OF SYSTEMS:  CONSTITUTIONAL: fatigue  EYES: No eye pain, visual disturbances, or discharge  ENT:  No difficulty hearing, tinnitus, vertigo; No sinus or throat pain  NECK: No pain or stiffness  RESPIRATORY: No cough, wheezing, chills or hemoptysis; No Shortness of Breath  CARDIOVASCULAR:palpitations  GASTROINTESTINAL: No abdominal or epigastric pain. No nausea, vomiting, or hematemesis; No diarrhea or constipation. No melena or hematochezia.  GENITOURINARY: No dysuria, frequency, hematuria, or incontinence  NEUROLOGICAL: syncope, confusion  SKIN: No itching, burning, rashes, or lesions   LYMPH Nodes: No enlarged glands  ENDOCRINE: No heat or cold intolerance; No hair loss  MUSCULOSKELETAL: No joint pain or swelling; No muscle, back, or extremity pain  PSYCHIATRIC: No depression, anxiety, mood swings, or difficulty sleeping  HEME/LYMPH: No easy bruising, or bleeding gums  ALLERGY AND IMMUNOLOGIC: No hives or eczema	        Vital Signs Last 24 Hrs  T(C): 36.6 (2020 05:16), Max: 37.6 (2020 13:45)  T(F): 97.8 (2020 05:16), Max: 99.6 (2020 13:45)  HR: 81 (2020 03:27) (81 - 93)  BP: 179/73 (2020 03:27) (134/41 - 179/73)  BP(mean): --  RR: 18 (2020 05:16) (16 - 18)  SpO2: 95% (2020 05:16) (92% - 95%)      Constitutional:    NC/AT:    HEENT:    Neck:  No JVD  Respiratory:  Clear without rales or rhonchi    Cardiovascular:  RR   Gastrointestinal: Soft , non tender    Extremities: without cyanosis    Neurological:  Oriented   x  3            LABS:                        14.7   13.49 )-----------( 254      ( 2020 06:07 )             45.3     01-31    138  |  102  |  13  ----------------------------<  316<H>  3.4<L>   |  28  |  0.77    Ca    8.8      2020 06:07  Phos  2.1     -31  Mg     2.0     -31    TPro  7.5  /  Alb  3.4<L>  /  TBili  0.8  /  DBili  0.2  /  AST  16  /  ALT  17  /  AlkPhos  76  01-31    CARDIAC MARKERS ( 2020 06:07 )  <0.015 ng/mL / x     / 207 U/L / x     / 2.9 ng/mL  CARDIAC MARKERS ( 2020 02:16 )  0.035 ng/mL / x     / 185 U/L / x     / 2.5 ng/mL  CARDIAC MARKERS ( 2020 14:11 )  <0.015 ng/mL / x     / x     / x     / x          PT/INR - ( 2020 14:11 )   PT: 12.3 sec;   INR: 1.10 ratio         PTT - ( 2020 14:11 )  PTT:32.4 sec  Urinalysis Basic - ( 2020 16:48 )    Color: Yellow / Appearance: Clear / S.015 / pH: x  Gluc: x / Ketone: Large  / Bili: Negative / Urobili: Negative   Blood: x / Protein: 100 / Nitrite: Negative   Leuk Esterase: Negative / RBC: 10-25 /HPF / WBC 3-5 /HPF   Sq Epi: x / Non Sq Epi: Moderate /HPF / Bacteria: Few /HPF      CAPILLARY BLOOD GLUCOSE      POCT Blood Glucose.: 284 mg/dL (2020 08:28)  POCT Blood Glucose.: 370 mg/dL (2020 12:11)      RADIOLOGY & ADDITIONAL STUDIES:

## 2020-01-31 NOTE — H&P ADULT - HISTORY OF PRESENT ILLNESS
79 y.o presenting s/p fall. per ems, patient found on ground, unable to get up. patient denies pmh, endorses she is unsure how she end up on the ground or at what time. denies headache, n, v, cp, sob. endorses knee pain. 79 years old female from home, lives with her , with PMHx of HTN, DM, hyperlipidemia presenting s/p fall per ems, patient found on ground, unable to get up. Patient endorses she is unsure how she end up on the ground or at what time it happened. Patient was confused after the episode per .   Patient denies headache, nausea, vomiting, chest pain, shortness of breath. Patient also endorses knee pain on left side.  Patient is admitted for syncope. EKG is negative for ischemia. Orthostatic vitals is negative. Patient is being worked up for syncope.

## 2020-01-31 NOTE — CONSULT NOTE ADULT - ASSESSMENT
79 years old female from home, lives with her , with PMHx of HTN, DM, hyperlipidemia presenting s/p fall per ems, patient found on ground, uincontrolled DM.  1.Tele monitoring.  2.Orthostatic BP q shift.  3.Echocardiogram.  4.Neurology eval.  5.Uncontrolled DM-Endo eval,Insulin.  6.HTN-cont bp medication.  7.Lipid d/o-statin.  8.Check urine cx.  9.GI and DVT prophylaxis.

## 2020-02-01 NOTE — PHYSICAL THERAPY INITIAL EVALUATION ADULT - PLANNED THERAPY INTERVENTIONS, PT EVAL
transfer training/gait training/balance training/bed mobility training/strengthening/neuromuscular re-education

## 2020-02-01 NOTE — PHYSICAL THERAPY INITIAL EVALUATION ADULT - PERTINENT HX OF CURRENT PROBLEM, REHAB EVAL
Pt admitted for evaluation of syncope, s/p fall at home Pt admitted for evaluation of syncope, s/p fall at home, Head CT negative for acute findings

## 2020-02-01 NOTE — PHYSICAL THERAPY INITIAL EVALUATION ADULT - RANGE OF MOTION EXAMINATION, REHAB EVAL
bilateral upper extremity ROM was WFL (within functional limits)/bilateral lower extremity ROM was WFL (within functional limits)/except for both shoulder flexion to ~ 90 deg

## 2020-02-01 NOTE — PROGRESS NOTE ADULT - SUBJECTIVE AND OBJECTIVE BOX
CHIEF COMPLAINT:Patient is a 79y old  Female who presents with a chief complaint of syncope .Pt appears comfortable.    	  REVIEW OF SYSTEMS:  CONSTITUTIONAL: No fever, weight loss, or fatigue  EYES: No eye pain, visual disturbances, or discharge  ENT:  No difficulty hearing, tinnitus, vertigo; No sinus or throat pain  NECK: No pain or stiffness  RESPIRATORY: No cough, wheezing, chills or hemoptysis; No Shortness of Breath  CARDIOVASCULAR: No chest pain, palpitations, passing out, dizziness, or leg swelling  GASTROINTESTINAL: No abdominal or epigastric pain. No nausea, vomiting, or hematemesis; No diarrhea or constipation. No melena or hematochezia.  GENITOURINARY: No dysuria, frequency, hematuria, or incontinence  NEUROLOGICAL: No headaches, memory loss, loss of strength, numbness, or tremors  SKIN: No itching, burning, rashes, or lesions   LYMPH Nodes: No enlarged glands  ENDOCRINE: No heat or cold intolerance; No hair loss  MUSCULOSKELETAL: No joint pain or swelling; No muscle, back, or extremity pain  PSYCHIATRIC: No depression, anxiety, mood swings, or difficulty sleeping  HEME/LYMPH: No easy bruising, or bleeding gums  ALLERGY AND IMMUNOLOGIC: No hives or eczema	      PHYSICAL EXAM:  T(C): 36.6 (02-01-20 @ 07:36), Max: 39.6 (02-01-20 @ 01:42)  HR: 62 (02-01-20 @ 07:36) (62 - 88)  BP: 162/55 (02-01-20 @ 07:36) (119/46 - 192/63)  RR: 18 (02-01-20 @ 07:36) (18 - 18)  SpO2: 98% (02-01-20 @ 07:36) (94% - 98%)  Wt(kg): --  I&O's Summary      Appearance: Normal	  HEENT:   Normal oral mucosa, PERRL, EOMI	  Lymphatic: No lymphadenopathy  Cardiovascular: Normal S1 S2, No JVD, No murmurs, No edema  Respiratory: Lungs clear to auscultation	  Psychiatry: A & O x 3, Mood & affect appropriate  Gastrointestinal:  Soft, Non-tender, + BS	  Skin: No rashes, No ecchymoses, No cyanosis	  Neurologic: Non-focal  Extremities: Normal range of motion, No clubbing, cyanosis or edema  Vascular: Peripheral pulses palpable 2+ bilaterally    MEDICATIONS  (STANDING):  amLODIPine   Tablet 5 milliGRAM(s) Oral daily  aspirin  chewable 81 milliGRAM(s) Oral daily  atorvastatin 40 milliGRAM(s) Oral at bedtime  cyanocobalamin Injectable 1000 MICROGram(s) IntraMuscular daily  enoxaparin Injectable 40 milliGRAM(s) SubCutaneous daily  insulin glargine Injectable (LANTUS) 30 Unit(s) SubCutaneous every morning  insulin lispro (HumaLOG) corrective regimen sliding scale   SubCutaneous three times a day before meals  insulin lispro Injectable (HumaLOG) 5 Unit(s) SubCutaneous three times a day before meals  losartan 25 milliGRAM(s) Oral two times a day  metoprolol tartrate 25 milliGRAM(s) Oral two times a day      TELEMETRY: 	nsr      	  LABS:	 	    CARDIAC MARKERS:  CARDIAC MARKERS ( 31 Jan 2020 06:07 )  <0.015 ng/mL / x     / 207 U/L / x     / 2.9 ng/mL  CARDIAC MARKERS ( 31 Jan 2020 02:16 )  0.035 ng/mL / x     / 185 U/L / x     / 2.5 ng/mL  CARDIAC MARKERS ( 30 Jan 2020 14:11 )  <0.015 ng/mL / x     / x     / x     / x                                    14.8   10.74 )-----------( 232      ( 01 Feb 2020 07:36 )             46.2     02-01    136  |  103  |  14  ----------------------------<  264<H>  4.7   |  29  |  0.75    Ca    8.7      01 Feb 2020 07:36  Phos  2.7     02-01  Mg     2.1     02-01    TPro  7.7  /  Alb  3.3<L>  /  TBili  0.7  /  DBili  x   /  AST  38  /  ALT  21  /  AlkPhos  75  02-01    Lipid Profile: Cholesterol 269    HDL 54      HgA1c: Hemoglobin A1C, Whole Blood: 11.5 % (01-31 @ 09:16)    TSH: Thyroid Stimulating Hormone, Serum: 1.58 uU/mL (02-01 @ 07:36)  Thyroid Stimulating Hormone, Serum: 0.75 uU/mL (01-31 @ 06:07)      Mitral Valve: Normalmitral valve. Mild mitral  regurgitation.  Aortic Root: Aortic Root: 3.4 cm.  Aortic Valve: Normal trileaflet aortic valve.  Left Atrium: Severely dilated left atrium.  LA volume index  = 69 cc/m2.  Left Ventricle: Normal Left Ventricular Systolic Function,  (EF = 55 to 60%) No regional wall motion abnormalities.  Mild concentric left ventricular hypertrophy. Grade I  diastolic dysfunction (Impaired relaxation).  Right Heart: Normal right atrium. Normal right ventricular  size and function. There is trace tricuspid regurgitation.  There is mild pulmonic regurgitation.  Pericardium/PleuraNormal pericardium with no pericardial  effusion.  Hemodynamic: Unable to estimate RVSP.    Culture - Urine (01.31.20 @ 01:09)    Specimen Source: .Urine    Culture Results:   >=3 organisms. Probable collection contamination.    Vitamin D, 25-Hydroxy: 22.1: VITD Interpretive Data Result:  30.0-80.0 ng/mL Optimal levels (Reference Range)  >80.0 ng/mL Toxicity possible  20.0-29.0 ng/mL Insufficiency  10.0-19.0 ng/mL Mild to Moderate Deficiency  <10.0 ng/mL Severe Deficiency  Optimal levels for 25-Hydroxy vitamin D are 30.0 ng/mL and above based  upon the Endocrine Society guidelines 2011.  However, there is a lack of  consensus on this and the [employer] recommends 20.0 ng/mL and  above as optimal levels.  Vitamin D results may vary depending on the  method of analysis.  The Roche marek [id number] electrochemiluminescent  immunoassay method measures both D2 and D3. ng/mL (02.01.20 @ 01:26)

## 2020-02-01 NOTE — PROGRESS NOTE ADULT - SUBJECTIVE AND OBJECTIVE BOX
HPI:  79 years old female from home, lives with her , with PMHx of HTN, DM, hyperlipidemia presenting s/p fall per ems, patient found on ground, unable to get up. Patient endorses she is unsure how she end up on the ground or at what time it happened. Patient was confused after the episode per .   Patient denies headache, nausea, vomiting, chest pain, shortness of breath. Patient also endorses knee pain on left side.  Patient is admitted for syncope. EKG is negative for ischemia. Orthostatic vitals is negative. Patient is being worked up for syncope. (2020 00:30)      Patient is a 79y old  Female who presents with a chief complaint of syncope (2020 11:06)      INTERVAL HPI/OVERNIGHT EVENTS:  T(C): 36.6 (20 @ 15:30), Max: 39.6 (20 @ 01:42)  HR: 71 (20 @ 15:30) (62 - 88)  BP: 154/51 (20 @ 15:30) (132/46 - 192/63)  RR: 18 (20 @ 15:30) (18 - 18)  SpO2: 97% (20 @ 15:30) (94% - 98%)  Wt(kg): --  I&O's Summary      REVIEW OF SYSTEMS: denies fever, chills, SOB, palpitations, chest pain, abdominal pain, nausea, vomitting, diarrhea, constipation, dizziness    MEDICATIONS  (STANDING):  aspirin  chewable 81 milliGRAM(s) Oral daily  atorvastatin 40 milliGRAM(s) Oral at bedtime  cyanocobalamin Injectable 1000 MICROGram(s) IntraMuscular daily  enoxaparin Injectable 40 milliGRAM(s) SubCutaneous daily  ergocalciferol 74651 Unit(s) Oral <User Schedule>  insulin glargine Injectable (LANTUS) 30 Unit(s) SubCutaneous every morning  insulin lispro (HumaLOG) corrective regimen sliding scale   SubCutaneous three times a day before meals  insulin lispro Injectable (HumaLOG) 5 Unit(s) SubCutaneous three times a day before meals  losartan 25 milliGRAM(s) Oral two times a day  metoprolol tartrate 25 milliGRAM(s) Oral two times a day    MEDICATIONS  (PRN):      PHYSICAL EXAM:  GENERAL: NAD, well-groomed, well-developed  HEAD:  Atraumatic, Normocephalic  EYES: EOMI, PERRLA, conjunctiva and sclera clear  ENMT: No tonsillar erythema, exudates, or enlargement; Moist mucous membranes, Good dentition, No lesions  NECK: Supple, No JVD, Normal thyroid  NERVOUS SYSTEM:  Alert & Oriented X3, Good concentration; Motor Strength 5/5 B/L upper and lower extremities; DTRs 2+ intact and symmetric  CHEST/LUNG: Clear to percussion bilaterally; No rales, rhonchi, wheezing, or rubs  HEART: Regular rate and rhythm; No murmurs, rubs, or gallops  ABDOMEN: Soft, Nontender, Nondistended; Bowel sounds present  EXTREMITIES:  2+ Peripheral Pulses, No clubbing, cyanosis, or edema  LYMPH: No lymphadenopathy noted  SKIN: No rashes or lesions  LABS:                        14.8   10.74 )-----------( 232      ( 2020 07:36 )             46.2     02-    136  |  103  |  14  ----------------------------<  264<H>  4.7   |  29  |  0.75    Ca    8.7      2020 07:36  Phos  2.7     02-  Mg     2.1     -    TPro  7.7  /  Alb  3.3<L>  /  TBili  0.7  /  DBili  x   /  AST  38  /  ALT  21  /  AlkPhos  75  02-      Urinalysis Basic - ( 2020 16:48 )    Color: Yellow / Appearance: Clear / S.015 / pH: x  Gluc: x / Ketone: Large  / Bili: Negative / Urobili: Negative   Blood: x / Protein: 100 / Nitrite: Negative   Leuk Esterase: Negative / RBC: 10-25 /HPF / WBC 3-5 /HPF   Sq Epi: x / Non Sq Epi: Moderate /HPF / Bacteria: Few /HPF      CAPILLARY BLOOD GLUCOSE      POCT Blood Glucose.: 275 mg/dL (2020 12:08)  POCT Blood Glucose.: 265 mg/dL (2020 07:47)  POCT Blood Glucose.: 203 mg/dL (2020 22:18)  POCT Blood Glucose.: 310 mg/dL (2020 16:36)        Urinalysis Basic - ( 2020 16:48 )    Color: Yellow / Appearance: Clear / S.015 / pH: x  Gluc: x / Ketone: Large  / Bili: Negative / Urobili: Negative   Blood: x / Protein: 100 / Nitrite: Negative   Leuk Esterase: Negative / RBC: 10-25 /HPF / WBC 3-5 /HPF   Sq Epi: x / Non Sq Epi: Moderate /HPF / Bacteria: Few /HPF

## 2020-02-01 NOTE — PHYSICAL THERAPY INITIAL EVALUATION ADULT - GENERAL OBSERVATIONS, REHAB EVAL
Pt seen sitting in chair with telemetry, (+) deformity of right hand (congenital?). Pt was cooperative during assessment

## 2020-02-01 NOTE — PROGRESS NOTE ADULT - SUBJECTIVE AND OBJECTIVE BOX
Interval Events:    tolerating po intake  poc glucose elevated fasting and prandial    Allergies    penicillin (Unknown)    Intolerances      Endocrine/Metabolic Medications:  atorvastatin 40 milliGRAM(s) Oral at bedtime  insulin glargine Injectable (LANTUS) 30 Unit(s) SubCutaneous every morning  insulin lispro (HumaLOG) corrective regimen sliding scale   SubCutaneous three times a day before meals      Vital Signs Last 24 Hrs  T(C): 36.6 (01 Feb 2020 07:36), Max: 39.6 (01 Feb 2020 01:42)  T(F): 97.8 (01 Feb 2020 07:36), Max: 103.2 (01 Feb 2020 01:42)  HR: 62 (01 Feb 2020 07:36) (62 - 88)  BP: 162/55 (01 Feb 2020 07:36) (119/46 - 192/63)  BP(mean): --  RR: 18 (01 Feb 2020 07:36) (18 - 18)  SpO2: 98% (01 Feb 2020 07:36) (94% - 98%)      PHYSICAL EXAM  All physical exam findings normal, except those marked:  Constitutional:    NC/AT:    HEENT:    Neck:  No JVD  Respiratory:  Clear without rales or rhonchi    Cardiovascular:  RR   Gastrointestinal: Soft , non tender    Extremities: without cyanosis    Neurological:  Oriented   x  3          LABS                        14.8   10.74 )-----------( 232      ( 01 Feb 2020 07:36 )             46.2                               136    |  103    |  14                  Calcium: 8.7   / iCa: x      (02-01 @ 07:36)    ----------------------------<  264       Magnesium: 2.1                              4.7     |  29     |  0.75             Phosphorous: 2.7      TPro  7.7    /  Alb  3.3    /  TBili  0.7    /  DBili  x      /  AST  38     /  ALT  21     /  AlkPhos  75     01 Feb 2020 07:36    CAPILLARY BLOOD GLUCOSE      POCT Blood Glucose.: 265 mg/dL (01 Feb 2020 07:47)  POCT Blood Glucose.: 203 mg/dL (31 Jan 2020 22:18)  POCT Blood Glucose.: 310 mg/dL (31 Jan 2020 16:36)        Assesment/plan      80 yo female with multiple comorbidities including h/o DM type 2, HTN, HLD admitted with syncope    endocrinology consulted for glycemic management    DM type 2  uncontrolled  complicated by hyperglycemia  HBA1C: 11.5  home regimen:  glipizide 10mg daily  metformin 1000mg bid    recommendations:  elevated fasting and prandial  basal/bolus started today    - continue insulin lantus 30 units daily in am, first dose now 1/31/20  - start insulin lispro 5 units pre meals  - continue moderate dose sliding sca  - reassess based on requirements  - goal inpatient glucose range: 140-180mg/dl    HLD  on simvastatin 20mg at home- low dose  goal LDL <70,  LFTs wnl  - recommend switch from simvastatin to atorvastatin 40mg hs   (moderate intensity statin)  - repeat fasting lipid profile in 8-12 weeks to assess response  - TGs mildly elevated- would expect improvement with low fat/diabetic diet and diabetes control    HTN  elevated  home anti HTNs to be resumed  per primary team    Discussed with primary team  Please call  Endocrine- Dr Syl Olivas as needed Interval Events:    tolerating po intake  poc glucose elevated fasting and prandial    Allergies    penicillin (Unknown)    Intolerances      Endocrine/Metabolic Medications:  atorvastatin 40 milliGRAM(s) Oral at bedtime  insulin glargine Injectable (LANTUS) 30 Unit(s) SubCutaneous every morning  insulin lispro (HumaLOG) corrective regimen sliding scale   SubCutaneous three times a day before meals      Vital Signs Last 24 Hrs  T(C): 36.6 (01 Feb 2020 07:36), Max: 39.6 (01 Feb 2020 01:42)  T(F): 97.8 (01 Feb 2020 07:36), Max: 103.2 (01 Feb 2020 01:42)  HR: 62 (01 Feb 2020 07:36) (62 - 88)  BP: 162/55 (01 Feb 2020 07:36) (119/46 - 192/63)  BP(mean): --  RR: 18 (01 Feb 2020 07:36) (18 - 18)  SpO2: 98% (01 Feb 2020 07:36) (94% - 98%)      PHYSICAL EXAM  All physical exam findings normal, except those marked:  Constitutional:    NC/AT:    HEENT:    Neck:  No JVD  Respiratory:  Clear without rales or rhonchi    Cardiovascular:  RR   Gastrointestinal: Soft , non tender    Extremities: without cyanosis    Neurological:  Oriented   x  3          LABS                        14.8   10.74 )-----------( 232      ( 01 Feb 2020 07:36 )             46.2                               136    |  103    |  14                  Calcium: 8.7   / iCa: x      (02-01 @ 07:36)    ----------------------------<  264       Magnesium: 2.1                              4.7     |  29     |  0.75             Phosphorous: 2.7      TPro  7.7    /  Alb  3.3    /  TBili  0.7    /  DBili  x      /  AST  38     /  ALT  21     /  AlkPhos  75     01 Feb 2020 07:36    CAPILLARY BLOOD GLUCOSE      POCT Blood Glucose.: 265 mg/dL (01 Feb 2020 07:47)  POCT Blood Glucose.: 203 mg/dL (31 Jan 2020 22:18)  POCT Blood Glucose.: 310 mg/dL (31 Jan 2020 16:36)        Assesment/plan      78 yo female with multiple comorbidities including h/o DM type 2, HTN, HLD admitted with syncope    endocrinology consulted for glycemic management    DM type 2  uncontrolled  complicated by hyperglycemia  HBA1C: 11.5  home regimen:  glipizide 10mg daily  metformin 1000mg bid    recommendations:  elevated fasting and prandial  basal/bolus started today    - continue insulin lantus 30 units daily in am, first dose now 1/31/20  - start insulin lispro 5 units pre meals  - continue moderate dose sliding sca  - reassess based on requirements  - goal inpatient glucose range: 140-180mg/dl    tentative discharge recommendations:  stop glipizide on discharge  continue metformin 1000mg bid if eGFR remains above 45 prior to discharge  discussed insulin use with patient since HBA1C >9%, she lives with her  who would be able to help her with injections  (b/l hand deformities)- would do twice daily insulin injections for ease of use  insulin humulin 70/30: 20 units in am prior to breakfast and 10 units in pm prior to dinner  patient and family members0 /daughter- need insulin teaching    HLD  on simvastatin 20mg at home- low dose  goal LDL <70,  LFTs wnl  - recommend switch from simvastatin to atorvastatin 40mg hs   (moderate intensity statin)  - repeat fasting lipid profile in 8-12 weeks to assess response  - TGs mildly elevated- would expect improvement with low fat/diabetic diet and diabetes control    HTN  elevated  home anti HTNs to be resumed  per primary team    Discussed with primary team  Please call  Endocrine- Dr Syl Olivas as needed

## 2020-02-02 NOTE — CHART NOTE - NSCHARTNOTEFT_GEN_A_CORE
Was paged about Patient's agitation. She was noted  to be confused and  was walking towards the hallway with her walker. She did not wanted to return to bed as the bed was uncomfortable. Pt refused using the telemetry box and also refused IV line insertion.

## 2020-02-02 NOTE — PROGRESS NOTE ADULT - SUBJECTIVE AND OBJECTIVE BOX
CHIEF COMPLAINT:Patient is a 79y old  Female who presents with a chief complaint of syncope .Pt appears comfortable.    	  REVIEW OF SYSTEMS:  CONSTITUTIONAL: No fever, weight loss, or fatigue  EYES: No eye pain, visual disturbances, or discharge  ENT:  No difficulty hearing, tinnitus, vertigo; No sinus or throat pain  NECK: No pain or stiffness  RESPIRATORY: No cough, wheezing, chills or hemoptysis; No Shortness of Breath  CARDIOVASCULAR: No chest pain, palpitations, passing out, dizziness, or leg swelling  GASTROINTESTINAL: No abdominal or epigastric pain. No nausea, vomiting, or hematemesis; No diarrhea or constipation. No melena or hematochezia.  GENITOURINARY: No dysuria, frequency, hematuria, or incontinence  NEUROLOGICAL: No headaches, memory loss, loss of strength, numbness, or tremors  SKIN: No itching, burning, rashes, or lesions   LYMPH Nodes: No enlarged glands  ENDOCRINE: No heat or cold intolerance; No hair loss  MUSCULOSKELETAL: No joint pain or swelling; No muscle, back, or extremity pain  PSYCHIATRIC: No depression, anxiety, mood swings, or difficulty sleeping  HEME/LYMPH: No easy bruising, or bleeding gums  ALLERGY AND IMMUNOLOGIC: No hives or eczema	      PHYSICAL EXAM:  T(C): 36.6 (02-02-20 @ 07:51), Max: 36.8 (02-01-20 @ 19:19)  HR: 65 (02-02-20 @ 07:51) (65 - 85)  BP: 126/41 (02-02-20 @ 07:51) (126/41 - 170/49)  RR: 18 (02-02-20 @ 07:51) (18 - 18)  SpO2: 95% (02-02-20 @ 07:51) (94% - 97%)  Wt(kg): --  I&O's Summary      Appearance: Normal	  HEENT:   Normal oral mucosa, PERRL, EOMI	  Lymphatic: No lymphadenopathy  Cardiovascular: Normal S1 S2, No JVD, No murmurs, No edema  Respiratory: Lungs clear to auscultation	  Psychiatry: A & O x 3, Mood & affect appropriate  Gastrointestinal:  Soft, Non-tender, + BS	  Skin: No rashes, No ecchymoses, No cyanosis	  Neurologic: Non-focal  Extremities: Normal range of motion, No clubbing, cyanosis or edema  Vascular: Peripheral pulses palpable 2+ bilaterally    MEDICATIONS  (STANDING):  aspirin  chewable 81 milliGRAM(s) Oral daily  atorvastatin 40 milliGRAM(s) Oral at bedtime  cyanocobalamin Injectable 1000 MICROGram(s) IntraMuscular daily  enoxaparin Injectable 40 milliGRAM(s) SubCutaneous daily  ergocalciferol 64498 Unit(s) Oral <User Schedule>  insulin glargine Injectable (LANTUS) 30 Unit(s) SubCutaneous every morning  insulin lispro (HumaLOG) corrective regimen sliding scale   SubCutaneous three times a day before meals  insulin lispro Injectable (HumaLOG) 5 Unit(s) SubCutaneous three times a day before meals  losartan 25 milliGRAM(s) Oral two times a day  metoprolol tartrate 25 milliGRAM(s) Oral two times a day      TELEMETRY: 	nsr      	  LABS:	 	                        15.7   11.17 )-----------( 262      ( 02 Feb 2020 06:24 )             48.9     02-02    138  |  105  |  17  ----------------------------<  219<H>  4.0   |  30  |  0.81    Ca    8.9      02 Feb 2020 06:24  Phos  3.3     02-02  Mg     2.1     02-02    TPro  7.6  /  Alb  3.4<L>  /  TBili  0.6  /  DBili  x   /  AST  21  /  ALT  19  /  AlkPhos  78  02-02      Lipid Profile: Cholesterol 269    HDL 54      HgA1c: Hemoglobin A1C, Whole Blood: 11.5 % (01-31 @ 09:16)    TSH: Thyroid Stimulating Hormone, Serum: 1.58 uU/mL (02-01 @ 07:36)  Thyroid Stimulating Hormone, Serum: 0.75 uU/mL (01-31 @ 06:07)      	    OBSERVATIONS:  Mitral Valve: Normalmitral valve. Mild mitral  regurgitation.  Aortic Root: Aortic Root: 3.4 cm.    Aortic Valve: Normal trileaflet aortic valve.  Left Atrium: Severely dilated left atrium.  LA volume index  = 69 cc/m2.  Left Ventricle: Normal Left Ventricular Systolic Function,  (EF = 55 to 60%) No regional wall motion abnormalities.  Mild concentric left ventricular hypertrophy. Grade I  diastolic dysfunction (Impaired relaxation).  Right Heart: Normal right atrium. Normal right ventricular  size and function. There is trace tricuspid regurgitation.  There is mild pulmonic regurgitation.  Pericardium/PleuraNormal pericardium with no pericardial  effusion.  Hemodynamic: Unable to estimate RVSP.

## 2020-02-02 NOTE — PROGRESS NOTE ADULT - SUBJECTIVE AND OBJECTIVE BOX
PGY 1 Note discussed with supervising resident and primary attending    Patient is a 79y old  Female who presents with a chief complaint of syncope (01 Feb 2020 16:22)      INTERVAL HPI/OVERNIGHT EVENTS: Patient seen and examined at the bedside. Patient was agitated and confused overnight.     MEDICATIONS  (STANDING):  aspirin  chewable 81 milliGRAM(s) Oral daily  atorvastatin 40 milliGRAM(s) Oral at bedtime  cyanocobalamin Injectable 1000 MICROGram(s) IntraMuscular daily  enoxaparin Injectable 40 milliGRAM(s) SubCutaneous daily  ergocalciferol 49526 Unit(s) Oral <User Schedule>  insulin glargine Injectable (LANTUS) 30 Unit(s) SubCutaneous every morning  insulin lispro (HumaLOG) corrective regimen sliding scale   SubCutaneous three times a day before meals  insulin lispro Injectable (HumaLOG) 5 Unit(s) SubCutaneous three times a day before meals  losartan 25 milliGRAM(s) Oral two times a day  metoprolol tartrate 25 milliGRAM(s) Oral two times a day    MEDICATIONS  (PRN):      __________________________________________________  REVIEW OF SYSTEMS:    CONSTITUTIONAL: No fever,   EYES: no acute visual disturbances  NECK: No pain or stiffness  RESPIRATORY: No cough; No shortness of breath  CARDIOVASCULAR: No chest pain, no palpitations  GASTROINTESTINAL: No pain. No nausea or vomiting; No diarrhea   NEUROLOGICAL: No headache or numbness, no tremors  MUSCULOSKELETAL: No joint pain, no muscle pain  GENITOURINARY: no dysuria, no frequency, no hesitancy  PSYCHIATRY: no depression , no anxiety  ALL OTHER  ROS negative        Vital Signs Last 24 Hrs  T(C): 36.6 (02 Feb 2020 07:51), Max: 36.8 (01 Feb 2020 19:19)  T(F): 97.8 (02 Feb 2020 07:51), Max: 98.2 (01 Feb 2020 19:19)  HR: 65 (02 Feb 2020 07:51) (65 - 85)  BP: 126/41 (02 Feb 2020 07:51) (126/41 - 170/49)  BP(mean): --  RR: 18 (02 Feb 2020 07:51) (18 - 18)  SpO2: 95% (02 Feb 2020 07:51) (94% - 97%)    ________________________________________________  PHYSICAL EXAM:  GENERAL: NAD  HEENT: Normocephalic;  conjunctivae and sclerae clear; moist mucous membranes;   NECK : supple  CHEST/LUNG: Clear to auscultation bilaterally with good air entry   HEART: S1 S2  regular; no murmurs, gallops or rubs  ABDOMEN: Soft, Nontender, Nondistended; Bowel sounds present  EXTREMITIES: no cyanosis; no edema; hand deformities bilaterally  SKIN: warm and dry; no rash  NERVOUS SYSTEM:  Awake and alert; Oriented  to place, person and time ; no new deficits    _________________________________________________  LABS:                        15.7   11.17 )-----------( 262      ( 02 Feb 2020 06:24 )             48.9     02-02    138  |  105  |  17  ----------------------------<  219<H>  4.0   |  30  |  0.81    Ca    8.9      02 Feb 2020 06:24  Phos  3.3     02-02  Mg     2.1     02-02    TPro  7.6  /  Alb  3.4<L>  /  TBili  0.6  /  DBili  x   /  AST  21  /  ALT  19  /  AlkPhos  78  02-02        CAPILLARY BLOOD GLUCOSE      POCT Blood Glucose.: 208 mg/dL (02 Feb 2020 07:49)  POCT Blood Glucose.: 98 mg/dL (01 Feb 2020 21:19)  POCT Blood Glucose.: 170 mg/dL (01 Feb 2020 16:40)  POCT Blood Glucose.: 275 mg/dL (01 Feb 2020 12:08)        RADIOLOGY & ADDITIONAL TESTS:    < from: TTE with Doppler (w/Cont) (01.31.20 @ 07:22) >  PROCEDURE: Transthoracic echocardiogram with 2-D, M-Mode  and complete spectral and color flow Doppler.  Verbal consent was obtained for injection of echo contrast  following a discussion of risks and benefits. Following 1.5  cc  intravenous injection of contrast, harmonic imaging was  performed.  INDICATION: Syncope and Collapse (R55)  HISTORY:  ------------------------------------------------------------------------  DIMENSIONS:  Dimensions:     Normal Values:  LA:     3.9 cm    2.0 - 4.0 cm  Ao:     3.4 cm    2.0 - 3.8 cm  SEPTUM: 1.3 cm    0.6 - 1.2 cm  PWT:    1.3 cm    0.6 - 1.1 cm  LVIDd:  4.5 cm    3.0 - 5.6 cm  LVIDs:  3.0 cm    1.8 - 4.0 cm      Derived Variables:  LVMI: 220 g/m2  RWT: 0.57  Ejection Fraction Visual Estimate: 55-60 %    ------------------------------------------------------------------------  OBSERVATIONS:  Mitral Valve: Normalmitral valve. Mild mitral  regurgitation.  Aortic Root: Aortic Root: 3.4 cm.    Aortic Valve: Normal trileaflet aortic valve.  Left Atrium: Severely dilated left atrium.  LA volume index  = 69 cc/m2.  Left Ventricle: Normal Left Ventricular Systolic Function,  (EF = 55 to 60%) No regional wall motion abnormalities.  Mild concentric left ventricular hypertrophy. Grade I  diastolic dysfunction (Impaired relaxation).  Right Heart: Normal right atrium. Normal right ventricular  size and function. There is trace tricuspid regurgitation.  There is mild pulmonic regurgitation.  Pericardium/PleuraNormal pericardium with no pericardial  effusion.  Hemodynamic: Unable to estimate RVSP.  ------------------------------------------------------------------------  CONCLUSIONS:  1. Normal mitral valve. Mild mitral regurgitation.  2. Normal trileaflet aortic valve.  3. Aortic Root: 3.4 cm.    4. Severely dilated left atrium.  LA volume index = 69  cc/m2.  5. Mild concentric left ventricular hypertrophy.  6. Normal Left Ventricular Systolic Function,  (EF = 55 to  60%) No regional wall motion abnormalities.  7. Grade I diastolic dysfunction (Impaired relaxation).  8. Normal right atrium.  9. Normal right ventricular size and function.  10. Unable to estimate RVSP.  11. There is trace tricuspid regurgitation.  12. There is mild pulmonic regurgitation.  13. Normal pericardium with no pericardial effusion.      < end of copied text >      Imaging Personally Reviewed:  YES/NO    Consultant(s) Notes Reviewed:   YES/ No    Care Discussed with Consultants :     Plan of care was discussed with patient and /or primary care giver; all questions and concerns were addressed and care was aligned with patient's wishes.

## 2020-02-03 NOTE — PROGRESS NOTE ADULT - SUBJECTIVE AND OBJECTIVE BOX
PGY 1 Note discussed with supervising resident and primary attending    Patient is a 79y old  Female who presents with a chief complaint of syncope (02 Feb 2020 10:21)      INTERVAL HPI/OVERNIGHT EVENTS: Patient seen and examined at the bedside.     MEDICATIONS  (STANDING):  aspirin  chewable 81 milliGRAM(s) Oral daily  atorvastatin 40 milliGRAM(s) Oral at bedtime  cyanocobalamin Injectable 1000 MICROGram(s) IntraMuscular daily  enoxaparin Injectable 40 milliGRAM(s) SubCutaneous daily  ergocalciferol 27585 Unit(s) Oral <User Schedule>  insulin glargine Injectable (LANTUS) 30 Unit(s) SubCutaneous every morning  insulin lispro (HumaLOG) corrective regimen sliding scale   SubCutaneous three times a day before meals  insulin lispro Injectable (HumaLOG) 5 Unit(s) SubCutaneous three times a day before meals  losartan 25 milliGRAM(s) Oral two times a day  metoprolol tartrate 25 milliGRAM(s) Oral two times a day    MEDICATIONS  (PRN):      __________________________________________________  REVIEW OF SYSTEMS:    CONSTITUTIONAL: No fever,   EYES: no acute visual disturbances  NECK: No pain or stiffness  RESPIRATORY: No cough; No shortness of breath  CARDIOVASCULAR: No chest pain, no palpitations  GASTROINTESTINAL: No pain. No nausea or vomiting; No diarrhea   NEUROLOGICAL: No headache or numbness, no tremors  MUSCULOSKELETAL: No joint pain, no muscle pain  GENITOURINARY: no dysuria, no frequency, no hesitancy  PSYCHIATRY: no depression , no anxiety  ALL OTHER  ROS negative        Vital Signs Last 24 Hrs  T(C): 36.2 (03 Feb 2020 07:34), Max: 36.9 (02 Feb 2020 15:16)  T(F): 97.1 (03 Feb 2020 07:34), Max: 98.5 (02 Feb 2020 15:16)  HR: 58 (03 Feb 2020 07:34) (58 - 74)  BP: 110/55 (03 Feb 2020 07:34) (110/55 - 158/57)  BP(mean): --  RR: 18 (03 Feb 2020 07:34) (16 - 18)  SpO2: 96% (03 Feb 2020 07:34) (94% - 97%)    ________________________________________________  PHYSICAL EXAM:  GENERAL: NAD  HEENT: Normocephalic;  conjunctivae and sclerae clear; moist mucous membranes;   NECK : supple  CHEST/LUNG: Clear to auscultation bilaterally with good air entry   HEART: S1 S2  regular; no murmurs, gallops or rubs  ABDOMEN: Soft, Nontender, Nondistended; Bowel sounds present  EXTREMITIES: no cyanosis; no edema; no calf tenderness  SKIN: warm and dry; no rash  NERVOUS SYSTEM:  Awake and alert; Oriented  to place, person and time ; no new deficits    _________________________________________________  LABS:                        15.2   10.95 )-----------( 265      ( 03 Feb 2020 06:33 )             47.9     02-03    141  |  104  |  16  ----------------------------<  175<H>  3.8   |  30  |  0.65    Ca    9.0      03 Feb 2020 06:33  Phos  3.4     02-03  Mg     2.0     02-03    TPro  7.2  /  Alb  3.1<L>  /  TBili  0.6  /  DBili  x   /  AST  15  /  ALT  20  /  AlkPhos  70  02-03        CAPILLARY BLOOD GLUCOSE      POCT Blood Glucose.: 165 mg/dL (02 Feb 2020 21:02)  POCT Blood Glucose.: 188 mg/dL (02 Feb 2020 16:39)  POCT Blood Glucose.: 181 mg/dL (02 Feb 2020 12:36)  POCT Blood Glucose.: 290 mg/dL (02 Feb 2020 09:14)        RADIOLOGY & ADDITIONAL TESTS:    Imaging Personally Reviewed:  YES/NO    Consultant(s) Notes Reviewed:   YES/ No    Care Discussed with Consultants :     Plan of care was discussed with patient and /or primary care giver; all questions and concerns were addressed and care was aligned with patient's wishes. PGY 1 Note discussed with supervising resident and primary attending    Patient is a 79y old  Female who presents with a chief complaint of syncope (02 Feb 2020 10:21)      INTERVAL HPI/OVERNIGHT EVENTS: Patient seen and examined at the bedside. Patient for stress test today.      MEDICATIONS  (STANDING):  aspirin  chewable 81 milliGRAM(s) Oral daily  atorvastatin 40 milliGRAM(s) Oral at bedtime  cyanocobalamin Injectable 1000 MICROGram(s) IntraMuscular daily  enoxaparin Injectable 40 milliGRAM(s) SubCutaneous daily  ergocalciferol 91630 Unit(s) Oral <User Schedule>  insulin glargine Injectable (LANTUS) 30 Unit(s) SubCutaneous every morning  insulin lispro (HumaLOG) corrective regimen sliding scale   SubCutaneous three times a day before meals  insulin lispro Injectable (HumaLOG) 5 Unit(s) SubCutaneous three times a day before meals  losartan 25 milliGRAM(s) Oral two times a day  metoprolol tartrate 25 milliGRAM(s) Oral two times a day    MEDICATIONS  (PRN):      __________________________________________________  REVIEW OF SYSTEMS:    CONSTITUTIONAL: No fever,   EYES: no acute visual disturbances  NECK: No pain or stiffness  RESPIRATORY: No cough; No shortness of breath  CARDIOVASCULAR: No chest pain, no palpitations  GASTROINTESTINAL: No pain. No nausea or vomiting; No diarrhea   NEUROLOGICAL: No headache or numbness, no tremors  MUSCULOSKELETAL: No joint pain, no muscle pain  GENITOURINARY: no dysuria, no frequency, no hesitancy  PSYCHIATRY: no depression , no anxiety  ALL OTHER  ROS negative        Vital Signs Last 24 Hrs  T(C): 36.2 (03 Feb 2020 07:34), Max: 36.9 (02 Feb 2020 15:16)  T(F): 97.1 (03 Feb 2020 07:34), Max: 98.5 (02 Feb 2020 15:16)  HR: 58 (03 Feb 2020 07:34) (58 - 74)  BP: 110/55 (03 Feb 2020 07:34) (110/55 - 158/57)  BP(mean): --  RR: 18 (03 Feb 2020 07:34) (16 - 18)  SpO2: 96% (03 Feb 2020 07:34) (94% - 97%)    ________________________________________________  PHYSICAL EXAMINATION:    GENERAL: NAD  HEENT: Normocephalic;  conjunctivae and sclerae clear; moist mucous membranes;   NECK : supple  CHEST/LUNG: Clear to auscultation bilaterally with good air entry   HEART: S1 S2  regular; no murmurs, gallops or rubs  ABDOMEN: Soft, Nontender, Nondistended; Bowel sounds present  EXTREMITIES: no cyanosis; no edema; hand deformities bilaterally  SKIN: warm and dry; no rash  NERVOUS SYSTEM:  Awake and alert; Oriented  to place, person and time ; no new deficits  _________________________________________________  LABS:                        15.2   10.95 )-----------( 265      ( 03 Feb 2020 06:33 )             47.9     02-03    141  |  104  |  16  ----------------------------<  175<H>  3.8   |  30  |  0.65    Ca    9.0      03 Feb 2020 06:33  Phos  3.4     02-03  Mg     2.0     02-03    TPro  7.2  /  Alb  3.1<L>  /  TBili  0.6  /  DBili  x   /  AST  15  /  ALT  20  /  AlkPhos  70  02-03        CAPILLARY BLOOD GLUCOSE      POCT Blood Glucose.: 165 mg/dL (02 Feb 2020 21:02)  POCT Blood Glucose.: 188 mg/dL (02 Feb 2020 16:39)  POCT Blood Glucose.: 181 mg/dL (02 Feb 2020 12:36)  POCT Blood Glucose.: 290 mg/dL (02 Feb 2020 09:14)        RADIOLOGY & ADDITIONAL TESTS:    < from: Nuclear Stress Test-Pharmacologic (02.03.20 @ 08:56) >  TYPE OF TEST: Rest/Stress Pharmacologic  INDICATION: Abnormal electrocardiogram [ECG] [EKG]  (R94.31)  HEIGHT: 163 cm  WEIGHT: 90.1 kg  ------------------------------------------------------------------------  HISTORY:  CARDIAC HISTORY: 79 years old female with, Hypertension  OTHER HISTORY: Diabetes Mellitus  RISK FACTORS: Diabetes, Elevated Cholesterol, Hypertension  MEDICATIONS: Asprin, Lovenox, Lopressor, Insulin, Lipitor,  Vitamin D, Losartan  ------------------------------------------------------------------------    BASELINE ELECTROCARDIOGRAM:  Rhythm: Normal Sinus Rhythm withq waves anterior leads    ------------------------------------------------------------------------    HEMODYNAMIC PARAMETERS:                                      HR      BP  Baseline  Pre-Injection             57  136/70  00:00     Inject Regadenoson         0  00:30     Post Injection             0  01:00     Post Injection            70  130/48  02:00     Post Injection            79  150/51  05:00     Recovery          77  164/53  06:00     Recovery                  76  149/50  07:00     Recovery                  73  142/52  10:00     Recovery                  73  136/51  ------------------------------------------------------------------------    Agent: Regadenoson 0.4 mg/5 ml NS. injected over 10 sec.  HR: Baseline HR: 57 bpm   Peak HR: 79 bpm (56% of MPHR)  MPHR: 142 bpm   85% of MPHR: 121 bpm  BP: Baseline BP: 136/70 mmHg   Peak BP: 150/51 mmHg   Peak  RPP: 72435 (Rate Pressure Product)  HR IsotopeInjected: 57 bpm (Tc 99m Tetrofosmin)  59 bpm (Tc 99m Tetrofosmin)  Last Caffeine intake: 12 hrs  Terminated: Completion of protocol  ------------------------------------------------------------------------    SYMPTOMS/FINDINGS:  Symptoms: Flushing  Chest pain: No chest pain with administration of  Regadenoson  ------------------------------------------------------------------------    ECG ABNORMALITIES DURING/AFTER STRESS:   Abnormalities: None  ------------------------------------------------------------------------    NEW ARRHYTHMIAS DEVELOPED DURING/AFTER STRESS:  None  ------------------------------------------------------------------------    STRESS TEST IMPRESSIONS:  Negative ECG evidence of ischemia after IV of Lexiscan.  ------------------------------------------------------------------------    PROCEDURE:  10.1 mCi of Tc 99m Tetrofosmin were injected intravenously  at rest. Approximately 45 minutes later, tomographic  images were obtained in a 180 degree arc from right  anterioroblique to left anterior oblique with 64 stops.  At a separate time, 30.1 mCi of Tc 99m Tetrofosmin were  injected intravenously during stress protocol.  Approximately 45 minute(s) later, tomographic images were  obtained in a 180 degree arc from right anterior oblique  to left anterior oblique with 64 stops. The tomographic  slices were reconstructed in 3 orthogonal planes (short  axis, horizontal long axis and vertical long axis).  Interpretation was performed both by visual and  quantitative analysis.    ------------------------------------------------------------------------    NUCLEAR FINDINGS:  Review of raw data shows: The study is of good technical  quality.  The left ventricle was normal in size. Normal myocardial  perfusion scan, with no evidence of infarction or  inducible ischemia.  ------------------------------------------------------------------------      GATED ANALYSIS:  Gated wall motion analysis is performed, and shows normal  wall motion with post stress LVEF of 64%.  ------------------------------------------------------------------------      LV/RV OBSERVATION:  Normal LV ejection fraction., No segmental wall motion  abnormality.  ------------------------------------------------------------------------    IMPRESSIONS:Normal Study  * Negative ECG evidence of ischemia after IV of Lexiscan.  * Review of raw data shows: The study is of good technical  quality.  * The left ventricle was normal in size. Normal myocardial  perfusion scan, with no evidence of infarction or  inducible ischemia.  * Gated wall motion analysis is performed, and shows  normal wall motion with post stress LVEF of 64%.    < end of copied text >      Imaging Personally Reviewed:  YES/NO    Consultant(s) Notes Reviewed:   YES/ No    Care Discussed with Consultants :     Plan of care was discussed with patient and /or primary care giver; all questions and concerns were addressed and care was aligned with patient's wishes.

## 2020-02-03 NOTE — PROGRESS NOTE ADULT - SUBJECTIVE AND OBJECTIVE BOX
HPI:  79 years old female from home, lives with her , with PMHx of HTN, DM, hyperlipidemia presenting s/p fall per ems, patient found on ground, unable to get up. Patient endorses she is unsure how she end up on the ground or at what time it happened. Patient was confused after the episode per .   Patient denies headache, nausea, vomiting, chest pain, shortness of breath. Patient also endorses knee pain on left side.  Patient is admitted for syncope. EKG is negative for ischemia. Orthostatic vitals is negative. Patient is being worked up for syncope. (31 Jan 2020 00:30)      Patient is a 79y old  Female who presents with a chief complaint of syncope (03 Feb 2020 12:44)      INTERVAL HPI/OVERNIGHT EVENTS:  T(C): 36.7 (02-03-20 @ 11:23), Max: 36.9 (02-02-20 @ 15:16)  HR: 67 (02-03-20 @ 11:23) (58 - 70)  BP: 148/50 (02-03-20 @ 11:23) (110/55 - 158/57)  RR: 18 (02-03-20 @ 11:23) (16 - 18)  SpO2: 95% (02-03-20 @ 11:23) (94% - 97%)  Wt(kg): --  I&O's Summary      REVIEW OF SYSTEMS: denies fever, chills, SOB, palpitations, chest pain, abdominal pain, nausea, vomitting, diarrhea, constipation, dizziness    MEDICATIONS  (STANDING):  aspirin  chewable 81 milliGRAM(s) Oral daily  atorvastatin 40 milliGRAM(s) Oral at bedtime  cyanocobalamin Injectable 1000 MICROGram(s) IntraMuscular daily  enoxaparin Injectable 40 milliGRAM(s) SubCutaneous daily  ergocalciferol 85200 Unit(s) Oral <User Schedule>  insulin glargine Injectable (LANTUS) 30 Unit(s) SubCutaneous every morning  insulin lispro (HumaLOG) corrective regimen sliding scale   SubCutaneous three times a day before meals  insulin lispro Injectable (HumaLOG) 5 Unit(s) SubCutaneous three times a day before meals  losartan 25 milliGRAM(s) Oral two times a day  metoprolol tartrate 25 milliGRAM(s) Oral two times a day    MEDICATIONS  (PRN):      PHYSICAL EXAM:  GENERAL: NAD, well-groomed, well-developed  HEAD:  Atraumatic, Normocephalic  EYES: EOMI, PERRLA, conjunctiva and sclera clear  ENMT: No tonsillar erythema, exudates, or enlargement; Moist mucous membranes, Good dentition, No lesions  NECK: Supple, No JVD, Normal thyroid  NERVOUS SYSTEM:  Alert & Oriented X3, Good concentration; Motor Strength 5/5 B/L upper and lower extremities; DTRs 2+ intact and symmetric  CHEST/LUNG: Clear to percussion bilaterally; No rales, rhonchi, wheezing, or rubs  HEART: Regular rate and rhythm; No murmurs, rubs, or gallops  ABDOMEN: Soft, Nontender, Nondistended; Bowel sounds present  EXTREMITIES:  2+ Peripheral Pulses, No clubbing, cyanosis, or edema  LYMPH: No lymphadenopathy noted  SKIN: No rashes or lesions  LABS:                        15.2   10.95 )-----------( 265      ( 03 Feb 2020 06:33 )             47.9     02-03    141  |  104  |  16  ----------------------------<  175<H>  3.8   |  30  |  0.65    Ca    9.0      03 Feb 2020 06:33  Phos  3.4     02-03  Mg     2.0     02-03    TPro  7.2  /  Alb  3.1<L>  /  TBili  0.6  /  DBili  x   /  AST  15  /  ALT  20  /  AlkPhos  70  02-03        CAPILLARY BLOOD GLUCOSE      POCT Blood Glucose.: 261 mg/dL (03 Feb 2020 10:52)  POCT Blood Glucose.: 165 mg/dL (02 Feb 2020 21:02)  POCT Blood Glucose.: 188 mg/dL (02 Feb 2020 16:39)

## 2020-02-03 NOTE — PROGRESS NOTE ADULT - SUBJECTIVE AND OBJECTIVE BOX
CHIEF COMPLAINT:Patient is a 79y old  Female who presents with a chief complaint of syncope .Pt appears comfortable.    	  REVIEW OF SYSTEMS:  CONSTITUTIONAL: No fever, weight loss, or fatigue  EYES: No eye pain, visual disturbances, or discharge  ENT:  No difficulty hearing, tinnitus, vertigo; No sinus or throat pain  NECK: No pain or stiffness  RESPIRATORY: No cough, wheezing, chills or hemoptysis; No Shortness of Breath  CARDIOVASCULAR: No chest pain, palpitations, passing out, dizziness, or leg swelling  GASTROINTESTINAL: No abdominal or epigastric pain. No nausea, vomiting, or hematemesis; No diarrhea or constipation. No melena or hematochezia.  GENITOURINARY: No dysuria, frequency, hematuria, or incontinence  NEUROLOGICAL: No headaches, memory loss, loss of strength, numbness, or tremors  SKIN: No itching, burning, rashes, or lesions   LYMPH Nodes: No enlarged glands  ENDOCRINE: No heat or cold intolerance; No hair loss  MUSCULOSKELETAL: No joint pain or swelling; No muscle, back, or extremity pain  PSYCHIATRIC: No depression, anxiety, mood swings, or difficulty sleeping  HEME/LYMPH: No easy bruising, or bleeding gums  ALLERGY AND IMMUNOLOGIC: No hives or eczema	      PHYSICAL EXAM:  T(C): 36.2 (02-03-20 @ 07:34), Max: 36.9 (02-02-20 @ 15:16)  HR: 58 (02-03-20 @ 07:34) (58 - 74)  BP: 110/55 (02-03-20 @ 07:34) (110/55 - 158/57)  RR: 18 (02-03-20 @ 07:34) (16 - 18)  SpO2: 96% (02-03-20 @ 07:34) (94% - 97%)      Appearance: Normal	  HEENT:   Normal oral mucosa, PERRL, EOMI	  Lymphatic: No lymphadenopathy  Cardiovascular: Normal S1 S2, No JVD, No murmurs, No edema  Respiratory: Lungs clear to auscultation	  Psychiatry: A & O x 3, Mood & affect appropriate  Gastrointestinal:  Soft, Non-tender, + BS	  Skin: No rashes, No ecchymoses, No cyanosis	  Neurologic: Non-focal  Extremities: Normal range of motion, No clubbing, cyanosis or edema  Vascular: Peripheral pulses palpable 2+ bilaterally    MEDICATIONS  (STANDING):  aspirin  chewable 81 milliGRAM(s) Oral daily  atorvastatin 40 milliGRAM(s) Oral at bedtime  cyanocobalamin Injectable 1000 MICROGram(s) IntraMuscular daily  enoxaparin Injectable 40 milliGRAM(s) SubCutaneous daily  ergocalciferol 45433 Unit(s) Oral <User Schedule>  insulin glargine Injectable (LANTUS) 30 Unit(s) SubCutaneous every morning  insulin lispro (HumaLOG) corrective regimen sliding scale   SubCutaneous three times a day before meals  insulin lispro Injectable (HumaLOG) 5 Unit(s) SubCutaneous three times a day before meals  losartan 25 milliGRAM(s) Oral two times a day  metoprolol tartrate 25 milliGRAM(s) Oral two times a day      	  LABS:	                         15.2   10.95 )-----------( 265      ( 03 Feb 2020 06:33 )             47.9     02-03    141  |  104  |  16  ----------------------------<  175<H>  3.8   |  30  |  0.65    Ca    9.0      03 Feb 2020 06:33  Phos  3.4     02-03  Mg     2.0     02-03    TPro  7.2  /  Alb  3.1<L>  /  TBili  0.6  /  DBili  x   /  AST  15  /  ALT  20  /  AlkPhos  70  02-03      Lipid Profile: Cholesterol 269    HDL 54      HgA1c: Hemoglobin A1C, Whole Blood: 11.5 % (01-31 @ 09:16)    TSH: Thyroid Stimulating Hormone, Serum: 1.58 uU/mL (02-01 @ 07:36)  Thyroid Stimulating Hormone, Serum: 0.75 uU/mL (01-31 @ 06:07)

## 2020-02-03 NOTE — PROGRESS NOTE ADULT - SUBJECTIVE AND OBJECTIVE BOX
Interval Events:    denies complaints  tolerating po intake  poc glucose improved control  requiring sliding scale    Allergies    penicillin (Unknown)    Intolerances      Endocrine/Metabolic Medications:  atorvastatin 40 milliGRAM(s) Oral at bedtime  insulin glargine Injectable (LANTUS) 30 Unit(s) SubCutaneous every morning  insulin lispro (HumaLOG) corrective regimen sliding scale   SubCutaneous three times a day before meals  insulin lispro Injectable (HumaLOG) 5 Unit(s) SubCutaneous three times a day before meals      Vital Signs Last 24 Hrs  T(C): 36.7 (03 Feb 2020 11:23), Max: 36.9 (02 Feb 2020 15:16)  T(F): 98.1 (03 Feb 2020 11:23), Max: 98.5 (02 Feb 2020 15:16)  HR: 67 (03 Feb 2020 11:23) (58 - 70)  BP: 148/50 (03 Feb 2020 11:23) (110/55 - 158/57)  BP(mean): --  RR: 18 (03 Feb 2020 11:23) (16 - 18)  SpO2: 95% (03 Feb 2020 11:23) (94% - 97%)    Weight (kg): 90.1 (02-03 @ 06:24)    PHYSICAL EXAM  All physical exam findings normal, except those marked:  Constitutional:    NC/AT:    HEENT:    Neck:  No JVD  Respiratory:  Clear without rales or rhonchi    Cardiovascular:  RR   Gastrointestinal: Soft , non tender    Extremities: without cyanosis    Neurological:  Oriented   x  3        LABS                        15.2   10.95 )-----------( 265      ( 03 Feb 2020 06:33 )             47.9                               141    |  104    |  16                  Calcium: 9.0   / iCa: x      (02-03 @ 06:33)    ----------------------------<  175       Magnesium: 2.0                              3.8     |  30     |  0.65             Phosphorous: 3.4      TPro  7.2    /  Alb  3.1    /  TBili  0.6    /  DBili  x      /  AST  15     /  ALT  20     /  AlkPhos  70     03 Feb 2020 06:33    CAPILLARY BLOOD GLUCOSE      POCT Blood Glucose.: 261 mg/dL (03 Feb 2020 10:52)  POCT Blood Glucose.: 165 mg/dL (02 Feb 2020 21:02)  POCT Blood Glucose.: 188 mg/dL (02 Feb 2020 16:39)        Assesment/plan        80 yo female with multiple comorbidities including h/o DM type 2, HTN, HLD admitted with syncope    endocrinology consulted for glycemic management    DM type 2  uncontrolled  complicated by hyperglycemia  HBA1C: 11.5  home regimen:  glipizide 10mg daily  metformin 1000mg bid    recommendations:  elevated prandial and requiring mod dose sliding scale- therefore increase bolus dose      - continue insulin lantus 30 units daily in am, first dose now 1/31/20  - increase insulin lispro to 8 units pre meals  - reduce to low dose sliding scale  - reassess based on requirements  - goal inpatient glucose range: 140-180mg/dl    tentative discharge recommendations:  stop glipizide on discharge  continue metformin 1000mg bid if eGFR remains above 45 prior to discharge  discussed insulin use with patient since HBA1C >9%, she lives with her  who would be able to help her with injections  (b/l hand deformities)- would do twice daily insulin injections for ease of use  insulin humulin 70/30: 20 units in am prior to breakfast and 10 units in pm prior to dinner  patient and family members0 /daughter- need insulin teaching    HLD  on simvastatin 20mg at home- low dose  goal LDL <70,  LFTs wnl  - recommend switch from simvastatin to atorvastatin 40mg hs   (moderate intensity statin)  - repeat fasting lipid profile in 8-12 weeks to assess response  - TGs mildly elevated- would expect improvement with low fat/diabetic diet and diabetes control    HTN  elevated  home anti HTNs to be resumed  per primary team    Discussed with primary team  Please call  Endocrine- Dr Syl Olivas as needed Interval Events:    denies complaints  tolerating po intake  poc glucose improved control  requiring sliding scale    Allergies    penicillin (Unknown)    Intolerances      Endocrine/Metabolic Medications:  atorvastatin 40 milliGRAM(s) Oral at bedtime  insulin glargine Injectable (LANTUS) 30 Unit(s) SubCutaneous every morning  insulin lispro (HumaLOG) corrective regimen sliding scale   SubCutaneous three times a day before meals  insulin lispro Injectable (HumaLOG) 5 Unit(s) SubCutaneous three times a day before meals      Vital Signs Last 24 Hrs  T(C): 36.7 (03 Feb 2020 11:23), Max: 36.9 (02 Feb 2020 15:16)  T(F): 98.1 (03 Feb 2020 11:23), Max: 98.5 (02 Feb 2020 15:16)  HR: 67 (03 Feb 2020 11:23) (58 - 70)  BP: 148/50 (03 Feb 2020 11:23) (110/55 - 158/57)  BP(mean): --  RR: 18 (03 Feb 2020 11:23) (16 - 18)  SpO2: 95% (03 Feb 2020 11:23) (94% - 97%)    Weight (kg): 90.1 (02-03 @ 06:24)    PHYSICAL EXAM  All physical exam findings normal, except those marked:  Constitutional:    NC/AT:    HEENT:    Neck:  No JVD  Respiratory:  Clear without rales or rhonchi    Cardiovascular:  RR   Gastrointestinal: Soft , non tender    Extremities: without cyanosis    Neurological:  Oriented   x  3        LABS                        15.2   10.95 )-----------( 265      ( 03 Feb 2020 06:33 )             47.9                               141    |  104    |  16                  Calcium: 9.0   / iCa: x      (02-03 @ 06:33)    ----------------------------<  175       Magnesium: 2.0                              3.8     |  30     |  0.65             Phosphorous: 3.4      TPro  7.2    /  Alb  3.1    /  TBili  0.6    /  DBili  x      /  AST  15     /  ALT  20     /  AlkPhos  70     03 Feb 2020 06:33    CAPILLARY BLOOD GLUCOSE      POCT Blood Glucose.: 261 mg/dL (03 Feb 2020 10:52)  POCT Blood Glucose.: 165 mg/dL (02 Feb 2020 21:02)  POCT Blood Glucose.: 188 mg/dL (02 Feb 2020 16:39)        Assesment/plan        80 yo female with multiple comorbidities including h/o DM type 2, HTN, HLD admitted with syncope    endocrinology consulted for glycemic management    DM type 2  uncontrolled  complicated by hyperglycemia  HBA1C: 11.5  home regimen:  glipizide 10mg daily  metformin 1000mg bid    recommendations:  elevated prandial and requiring mod dose sliding scale- therefore increase bolus dose      - continue insulin lantus 30 units daily in am, first dose now 1/31/20  - increase insulin lispro to 8 units pre meals  - reduce to low dose sliding scale  - reassess based on requirements  - goal inpatient glucose range: 140-180mg/dl    tentative discharge recommendations:    continue metformin 1000mg bid if eGFR remains above 45 prior to discharge  discussed insulin use with patient since HBA1C >9%, she lives with her , also has daughter  spoke with daughter over the phone- she reports patient has episodes of confusion, she does not live with mother, reports  would not be able to help with insulin injections  therefore advised can continue glipizide and metformin on discharge  follow up outpatient- outpatient follow up information provided    HLD  on simvastatin 20mg at home- low dose  goal LDL <70,  LFTs wnl  - recommend switch from simvastatin to atorvastatin 40mg hs   (moderate intensity statin)  - repeat fasting lipid profile in 8-12 weeks to assess response  - TGs mildly elevated- would expect improvement with low fat/diabetic diet and diabetes control    HTN  elevated  home anti HTNs to be resumed  per primary team    Discussed with primary team  Please call  Endocrine- Dr Syl Olivas as needed

## 2020-02-03 NOTE — DIETITIAN INITIAL EVALUATION ADULT. - PERTINENT LABORATORY DATA
02-03 Na141 mmol/L Glu 175 mg/dL<H> K+ 3.8 mmol/L Cr  0.65 mg/dL BUN 16 mg/dL 02-03 Phos 3.4 mg/dL 02-03 Alb 3.1 g/dL<L> 01-31 [id number] 11.5 %<H> 01-31 Chol 269 mg/dL<H>  mg/dL HDL 54 mg/dL Trig 163 mg/dL<H>

## 2020-02-03 NOTE — EEG REPORT - NS EEG TEXT BOX
BAILEY WAGGONER [id number]     Study Date: 		02-03-20    ROUTINE EEG    Technical Information:			  		  Placement and Labeling of Electrodes:  The EEG was performed utilizing 20 channels referential EEG connections (coronal over temporal over parasagittal montage) using all standard 10-20 electrode placements with EKG.  Recording was at a sampling rate of 256 samples per second per channel.  Time synchronized digital video recording was done simultaneously with EEG recording.  A low light infrared camera was used for low light recording.  Dickson and seizure detection algorithms were utilized.    CSA Technical Component:  Quantitative EEG analysis using a separate Compressed Spectral Array (CSA) software package was conducted in real-time and run at bedside after set up by the technician, digitally displaying the power of electrographic frequencies included in the 1-30Hz band using a graded color map.  This data was reviewed and interpreted independently, and is reported in a separate section below.    --------------------------------------------------------------------------------------------------  History:  CC/ HPI Patient is a 79y old  Female who presents with a chief complaint of syncope (03 Feb 2020 12:44)    MEDICATIONS  (STANDING):  aspirin  chewable 81 milliGRAM(s) Oral daily  atorvastatin 40 milliGRAM(s) Oral at bedtime  cyanocobalamin Injectable 1000 MICROGram(s) IntraMuscular daily  enoxaparin Injectable 40 milliGRAM(s) SubCutaneous daily  ergocalciferol 15785 Unit(s) Oral <User Schedule>  insulin glargine Injectable (LANTUS) 30 Unit(s) SubCutaneous every morning  insulin lispro (HumaLOG) corrective regimen sliding scale   SubCutaneous three times a day before meals  insulin lispro Injectable (HumaLOG) 5 Unit(s) SubCutaneous three times a day before meals  losartan 25 milliGRAM(s) Oral two times a day  metoprolol tartrate 25 milliGRAM(s) Oral two times a day    --------------------------------------------------------------------------------------------------  Study Interpretation:    [[[Abbreviation Key:  PDR=alpha rhythm/posterior dominant rhythm. A-P=anterior posterior gradient.  Amplitude: ‘very low’:<20; ‘low’:20-50; ‘medium’:; ‘high’:>200uV.  Persistence for periodic/rhythmic patterns (% of epoch) ‘rare’:<1%; ‘occasional’:1-10%; ‘frequent’:10-50%; ‘abundant’:50-90%; ‘continuous’:>90%.  Persistence for sporadic discharges: ‘rare’:<1/hr; ‘occasional’:1/min-1/hr; ‘frequent’:>1/min; ‘abundant’:>1/10 sec.  GRDA=generalized rhythmic delta activity, LRDA=lateralized rhythmic delta activity, TIRDA=temporal intermittent rhythmic delta activity, FIRDA=frontal intermittent rhythmic activity. LPD=PLED=lateralized periodic discharges, GPD=generalized periodic discharges, BiPDs=BiPLEDs=bilateral independent periodic epileptiform discharges, SIRPID=stimulus induced rhythmic, periodic, or ictal appearing discharges.  Modifiers: +F=with fast component, +S=with spike component, +R=with rhythmic component.  S-B=burst suppression pattern.  Max=maximal. N1-drowsy, N2-stage II sleep, N3-slow wave sleep.  HV=hyperventilation, PS=photic stimulation]]]    FINDINGS:  The background was continuous, spontaneously variable and reactive.  During wakefulness, the posteriorly dominant rhythm was poorly modulated at 7-7.5hz.      There was more diffuse irregular theta and delta activity present maximum over the left temporal region> right.    Sleep Background:  Drowsiness was characterized by fragmentation, attenuation, and slowing of the background activity.    Sleep was characterized by the presence of vertex waves, symmetric spindles, and K-complexes.  Stage II sleep transients were not recorded.    Epileptiform Activity:   No epileptiform discharges were present.    Events:  No clinical events were recorded.  No seizures were recorded.    Activation Procedures:   -Hyperventilation was not performed.    -Photic stimulation was performed and did not elicit any abnormalities.      Artifacts:  Intermittent myogenic and movement artifacts were noted.    ECG:  The heart rate on single channel ECG at baseline was predominantly near BPM = 60-70  -----------------------------------------------------------------------------------------------------    EEG Classification / Summary:  Abnormal EEG study  Mild to moderate background slowing with superimposed slowing maximum over the left temporal region> right.    -----------------------------------------------------------------------------------------------------    Clinical Impression:  Mild to moderate diffuse or multifocal cerebral dysfunction with superimposed slowing maximum over the left temporal region> right.   There were no epileptiform abnormalities recorded.      -------------------------------------------------------------------------------------------------------  Diana Juarez DO  Epilepsy Fellow, [facility] [place]

## 2020-02-03 NOTE — PROGRESS NOTE ADULT - ATTENDING COMMENTS
I counseled the patient about the CT Head and EEG findings, and the likely diagnosis of syncopal event.

## 2020-02-03 NOTE — DIETITIAN INITIAL EVALUATION ADULT. - OTHER INFO
spoke with patient about intake but forgetful per RN. Estimated height: 5'0", BMI= 38. no information on wt loss available . DM education deferred at this time as patient forgetful.

## 2020-02-04 NOTE — PROGRESS NOTE ADULT - SUBJECTIVE AND OBJECTIVE BOX
Interval Events:    tolerating po intake  bolus doses increased yesterday, hs glucose improved      Allergies    penicillin (Unknown)    Intolerances      Endocrine/Metabolic Medications:  atorvastatin 40 milliGRAM(s) Oral at bedtime  insulin glargine Injectable (LANTUS) 30 Unit(s) SubCutaneous every morning  insulin lispro (HumaLOG) corrective regimen sliding scale   SubCutaneous three times a day before meals  insulin lispro Injectable (HumaLOG) 8 Unit(s) SubCutaneous three times a day before meals      Vital Signs Last 24 Hrs  T(C): 36.5 (04 Feb 2020 07:18), Max: 36.7 (03 Feb 2020 11:23)  T(F): 97.7 (04 Feb 2020 07:18), Max: 98.1 (03 Feb 2020 11:23)  HR: 70 (04 Feb 2020 07:18) (59 - 74)  BP: 110/60 (04 Feb 2020 07:18) (101/83 - 157/70)  BP(mean): --  RR: 18 (04 Feb 2020 07:18) (18 - 18)  SpO2: 97% (04 Feb 2020 07:18) (95% - 97%)      PHYSICAL EXAM  All physical exam findings normal, except those marked:  Constitutional:    NC/AT:    HEENT:    Neck:  No JVD  Respiratory:  Clear without rales or rhonchi    Cardiovascular:  RR   Gastrointestinal: Soft , non tender    Extremities: without cyanosis    Neurological:  Oriented   x  3        LABS        CAPILLARY BLOOD GLUCOSE      POCT Blood Glucose.: 142 mg/dL (03 Feb 2020 21:14)  POCT Blood Glucose.: 168 mg/dL (03 Feb 2020 17:17)  POCT Blood Glucose.: 261 mg/dL (03 Feb 2020 10:52)        Assesment/plan        80 yo female with multiple comorbidities including h/o DM type 2, HTN, HLD admitted with syncope    endocrinology consulted for glycemic management    DM type 2  uncontrolled  complicated by hyperglycemia  HBA1C: 11.5  home regimen:  glipizide 10mg daily  metformin 1000mg bid    recommendations:  bolus dose increased yesterday, received dose pre dinner with hs improvement  continue current doses for today      - continue insulin lantus 30 units daily in am, first dose now 1/31/20  - continue lispro 8 units pre meals  - continue low dose sliding scale  - reassess based on requirements  - goal inpatient glucose range: 140-180mg/dl    tentative discharge recommendations:    - continue metformin 1000mg bid if eGFR remains above 45 prior to discharge  - since planned for discharge to rehab- can continue insulin regimen while in rehab since she will have assistance  continue lantus 30 units daily in am, lispro 4 units pre meals (reduced dose lispro since will resume metformin on discharge)      discussed insulin use with patient since HBA1C >9%, she lives with her , also has daughter  spoke with daughter over the phone- she reports patient has episodes of confusion, she does not live with mother, reports  would not be able to help with insulin injections  therefore advised can continue glipizide and metformin on discharge  follow up outpatient- outpatient follow up information provided      HLD  on simvastatin 20mg at home- low dose  goal LDL <70,  LFTs wnl  - recommend switch from simvastatin to atorvastatin 40mg hs   (moderate intensity statin)  - repeat fasting lipid profile in 8-12 weeks to assess response  - TGs mildly elevated- would expect improvement with low fat/diabetic diet and diabetes control    HTN  elevated  home anti HTNs to be resumed  per primary team    Discussed with primary team  Please call  Endocrine- Dr Syl Olivas as needed

## 2020-02-04 NOTE — PROGRESS NOTE ADULT - PROBLEM SELECTOR PLAN 4
patient has history of DM   on metformin  continue insulin HSS.  HbA1c 11.5  Patient on lantus 30u in am  humalog switched to 8u tid with meals  HSS switched to low dose as per christelle zhang  dc on 30 lantus, 4u humalog and 1000mgbid metformin  Dr Olivas following
patient has history of DM   on metformin  continue insulin HSS.  HbA1c 11.5  Patient on lantus 30u in am and humalog 5u tid with meals  Dr Olivas following
patient has history of DM   on metformin  continue insulin HSS.  HbA1c 11.5  Patient on lantus 30u in am  humalog switched to 8u tid with meals  HSS switched to low dose as per endo recmiracle Olivas following

## 2020-02-04 NOTE — PROGRESS NOTE ADULT - SUBJECTIVE AND OBJECTIVE BOX
HPI:  79 years old female from home, lives with her , with PMHx of HTN, DM, hyperlipidemia presenting s/p fall per ems, patient found on ground, unable to get up. Patient endorses she is unsure how she end up on the ground or at what time it happened. Patient was confused after the episode per .   Patient denies headache, nausea, vomiting, chest pain, shortness of breath. Patient also endorses knee pain on left side.  Patient is admitted for syncope. EKG is negative for ischemia. Orthostatic vitals is negative. Patient is being worked up for syncope. (31 Jan 2020 00:30)      Patient is a 79y old  Female who presents with a chief complaint of syncope (04 Feb 2020 11:18)      INTERVAL HPI/OVERNIGHT EVENTS:  T(C): 36.7 (02-04-20 @ 11:25), Max: 36.7 (02-04-20 @ 11:25)  HR: 52 (02-04-20 @ 11:25) (52 - 74)  BP: 116/70 (02-04-20 @ 11:25) (101/83 - 157/70)  RR: 18 (02-04-20 @ 11:25) (18 - 18)  SpO2: 98% (02-04-20 @ 11:25) (95% - 98%)  Wt(kg): --  I&O's Summary    04 Feb 2020 07:01  -  04 Feb 2020 13:57  --------------------------------------------------------  IN: 200 mL / OUT: 0 mL / NET: 200 mL        REVIEW OF SYSTEMS: denies fever, chills, SOB, palpitations, chest pain, abdominal pain, nausea, vomitting, diarrhea, constipation, dizziness    MEDICATIONS  (STANDING):  aspirin  chewable 81 milliGRAM(s) Oral daily  atorvastatin 40 milliGRAM(s) Oral at bedtime  cyanocobalamin Injectable 1000 MICROGram(s) IntraMuscular daily  enoxaparin Injectable 40 milliGRAM(s) SubCutaneous daily  ergocalciferol 94387 Unit(s) Oral <User Schedule>  insulin glargine Injectable (LANTUS) 30 Unit(s) SubCutaneous every morning  insulin lispro (HumaLOG) corrective regimen sliding scale   SubCutaneous three times a day before meals  insulin lispro Injectable (HumaLOG) 8 Unit(s) SubCutaneous three times a day before meals  losartan 25 milliGRAM(s) Oral two times a day  metoprolol tartrate 25 milliGRAM(s) Oral two times a day    MEDICATIONS  (PRN):      PHYSICAL EXAM:  GENERAL: NAD, well-groomed, well-developed  HEAD:  Atraumatic, Normocephalic  EYES: EOMI, PERRLA, conjunctiva and sclera clear  ENMT: No tonsillar erythema, exudates, or enlargement; Moist mucous membranes, Good dentition, No lesions  NECK: Supple, No JVD, Normal thyroid  NERVOUS SYSTEM:  Alert & Oriented X3, Good concentration; Motor Strength 5/5 B/L upper and lower extremities; DTRs 2+ intact and symmetric  CHEST/LUNG: Clear to percussion bilaterally; No rales, rhonchi, wheezing, or rubs  HEART: Regular rate and rhythm; No murmurs, rubs, or gallops  ABDOMEN: Soft, Nontender, Nondistended; Bowel sounds present  EXTREMITIES:  2+ Peripheral Pulses, No clubbing, cyanosis, or edema  LYMPH: No lymphadenopathy noted  SKIN: No rashes or lesions  LABS:                        15.2   10.95 )-----------( 265      ( 03 Feb 2020 06:33 )             47.9     02-03    141  |  104  |  16  ----------------------------<  175<H>  3.8   |  30  |  0.65    Ca    9.0      03 Feb 2020 06:33  Phos  3.4     02-03  Mg     2.0     02-03    TPro  7.2  /  Alb  3.1<L>  /  TBili  0.6  /  DBili  x   /  AST  15  /  ALT  20  /  AlkPhos  70  02-03        CAPILLARY BLOOD GLUCOSE      POCT Blood Glucose.: 252 mg/dL (04 Feb 2020 11:43)  POCT Blood Glucose.: 184 mg/dL (04 Feb 2020 08:10)  POCT Blood Glucose.: 142 mg/dL (03 Feb 2020 21:14)  POCT Blood Glucose.: 168 mg/dL (03 Feb 2020 17:17)

## 2020-02-04 NOTE — DISCHARGE NOTE PROVIDER - HOSPITAL COURSE
79 years old female from home, lives with her , with PMHx of HTN, DM, hyperlipidemia presenting s/p fall per ems, patient found on ground, unable to get up. Patient endorses she is unsure how she end up on the ground or at what time it happened. Patient was confused after the episode per .     Patient denies headache, nausea, vomiting, chest pain, shortness of breath. Patient also endorses knee pain on left side.    Patient was admitted for syncope. EKG was negative for ischemia. Orthostatic vitals were negative. Patient was worked up for syncope.cardiac troponin x 3 were negative    admitted to telemetry.    CONSUELO showed Severely dilated left atrium, mild concentric left ventricular hypertrophy.    and EF = 55 to 60%    stress test today was negative for ischemia    EEG showed mild to moderate diffuse or multifocal cerebral dysfunction with superimposed slowing maximum over the left temporal region> right.     us carotid showed no stenosis    cardiology consulted Dr Askew    neurology consulted    PT said rehab    discharge planning and placement.         Patient is stable for discharge per attending and is advised to follow up with PCP as outpatient    Please refer to patient's complete medical chart with documents for a full hospital course, for this is only a brief summary. 79 years old female from home, lives with her , with PMHx of HTN, DM, hyperlipidemia presenting s/p fall per ems, patient found on ground, unable to get up. Patient endorses she is unsure how she end up on the ground or at what time it happened. Patient was confused after the episode per .     Patient denies headache, nausea, vomiting, chest pain, shortness of breath. Patient also endorses knee pain on left side.    Patient was admitted for syncope to telemetry.  EKG was negative for ischemia. Orthostatic vitals were negative. Patient was worked up for syncope. Troponin x 3 were negative. CONSUELO showed severely dilated left atrium, mild concentric left ventricular hypertrophy and and EF = 55 to 60%. Patient had a stress test that was negative for ischemia. EEG showed mild to moderate diffuse or multifocal cerebral dysfunction with superimposed slowing maximum over the left temporal region> right. Us carotid showed no stenosis. Cardiology consulted was Dr Askew and neurology was also consulted. PT suggested rehab.         Patient is stable for discharge per attending and is advised to follow up with PCP as outpatient    Please refer to patient's complete medical chart with documents for a full hospital course, for this is only a brief summary.

## 2020-02-04 NOTE — PROGRESS NOTE ADULT - SUBJECTIVE AND OBJECTIVE BOX
CHIEF COMPLAINT:Patient is a 79y old  Female who presents with a chief complaint of syncope. Pt appears comfortable.    	  REVIEW OF SYSTEMS:  CONSTITUTIONAL: No fever, weight loss, or fatigue  EYES: No eye pain, visual disturbances, or discharge  ENT:  No difficulty hearing, tinnitus, vertigo; No sinus or throat pain  NECK: No pain or stiffness  RESPIRATORY: No cough, wheezing, chills or hemoptysis; No Shortness of Breath  CARDIOVASCULAR: No chest pain, palpitations, passing out, dizziness, or leg swelling  GASTROINTESTINAL: No abdominal or epigastric pain. No nausea, vomiting, or hematemesis; No diarrhea or constipation. No melena or hematochezia.  GENITOURINARY: No dysuria, frequency, hematuria, or incontinence  NEUROLOGICAL: No headaches, memory loss, loss of strength, numbness, or tremors  SKIN: No itching, burning, rashes, or lesions   LYMPH Nodes: No enlarged glands  ENDOCRINE: No heat or cold intolerance; No hair loss  MUSCULOSKELETAL: No joint pain or swelling; No muscle, back, or extremity pain  PSYCHIATRIC: No depression, anxiety, mood swings, or difficulty sleeping  HEME/LYMPH: No easy bruising, or bleeding gums  ALLERGY AND IMMUNOLOGIC: No hives or eczema	      PHYSICAL EXAM:  T(C): 36.5 (02-04-20 @ 07:18), Max: 36.7 (02-03-20 @ 11:23)  HR: 70 (02-04-20 @ 07:18) (59 - 74)  BP: 110/60 (02-04-20 @ 07:18) (101/83 - 157/70)  RR: 18 (02-04-20 @ 07:18) (18 - 18)  SpO2: 97% (02-04-20 @ 07:18) (95% - 97%)      Appearance: Normal	  HEENT:   Normal oral mucosa, PERRL, EOMI	  Lymphatic: No lymphadenopathy  Cardiovascular: Normal S1 S2, No JVD, No murmurs, No edema  Respiratory: Lungs clear to auscultation	  Psychiatry: A & O x 3, Mood & affect appropriate  Gastrointestinal:  Soft, Non-tender, + BS	  Skin: No rashes, No ecchymoses, No cyanosis	  Neurologic: Non-focal  Extremities: Normal range of motion, No clubbing, cyanosis or edema  Vascular: Peripheral pulses palpable 2+ bilaterally    MEDICATIONS  (STANDING):  aspirin  chewable 81 milliGRAM(s) Oral daily  atorvastatin 40 milliGRAM(s) Oral at bedtime  cyanocobalamin Injectable 1000 MICROGram(s) IntraMuscular daily  enoxaparin Injectable 40 milliGRAM(s) SubCutaneous daily  ergocalciferol 32766 Unit(s) Oral <User Schedule>  insulin glargine Injectable (LANTUS) 30 Unit(s) SubCutaneous every morning  insulin lispro (HumaLOG) corrective regimen sliding scale   SubCutaneous three times a day before meals  insulin lispro Injectable (HumaLOG) 8 Unit(s) SubCutaneous three times a day before meals  losartan 25 milliGRAM(s) Oral two times a day  metoprolol tartrate 25 milliGRAM(s) Oral two times a day      LABS:	 	                        15.2   10.95 )-----------( 265      ( 03 Feb 2020 06:33 )             47.9     02-03    141  |  104  |  16  ----------------------------<  175<H>  3.8   |  30  |  0.65    Ca    9.0      03 Feb 2020 06:33  Phos  3.4     02-03  Mg     2.0     02-03    TPro  7.2  /  Alb  3.1<L>  /  TBili  0.6  /  DBili  x   /  AST  15  /  ALT  20  /  AlkPhos  70  02-03      Lipid Profile: Cholesterol 269    HDL 54      HgA1c: Hemoglobin A1C, Whole Blood: 11.5 % (01-31 @ 09:16)    TSH: Thyroid Stimulating Hormone, Serum: 1.58 uU/mL (02-01 @ 07:36)  Thyroid Stimulating Hormone, Serum: 0.75 uU/mL (01-31 @ 06:07)      IMPRESSIONS:Normal Study  * Negative ECG evidence of ischemia after IV of Lexiscan.  * Review of raw data shows: The study is of good technical  quality.  * The left ventricle was normal in size. Normal myocardial  perfusion scan, with no evidence of infarction or  inducible ischemia.  * Gated wall motion analysis is performed, and shows  normal wall motion with post stress LVEF of 64%.    	  EEG Classification / Summary:  Abnormal EEG study  Mild to moderate background slowing with superimposed slowing maximum over the left temporal region> right.    -----------------------------------------------------------------------------------------------------    Clinical Impression:  Mild to moderate diffuse or multifocal cerebral dysfunction with superimposed slowing maximum over the left temporal region> right.   There were no epileptiform abnormalities recorded.      -------------------------------------------------------------------------------------------------------  Diana Juarez DO  Epilepsy Fellow, [facility] [place]

## 2020-02-04 NOTE — DISCHARGE NOTE PROVIDER - NSDCCPCAREPLAN_GEN_ALL_CORE_FT
PRINCIPAL DISCHARGE DIAGNOSIS  Diagnosis: Syncope, unspecified syncope type  Assessment and Plan of Treatment:       SECONDARY DISCHARGE DIAGNOSES  Diagnosis: Diabetes  Assessment and Plan of Treatment: continue metformin 1000mg bid if eGFR remains above 45 prior to discharge  - since planned for discharge to rehab- can continue insulin regimen while in rehab since she will have assistance  continue lantus 30 units daily in am, lispro 4 units pre meals (reduced dose lispro since will resume metformin on discharge)      Diagnosis: Hypertension  Assessment and Plan of Treatment: Continue with blood pressure medication. Maintain a healthy diet that consist of low sugar, low fat, low sodium diet. Exercise frequently if possible.  Follow up with primary care physician in one week after discharge. PRINCIPAL DISCHARGE DIAGNOSIS  Diagnosis: Syncope, unspecified syncope type  Assessment and Plan of Treatment: Ypu presented after a fall and were admitted for syncope to telemetry.  EKG was negative for ischemia. Orthostatic vitals were negative.  A trans-esophageal echocardiogram showed severely dilated left atrium, mild concentric left ventricular hypertrophy and and ejection fraction of 55 to 60%. You had a stress test that was negative for ischemia. You also got an EEG and seizures were ruled out.  Cardiology consulted was Dr Askew and neurology was also consulted. No further neurodiagnostic testing is indicated.   - Plentiful fluid hydration encouraged  - Caution with position changes  - Please follow with your pcp within one week after discharge.  - You are also suggested cardiology follow-up given finding of left atrial dilation.        SECONDARY DISCHARGE DIAGNOSES  Diagnosis: Diabetes  Assessment and Plan of Treatment: Continue with your blood sugar medication. HbAIC was found to be ___ on admission.  You must maintain a healthy diet that consist of low sugar, low fat, low sodium diet. Exercise frequently if possible. Consider repeating your Hemoglobin A1c within 3 months after discharge to monitor your average blood glucose control. Follow up with primary care physician in one week after discharge.   continue metformin 1000mg bid if eGFR remains above 45 prior to discharge  - since planned for discharge to rehab- can continue insulin regimen while in rehab since she will have assistance  continue lantus 30 units daily in am, lispro 4 units pre meals (reduced dose lispro since will resume metformin on discharge)      Diagnosis: Hypertension  Assessment and Plan of Treatment: Continue with blood pressure medication. Maintain a healthy diet that consist of low sugar, low fat, low sodium diet. Exercise frequently if possible.  Follow up with primary care physician in one week after discharge. PRINCIPAL DISCHARGE DIAGNOSIS  Diagnosis: Syncope, unspecified syncope type  Assessment and Plan of Treatment: Ypu presented after a fall and were admitted for syncope to telemetry.  EKG was negative for ischemia. Orthostatic vitals were negative.  A trans-esophageal echocardiogram showed severely dilated left atrium, mild concentric left ventricular hypertrophy and and ejection fraction of 55 to 60%. You had a stress test that was negative for ischemia. You also got an EEG and seizures were ruled out.  Cardiology consulted was Dr Askew and neurology was also consulted. No further neurodiagnostic testing is indicated.   - Plentiful fluid hydration encouraged  - Caution with position changes  - Please follow with your pcp within one week after discharge.  - You are also suggested cardiology follow-up given finding of left atrial dilation.        SECONDARY DISCHARGE DIAGNOSES  Diagnosis: Diabetes  Assessment and Plan of Treatment: Continue with your blood sugar medication. HbAIC was found to be 11.5 on admission.  You must maintain a healthy diet that consist of low sugar, low fat, low sodium diet. Exercise frequently if possible. Consider repeating your Hemoglobin A1c within 3 months after discharge to monitor your average blood glucose control. Follow up with primary care physician in one week after discharge.   - Continue metformin 1000mg bid.  - Since planned for discharge to rehab-  continue insulin regimen while in rehab since she will have assistance. Continue lantus 30 units daily in am, lispro 4 units pre meals.      Diagnosis: Hypertension  Assessment and Plan of Treatment: Continue with blood pressure medication. Maintain a healthy diet that consist of low sugar, low fat, low sodium diet. Exercise frequently if possible.  Follow up with primary care physician in one week after discharge.

## 2020-02-04 NOTE — DISCHARGE NOTE PROVIDER - NSDCMRMEDTOKEN_GEN_ALL_CORE_FT
aspirin 81 mg oral tablet, chewable: 1 tab(s) orally once a day  atorvastatin 40 mg oral tablet: 1 tab(s) orally once a day (at bedtime)  Drisdol 50,000 intl units (1.25 mg) oral capsule: 1 cap(s) orally once a week  insulin glargine: 30 unit(s) subcutaneous once a day (in the morning)  insulin lispro: 4 unit(s) subcutaneous 3 times a day (before meals)   losartan 25 mg oral tablet: 1 tab(s) orally 2 times a day  metFORMIN 1000 mg oral tablet: 1 tab(s) orally 2 times a day  metoprolol tartrate 25 mg oral tablet: 1 tab(s) orally 2 times a day

## 2020-02-04 NOTE — PROGRESS NOTE ADULT - PROBLEM SELECTOR PLAN 3
patient takes simvastatin 20 mg at home.  switched to atorvastatin 40mg as per endo recommendations

## 2020-02-04 NOTE — PROGRESS NOTE ADULT - PROBLEM SELECTOR PLAN 2
patient takes lisinopril, metoprolol and amlodipine,  cont b blocker, contiunue cozaar 25mg bid.  monitor blood pressure
patient takes lisinopril, metoprolol and amlodipine,  cont b blocker, d/c norvasc,add cozaar 25mg bid.  monitor blood pressure
patient takes lisinopril, metoprolol and amlodipine,  cont b blocker, contiunue cozaar 25mg bid.  monitor blood pressure

## 2020-02-04 NOTE — PROGRESS NOTE ADULT - SUBJECTIVE AND OBJECTIVE BOX
PGY 1 Note discussed with supervising resident and primary attending    Patient is a 79y old  Female who presents with a chief complaint of syncope (04 Feb 2020 09:49)      INTERVAL HPI/OVERNIGHT EVENTS: Patient seen and examined at the bedside. Patient pending placement to rehab.     MEDICATIONS  (STANDING):  aspirin  chewable 81 milliGRAM(s) Oral daily  atorvastatin 40 milliGRAM(s) Oral at bedtime  cyanocobalamin Injectable 1000 MICROGram(s) IntraMuscular daily  enoxaparin Injectable 40 milliGRAM(s) SubCutaneous daily  ergocalciferol 33349 Unit(s) Oral <User Schedule>  insulin glargine Injectable (LANTUS) 30 Unit(s) SubCutaneous every morning  insulin lispro (HumaLOG) corrective regimen sliding scale   SubCutaneous three times a day before meals  insulin lispro Injectable (HumaLOG) 8 Unit(s) SubCutaneous three times a day before meals  losartan 25 milliGRAM(s) Oral two times a day  metoprolol tartrate 25 milliGRAM(s) Oral two times a day    MEDICATIONS  (PRN):      __________________________________________________  REVIEW OF SYSTEMS:    CONSTITUTIONAL: No fever,   EYES: no acute visual disturbances  NECK: No pain or stiffness  RESPIRATORY: No cough; No shortness of breath  CARDIOVASCULAR: No chest pain, no palpitations  GASTROINTESTINAL: No pain. No nausea or vomiting; No diarrhea   NEUROLOGICAL: No headache or numbness, no tremors  MUSCULOSKELETAL: No joint pain, no muscle pain  GENITOURINARY: no dysuria, no frequency, no hesitancy  PSYCHIATRY: no depression , no anxiety  ALL OTHER  ROS negative        Vital Signs Last 24 Hrs  T(C): 36.5 (04 Feb 2020 07:18), Max: 36.7 (03 Feb 2020 11:23)  T(F): 97.7 (04 Feb 2020 07:18), Max: 98.1 (03 Feb 2020 11:23)  HR: 70 (04 Feb 2020 07:18) (59 - 74)  BP: 110/60 (04 Feb 2020 07:18) (101/83 - 157/70)  BP(mean): --  RR: 18 (04 Feb 2020 07:18) (18 - 18)  SpO2: 97% (04 Feb 2020 07:18) (95% - 97%)    ________________________________________________  PHYSICAL EXAMINATION:    GENERAL: NAD  HEENT: Normocephalic;  conjunctivae and sclerae clear; moist mucous membranes;   NECK : supple  CHEST/LUNG: Clear to auscultation bilaterally with good air entry   HEART: S1 S2  regular; no murmurs, gallops or rubs  ABDOMEN: Soft, Nontender, Nondistended; Bowel sounds present  EXTREMITIES: no cyanosis; no edema; hand deformities bilaterally  SKIN: warm and dry; no rash  NERVOUS SYSTEM:  Awake and alert; Oriented  to place, person and time ; no new deficits      _________________________________________________  LABS:                        15.2   10.95 )-----------( 265      ( 03 Feb 2020 06:33 )             47.9     02-03    141  |  104  |  16  ----------------------------<  175<H>  3.8   |  30  |  0.65    Ca    9.0      03 Feb 2020 06:33  Phos  3.4     02-03  Mg     2.0     02-03    TPro  7.2  /  Alb  3.1<L>  /  TBili  0.6  /  DBili  x   /  AST  15  /  ALT  20  /  AlkPhos  70  02-03        CAPILLARY BLOOD GLUCOSE      POCT Blood Glucose.: 184 mg/dL (04 Feb 2020 08:10)  POCT Blood Glucose.: 142 mg/dL (03 Feb 2020 21:14)  POCT Blood Glucose.: 168 mg/dL (03 Feb 2020 17:17)        RADIOLOGY & ADDITIONAL TESTS:    Imaging Personally Reviewed:  YES/NO    Consultant(s) Notes Reviewed:   YES/ No    Care Discussed with Consultants :     Plan of care was discussed with patient and /or primary care giver; all questions and concerns were addressed and care was aligned with patient's wishes.

## 2020-02-04 NOTE — PROGRESS NOTE ADULT - PROBLEM SELECTOR PLAN 1
Patient is admitted with syncopal episode.  patient was confused after the episode.  EKG negative for ischemia.  orthostatic negative.  cardiac troponin x 3 were negative  admitted to telemetry.  CONSUELO showed Severely dilated left atrium, mild concentric left ventricular hypertrophy.  and EF = 55 to 60%  stress test was negative for ischemia  EEG showed mild to moderate diffuse or multifocal cerebral dysfunction with superimposed slowing maximum over the left temporal region> right.   us carotid showed no stenosis  cardiology consulted Dr Askew  neurology consulted  continue to take lot of fluids and exercise caution while changing positions  PT said rehab  discharge planning and placement
Patient is admitted with syncopal episode.  patient was confused after the episode.  patient denies any chest pain.  EKG is negative for ischemia.  orthostatic negative.  cardiac troponin x 3 were negative  admitted to telemetry.  CONSUELO showed Severely dilated left atrium, mild concentric left ventricular hypertrophy.  and EF = 55 to 60%  us carotid showed no stenosis  cardiology consulted Dr Askew  neurology consulted  patient for stress test  f/u EEG to rule out seizure  PT said rehab
Patient is admitted with syncopal episode.  patient was confused after the episode.  EKG negative for ischemia.  orthostatic negative.  cardiac troponin x 3 were negative  admitted to telemetry.  CONSUELO showed Severely dilated left atrium, mild concentric left ventricular hypertrophy.  and EF = 55 to 60%  stress test today was negative for ischemia  EEG showed mild to moderate diffuse or multifocal cerebral dysfunction with superimposed slowing maximum over the left temporal region> right.   us carotid showed no stenosis  cardiology consulted Dr Askew  neurology consulted  PT said rehab  discharge planning and placement

## 2020-02-05 NOTE — PROGRESS NOTE ADULT - ASSESSMENT
seen and examined   pt was brought in after a fall with dizzyness.  pt has dizzyness in past a few times.  no fall before. denies cp or sob or palp.  no headache  no LOC.  vsstable physical done  perrla eomi, no nystagmus. no bruite, heart no murmur. cns power reflexes nml.  no cerebeller dysf.  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  a1c 11  k 3.4   wbc 13.4.  a/p fall dizzyness  r/o cardiac and neuro causes     neuro  cardio  a1c 11 over  nutrition  endo  diet exercise
79 years old female from home, lives with her , with PMHx of HTN, DM, hyperlipidemia presenting s/p fall per ems, patient found on ground, uincontrolled DM.  1.D/C Tele monitoring.  2.Neurology f/u.  3.Uncontrolled DM-Endo f/u,Insulin.  4.HTN-cont b blocker, cozaar 25mg bid.  5.Lipid d/o-statin.  6.GI and DVT prophylaxis.  7..Replace vit D.
79 years old female from home, lives with her , with PMHx of HTN, DM, hyperlipidemia presenting s/p fall per ems, patient found on ground, uincontrolled DM.  1.PT.  2.Neurology f/u.  3.Uncontrolled DM-Endo f/u,Insulin.  4.HTN-cont b blocker, cozaar 25mg bid.  5.Lipid d/o-statin.  6.GI and DVT prophylaxis.  7..Replace vit D.
79 years old female from home, lives with her , with PMHx of HTN, DM, hyperlipidemia presenting s/p fall per ems, patient found on ground, uincontrolled DM.  1.Tele monitoring.  2.Orthostatic BP q shift.  3.Neurology eval noted, EEG-P.  4.Uncontrolled DM-Endo eval,Insulin.  5.HTN-cont b blocker, d/c norvasc,add cozaar 25mg bid..  6.Lipid d/o-statin.  7.GI and DVT prophylaxis.  8.Stress test-r/o ischemia.  9.Replace vit D.
79 years old female from home, lives with her , with PMHx of HTN, DM, hyperlipidemia presenting s/p fall per ems, patient found on ground, uincontrolled DM.  1.Tele monitoring.  2.Orthostatic BP q shift.  3.Neurology f/u, EEG-P.  4.Uncontrolled DM-Endo f/u,Insulin.  5.HTN-cont b blocker, cozaar 25mg bid.  6.Lipid d/o-statin.  7.GI and DVT prophylaxis.  8.Stress test-r/o ischemia.  9.Replace vit D.
79 years old female from home, lives with her , with PMHx of HTN, DM, hyperlipidemia presenting s/p fall per ems, patient found on ground, uincontrolled DM.  1.Tele monitoring.  2.Orthostatic BP q shift.  3.Neurology f/u, EEG-P.  4.Uncontrolled DM-Endo f/u,Insulin.  5.HTN-cont b blocker, cozaar 25mg bid..  6.Lipid d/o-statin.  7.GI and DVT prophylaxis.  8.Stress test-r/o ischemia.  9.Replace vit D.
79 years old female from home, lives with her , with PMHx of HTN, DM, hyperlipidemia presenting s/p fall per ems, patient found on ground, unable to get up. Patient endorses she is unsure how she end up on the ground or at what time it happened. Patient was confused after the episode per .   Patient denies headache, nausea, vomiting, chest pain, shortness of breath. Patient also endorses knee pain on left side.  Patient is admitted for syncope. EKG is negative for ischemia. Orthostatic vitals is negative. Patient is being worked up for syncope.
79 years old female from home, lives with her , with PMHx of HTN, DM, hyperlipidemia presenting s/p fall per ems, patient found on ground, unable to get up. Patient endorses she is unsure how she end up on the ground or at what time it happened. Patient was confused after the episode per .   Patient denies headache, nausea, vomiting, chest pain, shortness of breath. Patient also endorses knee pain on left side.  Patient is admitted for syncope. EKG is negative for ischemia. Orthostatic vitals is negative. Patient is being worked up for syncope.
seen and examined vsstable afebrile physical unchaged  no cp or sob or palp.  pt is being d/cd to rehab    f/u with pcp  fall prevention  wt reduction ( diet  exercise)
seen and examined vstsable afebrile physical unchaged  no cp or sob or palp. no dizzyness, no hedache   on chair comfortable no cp or palp.   syncope  card, neuro on board.  dm bs are better  PT eval
seen nd examined vs stable afebrile physical unchanged  no cp or sob or palp.  no complaints    labs noted  stress test neg  eeg read was neg   d/c planning  to rehab
twin nde xamined vsstable afebrile physical unchaged  no cp or sob   on chair comfortable   on chair comfortable    awaiting stress test  and eeg   then d/c planning
79 years old female from home, lives with her , with PMHx of HTN, DM, hyperlipidemia presenting s/p fall per ems, patient found on ground, unable to get up. Patient endorses she is unsure how she end up on the ground or at what time it happened. Patient was confused after the episode per .   Patient denies headache, nausea, vomiting, chest pain, shortness of breath. Patient also endorses knee pain on left side.  Patient is admitted for syncope. EKG is negative for ischemia. Orthostatic vitals is negative. Patient is being worked up for syncope.

## 2020-02-05 NOTE — PROGRESS NOTE ADULT - REASON FOR ADMISSION
syncope

## 2020-02-05 NOTE — PROGRESS NOTE ADULT - SUBJECTIVE AND OBJECTIVE BOX
Interval Events:    appears comfortable  denies complaints  tolerating po intake    Allergies    penicillin (Unknown)    Intolerances      Endocrine/Metabolic Medications:  atorvastatin 40 milliGRAM(s) Oral at bedtime  insulin glargine Injectable (LANTUS) 30 Unit(s) SubCutaneous every morning  insulin lispro (HumaLOG) corrective regimen sliding scale   SubCutaneous three times a day before meals  insulin lispro Injectable (HumaLOG) 8 Unit(s) SubCutaneous three times a day before meals      Vital Signs Last 24 Hrs  T(C): 36.6 (05 Feb 2020 11:39), Max: 36.8 (05 Feb 2020 07:28)  T(F): 97.8 (05 Feb 2020 11:39), Max: 98.2 (05 Feb 2020 07:28)  HR: 63 (05 Feb 2020 11:39) (50 - 63)  BP: 141/45 (05 Feb 2020 11:39) (139/58 - 168/80)  BP(mean): --  RR: 18 (05 Feb 2020 11:39) (17 - 19)  SpO2: 95% (05 Feb 2020 11:39) (95% - 98%)      PHYSICAL EXAM  All physical exam findings normal, except those marked:  Constitutional:    NC/AT:    HEENT:    Neck:  No JVD  Respiratory:  Clear without rales or rhonchi    Cardiovascular:  RR   Gastrointestinal: Soft , non tender    Extremities: without cyanosis    Neurological:  Oriented   x  3          LABS        CAPILLARY BLOOD GLUCOSE      POCT Blood Glucose.: 184 mg/dL (05 Feb 2020 07:45)  POCT Blood Glucose.: 247 mg/dL (04 Feb 2020 21:20)  POCT Blood Glucose.: 165 mg/dL (04 Feb 2020 16:35)        Assesment/plan      78 yo female with multiple comorbidities including h/o DM type 2, HTN, HLD admitted with syncope    endocrinology consulted for glycemic management    DM type 2  uncontrolled  complicated by hyperglycemia  HBA1C: 11.5  home regimen:  glipizide 10mg daily  metformin 1000mg bid    recommendations:  prandial hyperglycemia- monitor for today- if trend continues, recommend increase bolus      - continue insulin lantus 30 units daily in am  - continue lispro 8 units pre meals  - continue low dose sliding scale  - reassess based on requirements  - goal inpatient glucose range: 140-180mg/dl    tentative discharge recommendations:    - continue metformin 1000mg bid if eGFR remains above 45 prior to discharge  - since planned for discharge to rehab- can continue insulin regimen while in rehab since she will have assistance  continue lantus 30 units daily in am, lispro 4 units pre meals (reduced dose lispro since will resume metformin on discharge)      discussed insulin use with patient since HBA1C >9%, she lives with her , also has daughter  spoke with daughter over the phone- she reports patient has episodes of confusion, she does not live with mother, reports  would not be able to help with insulin injections  therefore advised can continue glipizide and metformin on discharge  follow up outpatient- outpatient follow up information provided      HLD  on simvastatin 20mg at home- low dose  goal LDL <70,  LFTs wnl  - recommend switch from simvastatin to atorvastatin 40mg hs   (moderate intensity statin)  - repeat fasting lipid profile in 8-12 weeks to assess response  - TGs mildly elevated- would expect improvement with low fat/diabetic diet and diabetes control    HTN  elevated  home anti HTNs to be resumed  per primary team    Discussed with primary team  Please call  Endocrine- Dr Syl Olivas as needed

## 2020-02-05 NOTE — PROGRESS NOTE ADULT - SUBJECTIVE AND OBJECTIVE BOX
HPI:  79 years old female from home, lives with her , with PMHx of HTN, DM, hyperlipidemia presenting s/p fall per ems, patient found on ground, unable to get up. Patient endorses she is unsure how she end up on the ground or at what time it happened. Patient was confused after the episode per .   Patient denies headache, nausea, vomiting, chest pain, shortness of breath. Patient also endorses knee pain on left side.  Patient is admitted for syncope. EKG is negative for ischemia. Orthostatic vitals is negative. Patient is being worked up for syncope. (31 Jan 2020 00:30)      Patient is a 79y old  Female who presents with a chief complaint of syncope (05 Feb 2020 12:05)      INTERVAL HPI/OVERNIGHT EVENTS:  T(C): 36.6 (02-05-20 @ 11:39), Max: 36.8 (02-05-20 @ 07:28)  HR: 63 (02-05-20 @ 11:39) (50 - 63)  BP: 141/45 (02-05-20 @ 11:39) (141/45 - 168/80)  RR: 18 (02-05-20 @ 11:39) (18 - 19)  SpO2: 95% (02-05-20 @ 11:39) (95% - 98%)  Wt(kg): --  I&O's Summary    04 Feb 2020 07:01  -  05 Feb 2020 07:00  --------------------------------------------------------  IN: 200 mL / OUT: 0 mL / NET: 200 mL        REVIEW OF SYSTEMS: denies fever, chills, SOB, palpitations, chest pain, abdominal pain, nausea, vomitting, diarrhea, constipation, dizziness    MEDICATIONS  (STANDING):  aspirin  chewable 81 milliGRAM(s) Oral daily  atorvastatin 40 milliGRAM(s) Oral at bedtime  cyanocobalamin Injectable 1000 MICROGram(s) IntraMuscular daily  enoxaparin Injectable 40 milliGRAM(s) SubCutaneous daily  ergocalciferol 10567 Unit(s) Oral <User Schedule>  insulin glargine Injectable (LANTUS) 30 Unit(s) SubCutaneous every morning  insulin lispro (HumaLOG) corrective regimen sliding scale   SubCutaneous three times a day before meals  insulin lispro Injectable (HumaLOG) 8 Unit(s) SubCutaneous three times a day before meals  losartan 25 milliGRAM(s) Oral two times a day  metoprolol tartrate 25 milliGRAM(s) Oral two times a day    MEDICATIONS  (PRN):      PHYSICAL EXAM:  GENERAL: NAD, well-groomed, well-developed  HEAD:  Atraumatic, Normocephalic  EYES: EOMI, PERRLA, conjunctiva and sclera clear  ENMT: No tonsillar erythema, exudates, or enlargement; Moist mucous membranes, Good dentition, No lesions  NECK: Supple, No JVD, Normal thyroid  NERVOUS SYSTEM:  Alert & Oriented X3, Good concentration; Motor Strength 5/5 B/L upper and lower extremities; DTRs 2+ intact and symmetric  CHEST/LUNG: Clear to percussion bilaterally; No rales, rhonchi, wheezing, or rubs  HEART: Regular rate and rhythm; No murmurs, rubs, or gallops  ABDOMEN: Soft, Nontender, Nondistended; Bowel sounds present  EXTREMITIES:  2+ Peripheral Pulses, No clubbing, cyanosis, or edema  LYMPH: No lymphadenopathy noted  SKIN: No rashes or lesions  LABS:              CAPILLARY BLOOD GLUCOSE      POCT Blood Glucose.: 170 mg/dL (05 Feb 2020 11:58)  POCT Blood Glucose.: 184 mg/dL (05 Feb 2020 07:45)  POCT Blood Glucose.: 247 mg/dL (04 Feb 2020 21:20)  POCT Blood Glucose.: 165 mg/dL (04 Feb 2020 16:35)

## 2020-02-05 NOTE — PROGRESS NOTE ADULT - SUBJECTIVE AND OBJECTIVE BOX
CHIEF COMPLAINT:Patient is a 79y old  Female who presents with a chief complaint of syncope .Pt appears comfortable.    	  REVIEW OF SYSTEMS:  CONSTITUTIONAL: No fever, weight loss, or fatigue  EYES: No eye pain, visual disturbances, or discharge  ENT:  No difficulty hearing, tinnitus, vertigo; No sinus or throat pain  NECK: No pain or stiffness  RESPIRATORY: No cough, wheezing, chills or hemoptysis; No Shortness of Breath  CARDIOVASCULAR: No chest pain, palpitations, passing out, dizziness, or leg swelling  GASTROINTESTINAL: No abdominal or epigastric pain. No nausea, vomiting, or hematemesis; No diarrhea or constipation. No melena or hematochezia.  GENITOURINARY: No dysuria, frequency, hematuria, or incontinence  NEUROLOGICAL: No headaches, memory loss, loss of strength, numbness, or tremors  SKIN: No itching, burning, rashes, or lesions   LYMPH Nodes: No enlarged glands  ENDOCRINE: No heat or cold intolerance; No hair loss  MUSCULOSKELETAL: No joint pain or swelling; No muscle, back, or extremity pain  PSYCHIATRIC: No depression, anxiety, mood swings, or difficulty sleeping  HEME/LYMPH: No easy bruising, or bleeding gums  ALLERGY AND IMMUNOLOGIC: No hives or eczema	      PHYSICAL EXAM:  T(C): 36.8 (02-05-20 @ 07:28), Max: 36.8 (02-05-20 @ 07:28)  HR: 50 (02-05-20 @ 07:28) (50 - 63)  BP: 142/50 (02-05-20 @ 07:28) (116/70 - 168/80)  RR: 18 (02-05-20 @ 07:28) (17 - 19)  SpO2: 96% (02-05-20 @ 07:28) (95% - 98%)    I&O's Summary    04 Feb 2020 07:01  -  05 Feb 2020 07:00  --------------------------------------------------------  IN: 200 mL / OUT: 0 mL / NET: 200 mL        Appearance: Normal	  HEENT:   Normal oral mucosa, PERRL, EOMI	  Lymphatic: No lymphadenopathy  Cardiovascular: Normal S1 S2, No JVD, No murmurs, No edema  Respiratory: Lungs clear to auscultation	  Psychiatry: A & O x 3, Mood & affect appropriate  Gastrointestinal:  Soft, Non-tender, + BS	  Skin: No rashes, No ecchymoses, No cyanosis	  Neurologic: Non-focal  Extremities: Normal range of motion, No clubbing, cyanosis or edema  Vascular: Peripheral pulses palpable 2+ bilaterally    MEDICATIONS  (STANDING):  aspirin  chewable 81 milliGRAM(s) Oral daily  atorvastatin 40 milliGRAM(s) Oral at bedtime  cyanocobalamin Injectable 1000 MICROGram(s) IntraMuscular daily  enoxaparin Injectable 40 milliGRAM(s) SubCutaneous daily  ergocalciferol 09369 Unit(s) Oral <User Schedule>  insulin glargine Injectable (LANTUS) 30 Unit(s) SubCutaneous every morning  insulin lispro (HumaLOG) corrective regimen sliding scale   SubCutaneous three times a day before meals  insulin lispro Injectable (HumaLOG) 8 Unit(s) SubCutaneous three times a day before meals  losartan 25 milliGRAM(s) Oral two times a day  metoprolol tartrate 25 milliGRAM(s) Oral two times a day      	  LABS:	 	      Lipid Profile: Cholesterol 269    HDL 54      HgA1c: Hemoglobin A1C, Whole Blood: 11.5 % (01-31 @ 09:16)    TSH: Thyroid Stimulating Hormone, Serum: 1.58 uU/mL (02-01 @ 07:36)  Thyroid Stimulating Hormone, Serum: 0.75 uU/mL (01-31 @ 06:07)

## 2020-02-05 NOTE — DISCHARGE NOTE NURSING/CASE MANAGEMENT/SOCIAL WORK - PATIENT PORTAL LINK FT
You can access the FollowMyHealth Patient Portal offered by [facility] by registering at the following website: [email or web address]. By joining [employer]’s FollowMyHealth portal, you will also be able to view your health information using other applications (apps) compatible with our system.

## 2020-03-10 NOTE — ED ADULT TRIAGE NOTE - RESPIRATORY RATE (BREATHS/MIN)
Encounter Date: 1/3/2020       History     Chief Complaint   Patient presents with    Chest Pain     HPI   Pt is a 64 YO F w/ significant CAD hx- multiple PCIs, s/p bypass surgery 2012 follows w/ Dr. Bourne,  COPD, CHF (Diastolic dysfunction last EF 55%), gastritis pt reports that she has been having intermittent chest pain over the past x 2 weeks progressively becoming more frequent. Epigastric abdominal pain w/ radiation to L chest, shoulder and L neck associated w/ intermittent n/v and diaphoresis. Usually pain is exertional however, recently has not been fully releived by rest. Pt reports she was previously on medications for angina (Ranaxa) had not had it in several Months 2/2 control of her symptoms until x2 weeks ago. Does not have nitroglycerin at home. Reports symptoms similar to prior episodes of Anginal and MI symptoms.     Review of patient's allergies indicates:   Allergen Reactions    Metronidazole Other (See Comments)     DISORIENTED       Statins-hmg-coa reductase inhibitors Tinitus     Joint pain     Past Medical History:   Diagnosis Date    Arthritis     Coronary artery disease     Hypertension      Past Surgical History:   Procedure Laterality Date    HYSTERECTOMY       No family history on file.  Social History     Tobacco Use    Smoking status: Never Smoker   Substance Use Topics    Alcohol use: No    Drug use: No     Review of Systems   Constitutional: Negative for chills and fever.   HENT: Negative for congestion and sore throat.    Eyes: Negative for photophobia and visual disturbance.   Respiratory: Positive for shortness of breath. Negative for cough and wheezing.    Cardiovascular: Positive for chest pain. Negative for leg swelling.   Gastrointestinal: Negative for abdominal distention, diarrhea and nausea.   Genitourinary: Negative for dysuria and hematuria.   Musculoskeletal: Negative for gait problem and neck pain.   Skin: Negative for rash and wound.   Neurological: Negative  for syncope and headaches.   Psychiatric/Behavioral: Negative for agitation and confusion.       Physical Exam     Initial Vitals [01/03/20 0039]   BP Pulse Resp Temp SpO2   (!) 173/58 71 16 98.4 °F (36.9 °C) 96 %      MAP       --         Physical Exam    Nursing note and vitals reviewed.  Constitutional: She appears well-developed and well-nourished. No distress.   HENT:   Head: Normocephalic and atraumatic.   Eyes: EOM are normal. Pupils are equal, round, and reactive to light.   Neck: Normal range of motion. Neck supple.   Cardiovascular: Normal rate, regular rhythm and intact distal pulses.   No murmur heard.  Pulmonary/Chest: Breath sounds normal. No accessory muscle usage. No tachypnea. No respiratory distress. She has no wheezes. She has no rhonchi.   Abdominal: Soft. She exhibits no distension. There is no tenderness. There is no guarding.   Musculoskeletal: Normal range of motion. She exhibits no edema or tenderness.   Neurological: She is alert and oriented to person, place, and time.   Skin: Skin is warm and dry. Capillary refill takes less than 2 seconds.         ED Course   Procedures  Labs Reviewed   COMPREHENSIVE METABOLIC PANEL - Abnormal; Notable for the following components:       Result Value    Glucose 115 (*)     eGFR if non  53.2 (*)     All other components within normal limits   CBC W/ AUTO DIFFERENTIAL   TROPONIN I   B-TYPE NATRIURETIC PEPTIDE   APTT   PROTIME-INR     EKG Readings: (Independently Interpreted)   Initial Reading: No STEMI. Previous EKG: Compared with most recent EKG Rhythm: Normal Sinus Rhythm. Heart Rate: 60. Conduction: Normal. T Waves Elevated: V1 and V2. Clinical Impression: Normal Sinus Rhythm       Imaging Results          X-Ray Chest AP Portable (In process)                                                  Clinical Impression:     Resident MDM PGY-3  Sakshi Baronezeinabpaula is a 65 y.o. YO female presenting w/ c/o angina which began around 10 pm, intermittent  hx over the past x2 weeks, last cardiac cath in 2017 w/ patent stents. Last echo 3/2019 w/ diastolic dysfunction.  VSS, Afebrile, non toxic appearing.   Differentials include unstable angina, ACS, MSK, COPD exacerbation, PNA, pleural effusion  Labs significant for CBC WNL, no electrolyte abnormalities, Trop and BNP WNL.   CXR w/ no obvious infiltrates, blunting of L costophrenic angle.   Will given ASA. HEART score=5.  Consulted to hospital medicine for admission for unstable angina.    Kelle Carrasco MD MPH  [employer] Emergency Medicine PGY-3  1:26 AM 1/3/2020      ICD-10-CM ICD-9-CM   1. Unstable angina I20.0 411.1   2. Chest pain R07.9 786.50   3. SOB (shortness of breath) R06.02 786.05                             Kelle Carrasco MD  Resident  01/03/20 0343     17

## 2020-03-10 NOTE — ED PROVIDER NOTE - CLINICAL SUMMARY MEDICAL DECISION MAKING FREE TEXT BOX
78 y/o F pt presents with episode of AMS and slurred speech, currently resolved. Symptoms suggestive of possible TIA. Will do labs, CAT scan, stroke up and recommend admission. 78 y/o F pt presents with episode of AMS and slurred speech, currently resolved. Symptoms suggestive of possible TIA. Will do labs, CAT scan, stroke up and recommend admission. NIHSS 0, passed dysphagia evaluation.

## 2020-03-10 NOTE — ED PROVIDER NOTE - MUSCULOSKELETAL, MLM
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

## 2020-03-10 NOTE — ED ADULT NURSE NOTE - OBJECTIVE STATEMENT
pt is here for altered mental status. As per family member, wake up with altered mental status for one hour, denied pain or sob, denied chest pain, no distress noted at this time with family member.

## 2020-03-10 NOTE — ED PROVIDER NOTE - OBJECTIVE STATEMENT
80 y/o F pt with a PMHx of Afib, dementia, DM, HLD, HTN, BIB family to the ED for episode of AMS and slurred speech for 30mins today. Family reports patient went to sleep normally around 4pm and when she woke up, she had trouble making words and seemed to be very confused. States patient was back to normal when the ambulance arrived at the site. Patient is not on blood thinners because she is a fall risk. Daughters report patient fell, went to rehab facility and was just discharged home 2 weeks ago. Family noticed b/l leg swelling for the last few days. Denies fever, vomiting, diarrhea, cough or any other symptoms. Allergies: penicillin 80 y/o F pt with a PMHx of Afib, dementia, DM, HLD, HTN, BIB family to the ED for episode of AMS and slurred speech for 30mins today. Family reports patient went to sleep normally around 4pm and when she woke up, she had trouble making words and seemed to be very confused. States patient was back to normal when the ambulance arrived at the site. Patient has history of afib and is not on blood thinners because she is a fall risk. Daughters report patient fell, went to rehab facility and was just discharged home 2 weeks ago. Family noticed b/l leg swelling for the last few days. Denies fever, vomiting, diarrhea, cough or any other symptoms. Allergies: penicillin

## 2020-03-11 NOTE — CONSULT NOTE ADULT - SUBJECTIVE AND OBJECTIVE BOX
79 F with history of syncopal event with fall in February 2020, presents with new episode of confusion with slurred speech lasting a few minutes. The patient does not remember this event.    Neuro exam is notable for RUE atrophy and missing digits on right hand, chronic. Otherwise, neuro exam is unremarkable.    CT Head shows chronic left pontine infarct, but no acute abnormalities    Assessment:  Syncopal event vs. nonconvulsive seizure    Recs:  - Routine EEG approved to evaluate for seizure tendency  - Continue aspirin 81mg daily and atorvastatin 40mg QHS for secondary stroke prevention  - Plentiful fluid hydration  - Caution with position changes  - PT/OT      NOTE TO BE COMPLETED - PLEASE REFER TO ABOVE ONLY AND IGNORE INFORMATION BELOW        NEUROLOGY CONSULT NOTE    NAME:  BAILEY WAGGONER    CHIEF COMPLAINT:  Patient is a 79y old  Female who presents with a chief complaint of altered mental status and slurred speech (11 Mar 2020 16:12)      HPI:  79 years old Female patient from home with a PMHx of Afib ( not on any AC because of multiple falls), dementia, DM, HLD, HTN, BIB family to the ED for episode of AMS and slurred speech for 30mins today. Family reports patient went to sleep normally around 4pm and when she woke up, she had trouble making words and seemed to be very confused. States patient was back to normal when the ambulance arrived at the site. Patient has history of afib and is not on blood thinners because she is a fall risk. Daughters report patient fell, went to rehab facility and was just discharged home 2 weeks ago. Family noticed b/l leg swelling for the last few days. Denies fever, vomiting, diarrhea, cough or any other symptoms. Allergies: penicillin  Patient was recently admited to [facility] for syncope and had negative cardiac workup and negative EEG for seizure.  Patient is being admitted to telemetry for suspected TIA. NIHSS on presentation was 0. Patient passed bed side dysphagia screen. Started on aspirin and statin. (11 Mar 2020 00:46)      NEURO HPI:      PAST MEDICAL & SURGICAL HISTORY:  Afib  Dementia  HLD (hyperlipidemia)  HTN (hypertension)  DM (diabetes mellitus)      MEDICATIONS:  aspirin  chewable 81 milliGRAM(s) Oral daily  atorvastatin 40 milliGRAM(s) Oral at bedtime  enoxaparin Injectable 40 milliGRAM(s) SubCutaneous daily  insulin glargine Injectable (LANTUS) 15 Unit(s) SubCutaneous at bedtime  insulin lispro (HumaLOG) corrective regimen sliding scale   SubCutaneous Before meals and at bedtime      ALLERGIES:  penicillin (Unknown)      FAMILY HISTORY:      SOCIAL HISTORY:  Denies alcohol, tobacco, and illicit drug use    REVIEW OF SYSTEMS:  GENERAL: No fever, weight changes, fatigue  EYES: No eye pain or discharge  EAR/NOSE/MOUTH/THROAT: No sinus or throat pain; No difficulty hearing  NECK: No pain or stiffness  RESPIRATORY: No cough, wheezing, chills, or hemoptysis  CARDIOVASCULAR: No chest pain, palpitations, shortness of breath, or dyspnea on exertion  GASTROINTESTINAL: No abdominal pain, nausea, vomiting, hematemesis, diarrhea, or constipation  GENITOURINARY: No dysuria, frequency, hematuria, or incontinence  SKIN: No rashes or lesions  ENDOCRINE: No heat or cold intolerance  HEMATOLOGIC: No easy bruising or bleeding  PSYCHIATRIC: No depression, anxiety, or mood swings  MUSCULOSKELETAL: No joint pain or swelling  NEUROLOGICAL: As per HPI      OBJECTIVE:    Vital Signs Last 24 Hrs  T(C): 36.7 (11 Mar 2020 13:46), Max: 36.9 (11 Mar 2020 02:58)  T(F): 98.1 (11 Mar 2020 13:46), Max: 98.4 (11 Mar 2020 02:58)  HR: 61 (11 Mar 2020 13:46) (54 - 64)  BP: 127/74 (11 Mar 2020 13:46) (127/74 - 170/75)  BP(mean): --  RR: 18 (11 Mar 2020 13:46) (17 - 18)  SpO2: 96% (11 Mar 2020 13:46) (95% - 97%)    General Examination:  General: No acute distress  HEENT: Atraumatic, Normocephalic  Respiratory: CTA B/l.  No crackles, rhonchi, or wheezes.  Cardiovascular: RRR.  Normal S1 & S2.  Normal b/l radial and pedal pulses.    Neurological Examination:  General / Mental Status: AAO x 3.  No aphasia or dysarthria.  Naming and repetition intact.  Cranial Nerves: VFF x 4.  PERRL.  EOMI x 2, No nystagmus or diplopia.  B/l V1-V3 equal and intact to light touch and pinprick.  Symmetric facial movement and palate elevation.  B/l hearing equal to finger rub.  5/5 strength with b/l sternocleidomastoid & trapezius.  Midline tongue protrusion, with no atrophy or fasciculations.  Motor: Normal bulk & tone in all four extremities.  5/5 strength throughout all four extremities.  No downward drift, rigidity, spasticity, or tremors in any of the four extremities.  Sensory: Intact to light touch and pinprick in all four extremities.  Negative Romberg.  Reflex: 2+ and symmetric at b/l biceps, triceps, brachioradialis, patellae, and ankles.  Downgoing toes b/l.  Coordination: No dysmetria with b/l finger-to-nose and heel raise tests.  Symmetric rapid alternating movements b/l.  Gait: Normal, narrow-based gait.  No difficulty with tiptoe, heel, and tandem gaits.      Laboratory Values:    CBC Full  -  ( 11 Mar 2020 07:28 )  WBC Count : 10.30 K/uL  RBC Count : 4.86 M/uL  Hemoglobin : 14.1 g/dL  Hematocrit : 44.0 %  Platelet Count - Automated : 238 K/uL  Mean Cell Volume : 90.5 fl  Mean Cell Hemoglobin : 29.0 pg  Mean Cell Hemoglobin Concentration : 32.0 gm/dL    03-11    141  |  105  |  15  ----------------------------<  186<H>  3.8   |  30  |  0.72    Ca    8.8      11 Mar 2020 07:28  Phos  3.5     03-11  Mg     2.0     03-11    TPro  6.8  /  Alb  3.2<L>  /  TBili  0.5  /  DBili  0.1  /  AST  10  /  ALT  17  /  AlkPhos  81  03-11    03-11 [id number] 9.7              Neuroimaging:      Please contact the Neurology consult service with any questions.    Frank Mcknight MD   of Neurology  [facility] [employer] at [facility] NEUROLOGY CONSULT NOTE    NAME:  BAILEY WAGGONER    CHIEF COMPLAINT:  Patient is a 79y old  Female who presents with a chief complaint of altered mental status and slurred speech (11 Mar 2020 16:12)      HPI:  79 years old Female patient from home with a PMHx of Afib ( not on any AC because of multiple falls), dementia, DM, HLD, HTN, BIB family to the ED for episode of AMS and slurred speech for 30mins today. Family reports patient went to sleep normally around 4pm and when she woke up, she had trouble making words and seemed to be very confused. States patient was back to normal when the ambulance arrived at the site. Patient has history of afib and is not on blood thinners because she is a fall risk. Daughters report patient fell, went to rehab facility and was just discharged home 2 weeks ago. Family noticed b/l leg swelling for the last few days. Denies fever, vomiting, diarrhea, cough or any other symptoms. Allergies: penicillin  Patient was recently admited to [facility] for syncope and had negative cardiac workup and negative EEG for seizure.  Patient is being admitted to telemetry for suspected TIA. NIHSS on presentation was 0. Patient passed bed side dysphagia screen. Started on aspirin and statin. (11 Mar 2020 00:46)    NEURO HPI:  79 LHF with history of syncopal event with fall in February 2020, presents with new episode of confusion with slurred speech lasting a few minutes. The patient does not remember this event.    PAST MEDICAL & SURGICAL HISTORY:  Afib  Dementia  HLD (hyperlipidemia)  HTN (hypertension)  DM (diabetes mellitus)    MEDICATIONS:  aspirin  chewable 81 milliGRAM(s) Oral daily  atorvastatin 40 milliGRAM(s) Oral at bedtime  enoxaparin Injectable 40 milliGRAM(s) SubCutaneous daily  insulin glargine Injectable (LANTUS) 15 Unit(s) SubCutaneous at bedtime  insulin lispro (HumaLOG) corrective regimen sliding scale   SubCutaneous Before meals and at bedtime    ALLERGIES:  penicillin (Unknown)    FAMILY HISTORY:      SOCIAL HISTORY:  Denies alcohol, tobacco, and illicit drug use    REVIEW OF SYSTEMS:  GENERAL: No fever, weight changes, fatigue  EYES: No eye pain or discharge  EAR/NOSE/MOUTH/THROAT: No sinus or throat pain; No difficulty hearing  NECK: No pain or stiffness  RESPIRATORY: No cough, wheezing, chills, or hemoptysis  CARDIOVASCULAR: No chest pain, palpitations, shortness of breath, or dyspnea on exertion  GASTROINTESTINAL: No abdominal pain, nausea, vomiting, hematemesis, diarrhea, or constipation  GENITOURINARY: No dysuria, frequency, hematuria, or incontinence  SKIN: No rashes or lesions  ENDOCRINE: No heat or cold intolerance  HEMATOLOGIC: No easy bruising or bleeding  PSYCHIATRIC: No depression, anxiety, or mood swings  MUSCULOSKELETAL: No joint pain or swelling  NEUROLOGICAL: As per HPI      OBJECTIVE:    Vital Signs Last 24 Hrs  T(C): 36.7 (11 Mar 2020 13:46), Max: 36.9 (11 Mar 2020 02:58)  T(F): 98.1 (11 Mar 2020 13:46), Max: 98.4 (11 Mar 2020 02:58)  HR: 61 (11 Mar 2020 13:46) (54 - 64)  BP: 127/74 (11 Mar 2020 13:46) (127/74 - 170/75)  RR: 18 (11 Mar 2020 13:46) (17 - 18)  SpO2: 96% (11 Mar 2020 13:46) (95% - 97%)    General Exam:  General: No acute distress  Respiratory: CTAB/l.  No crackles, rhonchi, or wheezes.  Cardiovascular: RRR, No murmurs, Full b/l radial and pedal pulses  Musculoskeletal: Reconstructed digits present in both hands    Neurological Exam:  General / Mental Status: Oriented to person, place, and time.  No dysarthria or aphasia present.  Naming and repetition intact.  Cranial Nerves: PERRLA, EOMI x 2, VFF x 4, No nystagmus or diplopia.  B/l V1-V3 equal to light touch and pinprick.  Symmetric facial movement and palate elevation.  B/l hearing equal to finger rub.  Negative [place]-Hallpike maneuver b/l.  5/5 strength with b/l sternocleidomastoid and trapezius.  Midline tongue protrusion with no atrophy or fasciculations.  Motor: Normal bulk and tone in all four extremities.  5/5 strength throughout all four extremities.  No downward drift, rigidity, spasticity, or tremors in any of the four extremities.  Sensation: Intact to light touch and pinprick in all four extremities.  Coordination: No dysmetria with b/l finger-to-nose and heel raise tests.  Reflexes: 2+ and symmetric at b/l biceps, triceps, brachioradialis, patellae, and ankles.  Toes flexor b/l.  Gait and Romberg testing deferred per patient request.      Laboratory Values:    CBC Full  -  ( 11 Mar 2020 07:28 )  WBC Count : 10.30 K/uL  RBC Count : 4.86 M/uL  Hemoglobin : 14.1 g/dL  Hematocrit : 44.0 %  Platelet Count - Automated : 238 K/uL  Mean Cell Volume : 90.5 fl  Mean Cell Hemoglobin : 29.0 pg  Mean Cell Hemoglobin Concentration : 32.0 gm/dL    03-11    141  |  105  |  15  ----------------------------<  186<H>  3.8   |  30  |  0.72    Ca    8.8      11 Mar 2020 07:28  Phos  3.5     03-11  Mg     2.0     03-11    TPro  6.8  /  Alb  3.2<L>  /  TBili  0.5  /  DBili  0.1  /  AST  10  /  ALT  17  /  AlkPhos  81  03-11    03-11 [id number] 9.7      Neuroimaging:    CT Head (3/10/20):  - Chronic left pontine infarct  - Chronic microvascular disease  - Mild diffuse atrophy      Assessment:  79 LHF with syncopal event vs. nonconvulsive seizure      Recommendations:    - Routine EEG approved to evaluate for seizure tendency    - Continue aspirin 81mg daily and atorvastatin 40mg QHS for secondary stroke prevention for chronic pontine infarct    - Plentiful fluid hydration    - Caution with position changes    - PT/OT      Please contact the Neurology consult service with any questions.    Frank Mcknight MD   of Neurology  [facility] [employer] [facility] at [facility]

## 2020-03-11 NOTE — H&P ADULT - PROBLEM SELECTOR PLAN 3
Patient has history of DM on lantus and humalog at home.  will resume lantus and inslun hss.  up titrate as needed.  f/u [id number]

## 2020-03-11 NOTE — H&P ADULT - PROBLEM SELECTOR PLAN 2
Patient does have history of A fib but not on any AC.  because of multiple falls, patient and family decided not to be on AC despite high KRISTINA VASC score.

## 2020-03-11 NOTE — H&P ADULT - PROBLEM SELECTOR PLAN 7
IMPROVE VTE Individual Risk Assessment  RISK                                                          Points  [] Previous VTE                                           3  [] Thrombophilia                                        2  [] Lower limb paralysis                              2   [] Current Cancer                                       2   [] Immobilization > 24 hrs                        1  [] ICU/CCU stay > 24 hours                       1  [] Age > 60                                                   1  IMPROVE VTE Score = hep sc   for DVT chemoprophylaxis

## 2020-03-11 NOTE — CONSULT NOTE ADULT - SUBJECTIVE AND OBJECTIVE BOX
CHIEF COMPLAINT: Patient is a 79y old  Female who presents with a chief complaint of altered mental status and slurred speech (11 Mar 2020 10:07)      HPI:  79 years old Female patient from home with a PMHx of Afib ( not on any AC because of multiple falls), dementia, DM, HLD, HTN, BIB family to the ED for episode of AMS and slurred speech for 30mins today. Family reports patient went to sleep normally around 4pm and when she woke up, she had trouble making words and seemed to be very confused. States patient was back to normal when the ambulance arrived at the site. Patient has history of afib and is not on blood thinners because she is a fall risk. Daughters report patient fell, went to rehab facility and was just discharged home 2 weeks ago. Family noticed b/l leg swelling for the last few days. Denies fever, vomiting, diarrhea, cough or any other symptoms. Allergies: penicillin  Patient was recently admited to [facility] for syncope and had negative cardiac workup and negative EEG for seizure.  Patient is being admitted to telemetry for suspected TIA. NIHSS on presentation was 0. Patient passed bed side dysphagia screen. Started on aspirin and statin. (11 Mar 2020 00:46)   Patient seen and examined.     PAST MEDICAL & SURGICAL HISTORY:  Afib  Dementia  HLD (hyperlipidemia)  HTN (hypertension)  DM (diabetes mellitus)      Allergies    penicillin (Unknown)    Intolerances        MEDICATIONS  (STANDING):  aspirin  chewable 81 milliGRAM(s) Oral daily  atorvastatin 40 milliGRAM(s) Oral at bedtime  enoxaparin Injectable 40 milliGRAM(s) SubCutaneous daily  insulin glargine Injectable (LANTUS) 15 Unit(s) SubCutaneous at bedtime  insulin lispro (HumaLOG) corrective regimen sliding scale   SubCutaneous Before meals and at bedtime      MEDICATIONS  (PRN):   Medications up to date at time of exam.    FAMILY HISTORY:      SOCIAL HISTORY  Smoking History: [   ] smoking/smoke exposure, [   ] former smoker, [  ] denies smoking  Living Condition: [   ] apartment, [   ] private house  Work History:   Travel History: denies recent travel  Illicit Substance Use: denies  Alcohol Use: denies    REVIEW OF SYSTEMS:    CONSTITUTIONAL:  denies fevers, chills, sweats, weight loss    HEENT:  denies diplopia or blurred vision, sore throat or runny nose.    CARDIOVASCULAR:  denies pressure, squeezing, tightness, or heaviness about the chest; no palpitations.    RESPIRATORY:  denies SOB, cough, HASTINGS, wheezing.    GASTROINTESTINAL:  denies abdominal pain, nausea, vomiting or diarrhea.    GENITOURINARY: denies dysuria, frequency or urgency.    NEUROLOGIC:  denies numbness, tingling, seizures or weakness.    PSYCHIATRIC:  denies disorder of thought or mood.    MSK: denies swelling, redness      PHYSICAL EXAMINATION:    GENERAL: The patient is a well-developed, well-nourished, in no apparent distress.     Vital Signs Last 24 Hrs  T(C): 36.7 (11 Mar 2020 13:46), Max: 36.9 (11 Mar 2020 02:58)  T(F): 98.1 (11 Mar 2020 13:46), Max: 98.4 (11 Mar 2020 02:58)  HR: 61 (11 Mar 2020 13:46) (54 - 64)  BP: 127/74 (11 Mar 2020 13:46) (127/74 - 176/74)  BP(mean): --  RR: 18 (11 Mar 2020 13:46) (17 - 18)  SpO2: 96% (11 Mar 2020 13:46) (95% - 97%)   (if applicable)    Chest Tube (if applicable)    HEENT: Head is normocephalic and atraumatic. .    NECK: Supple, no palpable adenopathy.    LUNGS: Clear to auscultation, no wheezing, rales, or rhonchi.    HEART: Regular rate and rhythm without murmur.    ABDOMEN: Soft, nontender, and nondistended.  No hepatosplenomegaly is noted.    EXTREMITIES: Without any cyanosis, clubbing, rash, lesions or edema. +B/L LE edema    NEUROLOGIC: Awake, alert.    SKIN: Warm, dry, good turgor.      LABS:                        14.1   10.30 )-----------( 238      ( 11 Mar 2020 07:28 )             44.0     03-11    141  |  105  |  15  ----------------------------<  186<H>  3.8   |  30  |  0.72    Ca    8.8      11 Mar 2020 07:28  Phos  3.5     03-11  Mg     2.0     03-11    TPro  6.8  /  Alb  3.2<L>  /  TBili  0.5  /  DBili  0.1  /  AST  10  /  ALT  17  /  AlkPhos  81  03-11    PT/INR - ( 10 Mar 2020 20:35 )   PT: 11.4 sec;   INR: 1.03 ratio         PTT - ( 10 Mar 2020 20:35 )  PTT:35.6 sec  Urinalysis Basic - ( 10 Mar 2020 23:31 )    Color: Yellow / Appearance: Clear / S.020 / pH: x  Gluc: x / Ketone: Negative  / Bili: Negative / Urobili: Negative   Blood: x / Protein: Negative / Nitrite: Negative   Leuk Esterase: Negative / RBC: 2-5 /HPF / WBC 0-2 /HPF   Sq Epi: x / Non Sq Epi: Few /HPF / Bacteria: Moderate /HPF        CARDIAC MARKERS ( 11 Mar 2020 07:28 )  <0.015 ng/mL / x     / 48 U/L / x     / <1.0 ng/mL  CARDIAC MARKERS ( 10 Mar 2020 20:35 )  <0.015 ng/mL / x     / 62 U/L / x     / <1.0 ng/mL        Serum Pro-Brain Natriuretic Peptide: 324 pg/mL (03-10-20 @ 20:35)          MICROBIOLOGY: (if applicable)    RADIOLOGY & ADDITIONAL STUDIES:  EKG:   CXR:  ECHO:  < from: Xray Chest 1 View AP/PA (03.10.20 @ 21:00) >    EXAM:  XR CHEST AP OR PA 1V                            PROCEDURE DATE:  03/10/2020          INTERPRETATION:  AP erect chest on March 10, 2020 at 8:42 PM. Patient has altered mental status.    Heart is enlarged.    The lung fields and pleural surfaces are unremarkable.    Chest is similar to  of this year.    IMPRESSION: Cardiomegaly again noted.                SHELTON MENSAH M.D., ATTENDING RADIOLOGIST  This document has been electronically signed. Mar 11 2020  9:34AM                < end of copied text >      IMPRESSION: 79y Female PAST MEDICAL & SURGICAL HISTORY:  Afib  Dementia  HLD (hyperlipidemia)  HTN (hypertension)  DM (diabetes mellitus)           79 years old Female patient from home with a PMHx of Afib ( not on any AC because of multiple falls), dementia, DM, HLD, HTN, BIB family to the ED for episode of AMS and slurred speech for 30mins today. Family reports patient went to sleep normally around 4pm and when she woke up, she had trouble making words and seemed to be very confused. States patient was back to normal when the ambulance arrived at the site. Patient has history of afib and is not on blood thinners because she is a fall risk. Daughters report patient fell, went to rehab facility and was just discharged home 2 weeks ago. Family noticed b/l leg swelling for the last few days. Denies fever, vomiting, diarrhea, cough or any other symptoms. Allergies: penicillin  Patient was recently admited to [facility] for syncope and had negative cardiac workup and negative EEG for seizure.  Patient is being admitted to telemetry for suspected TIA. NIHSS on presentation was 0. Patient passed bed side dysphagia screen. Started on aspirin and statin.    encephalopathy, cause unclear  HASTINGS  --    Sugg   - neuro f/u   - bronchodilators, o2 supp prn    - HASTINGS likely 2/2 deconditioning   - DVT and GI prophylaxis. CHIEF COMPLAINT: Patient is a 79y old  Female who presents with a chief complaint of altered mental status and slurred speech (11 Mar 2020 10:07)      HPI:  79 years old Female patient from home with a PMHx of Afib ( not on any AC because of multiple falls), dementia, DM, HLD, HTN, BIB family to the ED for episode of AMS and slurred speech for 30mins today. Family reports patient went to sleep normally around 4pm and when she woke up, she had trouble making words and seemed to be very confused. States patient was back to normal when the ambulance arrived at the site. Patient has history of afib and is not on blood thinners because she is a fall risk. Daughters report patient fell, went to rehab facility and was just discharged home 2 weeks ago. Family noticed b/l leg swelling for the last few days. Denies fever, vomiting, diarrhea, cough or any other symptoms. Allergies: penicillin  Patient was recently admited to [facility] for syncope and had negative cardiac workup and negative EEG for seizure.  Patient is being admitted to telemetry for suspected TIA. NIHSS on presentation was 0. Patient passed bed side dysphagia screen. Started on aspirin and statin. (11 Mar 2020 00:46)   Patient seen and examined.     PAST MEDICAL & SURGICAL HISTORY:  Afib  Dementia  HLD (hyperlipidemia)  HTN (hypertension)  DM (diabetes mellitus)      Allergies    penicillin (Unknown)    Intolerances        MEDICATIONS  (STANDING):  aspirin  chewable 81 milliGRAM(s) Oral daily  atorvastatin 40 milliGRAM(s) Oral at bedtime  enoxaparin Injectable 40 milliGRAM(s) SubCutaneous daily  insulin glargine Injectable (LANTUS) 15 Unit(s) SubCutaneous at bedtime  insulin lispro (HumaLOG) corrective regimen sliding scale   SubCutaneous Before meals and at bedtime      MEDICATIONS  (PRN):   Medications up to date at time of exam.    FAMILY HISTORY:      SOCIAL HISTORY  Smoking History: [   ] smoking/smoke exposure, [   ] former smoker, [  ] denies smoking  Living Condition: [   ] apartment, [   ] private house  Work History:   Travel History: denies recent travel  Illicit Substance Use: denies  Alcohol Use: denies    REVIEW OF SYSTEMS:    CONSTITUTIONAL:  denies fevers, chills, sweats, weight loss    HEENT:  denies diplopia or blurred vision, sore throat or runny nose.    CARDIOVASCULAR:  denies pressure, squeezing, tightness, or heaviness about the chest; no palpitations.    RESPIRATORY:  denies SOB, cough, HASTINGS, wheezing.    GASTROINTESTINAL:  denies abdominal pain, nausea, vomiting or diarrhea.    GENITOURINARY: denies dysuria, frequency or urgency.    NEUROLOGIC:  denies numbness, tingling, seizures or weakness.    PSYCHIATRIC:  denies disorder of thought or mood.    MSK: denies swelling, redness      PHYSICAL EXAMINATION:    GENERAL: The patient is a well-developed, well-nourished, in no apparent distress.     Vital Signs Last 24 Hrs  T(C): 36.7 (11 Mar 2020 13:46), Max: 36.9 (11 Mar 2020 02:58)  T(F): 98.1 (11 Mar 2020 13:46), Max: 98.4 (11 Mar 2020 02:58)  HR: 61 (11 Mar 2020 13:46) (54 - 64)  BP: 127/74 (11 Mar 2020 13:46) (127/74 - 176/74)  BP(mean): --  RR: 18 (11 Mar 2020 13:46) (17 - 18)  SpO2: 96% (11 Mar 2020 13:46) (95% - 97%)   (if applicable)    Chest Tube (if applicable)    HEENT: Head is normocephalic and atraumatic. .    NECK: Supple, no palpable adenopathy.    LUNGS: Clear to auscultation, no wheezing, rales, or rhonchi.    HEART: Regular rate and rhythm without murmur.    ABDOMEN: Soft, nontender, and nondistended.  No hepatosplenomegaly is noted.    EXTREMITIES: Without any cyanosis, clubbing, rash, lesions or edema. +B/L LE edema    NEUROLOGIC: Awake, alert.    SKIN: Warm, dry, good turgor.      LABS:                        14.1   10.30 )-----------( 238      ( 11 Mar 2020 07:28 )             44.0     03-11    141  |  105  |  15  ----------------------------<  186<H>  3.8   |  30  |  0.72    Ca    8.8      11 Mar 2020 07:28  Phos  3.5     03-11  Mg     2.0     03-11    TPro  6.8  /  Alb  3.2<L>  /  TBili  0.5  /  DBili  0.1  /  AST  10  /  ALT  17  /  AlkPhos  81  03-11    PT/INR - ( 10 Mar 2020 20:35 )   PT: 11.4 sec;   INR: 1.03 ratio         PTT - ( 10 Mar 2020 20:35 )  PTT:35.6 sec  Urinalysis Basic - ( 10 Mar 2020 23:31 )    Color: Yellow / Appearance: Clear / S.020 / pH: x  Gluc: x / Ketone: Negative  / Bili: Negative / Urobili: Negative   Blood: x / Protein: Negative / Nitrite: Negative   Leuk Esterase: Negative / RBC: 2-5 /HPF / WBC 0-2 /HPF   Sq Epi: x / Non Sq Epi: Few /HPF / Bacteria: Moderate /HPF        CARDIAC MARKERS ( 11 Mar 2020 07:28 )  <0.015 ng/mL / x     / 48 U/L / x     / <1.0 ng/mL  CARDIAC MARKERS ( 10 Mar 2020 20:35 )  <0.015 ng/mL / x     / 62 U/L / x     / <1.0 ng/mL        Serum Pro-Brain Natriuretic Peptide: 324 pg/mL (03-10-20 @ 20:35)          MICROBIOLOGY: (if applicable)    RADIOLOGY & ADDITIONAL STUDIES:  EKG:   CXR:  ECHO:  < from: Xray Chest 1 View AP/PA (03.10.20 @ 21:00) >    EXAM:  XR CHEST AP OR PA 1V                            PROCEDURE DATE:  03/10/2020          INTERPRETATION:  AP erect chest on March 10, 2020 at 8:42 PM. Patient has altered mental status.    Heart is enlarged.    The lung fields and pleural surfaces are unremarkable.    Chest is similar to  of this year.    IMPRESSION: Cardiomegaly again noted.                SHELTON MENSAH M.D., ATTENDING RADIOLOGIST  This document has been electronically signed. Mar 11 2020  9:34AM                < end of copied text >      IMPRESSION: 79y Female PAST MEDICAL & SURGICAL HISTORY:  Afib  Dementia  HLD (hyperlipidemia)  HTN (hypertension)  DM (diabetes mellitus)           79 years old Female patient from home with a PMHx of Afib ( not on any AC because of multiple falls), dementia, DM, HLD, HTN, BIB family to the ED for episode of AMS and slurred speech for 30mins today. Family reports patient went to sleep normally around 4pm and when she woke up, she had trouble making words and seemed to be very confused. States patient was back to normal when the ambulance arrived at the site. Patient has history of afib and is not on blood thinners because she is a fall risk. Daughters report patient fell, went to rehab facility and was just discharged home 2 weeks ago. Family noticed b/l leg swelling for the last few days. Denies fever, vomiting, diarrhea, cough or any other symptoms. Allergies: penicillin  Patient was recently admited to [facility] for syncope and had negative cardiac workup and negative EEG for seizure.  Patient is being admitted to telemetry for suspected TIA. NIHSS on presentation was 0. Patient passed bed side dysphagia screen. Started on aspirin and statin.    encephalopathy, cause unclear  HASTINGS  --    Sugg   - neuro f/u   - bronchodilators, o2 supp prn    - HASTINGS likely 2/2 deconditioning   - DVT and GI prophylaxis.     Agree with above assessment and plan as transcribed.

## 2020-03-11 NOTE — CONSULT NOTE ADULT - REASON FOR ADMISSION
altered mental status and slurred speech

## 2020-03-11 NOTE — H&P ADULT - ATTENDING COMMENTS
Vital Signs Last 24 Hrs  T(C): 36.7 (10 Mar 2020 19:16), Max: 36.7 (10 Mar 2020 19:16)  T(F): 98.1 (10 Mar 2020 19:16), Max: 98.1 (10 Mar 2020 19:16)  HR: 63 (10 Mar 2020 19:16) (63 - 63)  BP: 176/74 (10 Mar 2020 19:16) (176/74 - 176/74)  BP(mean): --  RR: 17 (10 Mar 2020 19:16) (17 - 17)  SpO2: 96% (10 Mar 2020 19:16) (96% - 96%)
Type Of Destruction Used: Curettage
pt was evaluated by writer around 11am today

## 2020-03-11 NOTE — H&P ADULT - NSHPPHYSICALEXAM_GEN_ALL_CORE
Vital Signs Last 24 Hrs  T(C): 36.7 (10 Mar 2020 19:16), Max: 36.7 (10 Mar 2020 19:16)  T(F): 98.1 (10 Mar 2020 19:16), Max: 98.1 (10 Mar 2020 19:16)  HR: 63 (10 Mar 2020 19:16) (63 - 63)  BP: 176/74 (10 Mar 2020 19:16) (176/74 - 176/74)  BP(mean): --  RR: 17 (10 Mar 2020 19:16) (17 - 17)  SpO2: 96% (10 Mar 2020 19:16) (96% - 96%)  · CONSTITUTIONAL: Well appearing, awake, alert, oriented to person, place, time/situation and in no apparent distress.  · ENMT: Airway patent, Nasal mucosa clear. Mouth with normal mucosa. Throat has no vesicles, no oropharyngeal exudates and uvula is midline.  · EYES: Clear bilaterally, pupils equal, round and reactive to light.  · CARDIAC: Normal rate, regular rhythm.  Heart sounds S1, S2.  No murmurs, rubs or gallops.  · RESPIRATORY: Breath sounds clear and equal bilaterally.  · GASTROINTESTINAL: Abdomen soft, non-tender, no guarding.  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  · NEUROLOGICAL: A & O x2. Intact finger to nose, no pronator drift, strength and sensation are equal.  · SKIN: Skin normal color for race, warm, dry and intact. No evidence of rash.

## 2020-03-11 NOTE — PROGRESS NOTE ADULT - SUBJECTIVE AND OBJECTIVE BOX
PGY 1 Note discussed with supervising resident and primary attending    Patient is a 79y old  Female who presents with a chief complaint of altered mental status and slurred speech (11 Mar 2020 00:46)      INTERVAL HPI/OVERNIGHT EVENTS: offers no new complaints; current symptoms resolving    MEDICATIONS  (STANDING):  aspirin  chewable 81 milliGRAM(s) Oral daily  atorvastatin 40 milliGRAM(s) Oral at bedtime  enoxaparin Injectable 40 milliGRAM(s) SubCutaneous daily  insulin glargine Injectable (LANTUS) 15 Unit(s) SubCutaneous at bedtime  insulin lispro (HumaLOG) corrective regimen sliding scale   SubCutaneous Before meals and at bedtime    MEDICATIONS  (PRN):      __________________________________________________  REVIEW OF SYSTEMS:    CONSTITUTIONAL: No fever,   EYES: no acute visual disturbances  NECK: No pain or stiffness  RESPIRATORY: No cough; No shortness of breath  CARDIOVASCULAR: No chest pain, no palpitations  GASTROINTESTINAL: No pain. No nausea or vomiting; No diarrhea   NEUROLOGICAL: No headache or numbness, no tremors  MUSCULOSKELETAL: No joint pain, no muscle pain  GENITOURINARY: no dysuria, no frequency, no hesitancy  PSYCHIATRY: no depression , no anxiety  ALL OTHER  ROS negative        Vital Signs Last 24 Hrs  T(C): 36.7 (11 Mar 2020 05:55), Max: 36.9 (11 Mar 2020 02:58)  T(F): 98 (11 Mar 2020 05:55), Max: 98.4 (11 Mar 2020 02:58)  HR: 55 (11 Mar 2020 05:55) (55 - 64)  BP: 144/74 (11 Mar 2020 05:55) (132/67 - 176/74)  BP(mean): --  RR: 18 (11 Mar 2020 05:55) (17 - 18)  SpO2: 96% (11 Mar 2020 05:55) (96% - 97%)    ________________________________________________  PHYSICAL EXAM:  GENERAL: NAD  HEENT: Normocephalic;  conjunctivae and sclerae clear; moist mucous membranes;   NECK : supple  CHEST/LUNG: Clear to auscultation bilaterally with good air entry   HEART: S1 S2  regular; no murmurs, gallops or rubs  ABDOMEN: Soft, Nontender, Nondistended; Bowel sounds present  EXTREMITIES: no cyanosis; no edema; no calf tenderness  SKIN: warm and dry; no rash  NERVOUS SYSTEM:  Awake and alert; Oriented  to place, person and time ; no new deficits    _________________________________________________  LABS:                        14.1   10.30 )-----------( 238      ( 11 Mar 2020 07:28 )             44.0         141  |  105  |  15  ----------------------------<  186<H>  3.8   |  30  |  0.72    Ca    8.8      11 Mar 2020 07:28  Phos  3.5     -  Mg     2.0         TPro  x   /  Alb  3.2<L>  /  TBili  x   /  DBili  x   /  AST  x   /  ALT  x   /  AlkPhos  x   11    PT/INR - ( 10 Mar 2020 20:35 )   PT: 11.4 sec;   INR: 1.03 ratio         PTT - ( 10 Mar 2020 20:35 )  PTT:35.6 sec  Urinalysis Basic - ( 10 Mar 2020 23:31 )    Color: Yellow / Appearance: Clear / S.020 / pH: x  Gluc: x / Ketone: Negative  / Bili: Negative / Urobili: Negative   Blood: x / Protein: Negative / Nitrite: Negative   Leuk Esterase: Negative / RBC: 2-5 /HPF / WBC 0-2 /HPF   Sq Epi: x / Non Sq Epi: Few /HPF / Bacteria: Moderate /HPF      CAPILLARY BLOOD GLUCOSE      POCT Blood Glucose.: 167 mg/dL (11 Mar 2020 07:40)  POCT Blood Glucose.: 166 mg/dL (10 Mar 2020 19:27)        RADIOLOGY & ADDITIONAL TESTS:    Imaging Personally Reviewed:  YES/NO    Consultant(s) Notes Reviewed:   YES/ No    Care Discussed with Consultants :     Plan of care was discussed with patient and /or primary care giver; all questions and concerns were addressed and care was aligned with patient's wishes. PGY 1 Note discussed with supervising resident and primary attending    Patient is a 79y old  Female who presents with a chief complaint of altered mental status and slurred speech (11 Mar 2020 00:46)      INTERVAL HPI/OVERNIGHT EVENTS: pt has no current Neurological deficit. Symptoms resolved    MEDICATIONS  (STANDING):  aspirin  chewable 81 milliGRAM(s) Oral daily  atorvastatin 40 milliGRAM(s) Oral at bedtime  enoxaparin Injectable 40 milliGRAM(s) SubCutaneous daily  insulin glargine Injectable (LANTUS) 15 Unit(s) SubCutaneous at bedtime  insulin lispro (HumaLOG) corrective regimen sliding scale   SubCutaneous Before meals and at bedtime    MEDICATIONS  (PRN):      __________________________________________________  REVIEW OF SYSTEMS:    CONSTITUTIONAL: mild gen weakness  EYES: no acute visual disturbances  NECK: No pain or stiffness  RESPIRATORY: No cough; No shortness of breath  CARDIOVASCULAR: No chest pain, no palpitations  GASTROINTESTINAL: No pain. No nausea or vomiting; No diarrhea   NEUROLOGICAL:  occasional headcahes  MUSCULOSKELETAL: No joint pain, no muscle pain  GENITOURINARY: no dysuria, no frequency, no hesitancy  PSYCHIATRY: no depression , no anxiety  ALL OTHER  ROS negative        Vital Signs Last 24 Hrs  T(C): 36.7 (11 Mar 2020 05:55), Max: 36.9 (11 Mar 2020 02:58)  T(F): 98 (11 Mar 2020 05:55), Max: 98.4 (11 Mar 2020 02:58)  HR: 55 (11 Mar 2020 05:55) (55 - 64)  BP: 144/74 (11 Mar 2020 05:55) (132/67 - 176/74)  BP(mean): --  RR: 18 (11 Mar 2020 05:55) (17 - 18)  SpO2: 96% (11 Mar 2020 05:55) (96% - 97%)    ________________________________________________  PHYSICAL EXAM:  GENERAL: well built old female lying comfortably on bed and interacting  HEENT: Normocephalic;  conjunctivae and sclerae clear; moist mucous membranes;   NECK : supple  CHEST/LUNG: Clear to auscultation bilaterally with good air entry   HEART: S1 S2  regular; no murmurs, gallops or rubs  ABDOMEN: Soft, Nontender, Nondistended; Bowel sounds present  EXTREMITIES: 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 motor strength UL and LL- 5/5    _________________________________________________  LABS:                        14.1   10.30 )-----------( 238      ( 11 Mar 2020 07:28 )             44.0         141  |  105  |  15  ----------------------------<  186<H>  3.8   |  30  |  0.72    Ca    8.8      11 Mar 2020 07:28  Phos  3.5       Mg     2.0         TPro  x   /  Alb  3.2<L>  /  TBili  x   /  DBili  x   /  AST  x   /  ALT  x   /  AlkPhos  x   11    PT/INR - ( 10 Mar 2020 20:35 )   PT: 11.4 sec;   INR: 1.03 ratio         PTT - ( 10 Mar 2020 20:35 )  PTT:35.6 sec  Urinalysis Basic - ( 10 Mar 2020 23:31 )    Color: Yellow / Appearance: Clear / S.020 / pH: x  Gluc: x / Ketone: Negative  / Bili: Negative / Urobili: Negative   Blood: x / Protein: Negative / Nitrite: Negative   Leuk Esterase: Negative / RBC: 2-5 /HPF / WBC 0-2 /HPF   Sq Epi: x / Non Sq Epi: Few /HPF / Bacteria: Moderate /HPF      CAPILLARY BLOOD GLUCOSE      POCT Blood Glucose.: 167 mg/dL (11 Mar 2020 07:40)  POCT Blood Glucose.: 166 mg/dL (10 Mar 2020 19:27)        RADIOLOGY & ADDITIONAL TESTS:  < from: CT Head No Cont (03.10.20 @ 21:41) >  IMPRESSION:     No acute intracranial hemorrhage, large cortical infarct or mass effect. Additional findings as described. If clinically indicated, short-term follow-up or MRI may be obtained for further evaluation.          Imaging Personally Reviewed:  YES/NO    Consultant(s) Notes Reviewed:   YES/ No    Care Discussed with Consultants :     Plan of care was discussed with patient and /or primary care giver; all questions and concerns were addressed and care was aligned with patient's wishes.

## 2020-03-11 NOTE — H&P ADULT - ASSESSMENT
79 years old Female patient from home with a PMHx of Afib ( not on any AC because of multiple falls), dementia, DM, HLD, HTN, BIB family to the ED for episode of AMS and slurred speech for 30mins today. Family reports patient went to sleep normally around 4pm and when she woke up, she had trouble making words and seemed to be very confused. States patient was back to normal when the ambulance arrived at the site. Patient has history of afib and is not on blood thinners because she is a fall risk. Daughters report patient fell, went to rehab facility and was just discharged home 2 weeks ago. Family noticed b/l leg swelling for the last few days. Denies fever, vomiting, diarrhea, cough or any other symptoms. Allergies: penicillin  Patient was recently admited to [facility] for syncope and had negative cardiac workup and negative EEG for seizure.  Patient is being admitted to telemetry for suspected TIA. NIHSS on presentation was 0. Patient passed bed side dysphagia screen. Started on aspirin and statin.

## 2020-03-11 NOTE — CONSULT NOTE ADULT - ASSESSMENT
79 years old female from home, lives with her , with PMHx of HTN, DM, hyperlipidemia presenting slurred speech and altered mental status.  1.Neurology eval.  2.DM-Insulin.  3.HTN-on hold bp medication.  4.Lipid d/o-statin.  5.GI and DVT prophylaxis.

## 2020-03-11 NOTE — H&P ADULT - HISTORY OF PRESENT ILLNESS
79 years old Female patient from home with a PMHx of Afib ( not on any AC because of multiple falls), dementia, DM, HLD, HTN, BIB family to the ED for episode of AMS and slurred speech for 30mins today. Family reports patient went to sleep normally around 4pm and when she woke up, she had trouble making words and seemed to be very confused. States patient was back to normal when the ambulance arrived at the site. Patient has history of afib and is not on blood thinners because she is a fall risk. Daughters report patient fell, went to rehab facility and was just discharged home 2 weeks ago. Family noticed b/l leg swelling for the last few days. Denies fever, vomiting, diarrhea, cough or any other symptoms. Allergies: penicillin  Patient was recently admited to [facility] for syncope and had negative cardiac workup and negative EEG for seizure.  Patient is being admitted to telemetry for suspected TIA. NIHSS on presentation was 0. Patient passed bed side dysphagia screen. Started on aspirin and statin. no

## 2020-03-11 NOTE — H&P ADULT - NSICDXPASTMEDICALHX_GEN_ALL_CORE_FT
PAST MEDICAL HISTORY:  Afib     Dementia     DM (diabetes mellitus)     HLD (hyperlipidemia)     HTN (hypertension)

## 2020-03-11 NOTE — CONSULT NOTE ADULT - ATTENDING COMMENTS
I counseled the patient about the differential diagnosis for her symptoms, and the further testing indicated to confirm the diagnosis.

## 2020-03-11 NOTE — CONSULT NOTE ADULT - SUBJECTIVE AND OBJECTIVE BOX
CHIEF COMPLAINT:Patient is a 79y old  Female who presents with a chief complaint of altered mental status and slurred speech.      HPI:  79 years old Female patient from home with a PMHx of Afib ( not on any AC because of multiple falls), dementia, DM, HLD, HTN, BIB family to the ED for episode of AMS and slurred speech for 30mins today. Family reports patient went to sleep normally around 4pm and when she woke up, she had trouble making words and seemed to be very confused. States patient was back to normal when the ambulance arrived at the site. Patient has history of afib and is not on blood thinners because she is a fall risk. Daughters report patient fell, went to rehab facility and was just discharged home 2 weeks ago. Family noticed b/l leg swelling for the last few days. Denies fever, vomiting, diarrhea, cough or any other symptoms. Allergies: penicillin  [facility] had negative cardiac workup and negative EEG for seizure.  Patient is being admitted to telemetry for suspected TIA. NIHSS on presentation was 0. Patient passed bed side dysphagia screen. Started on aspirin and statin. (11 Mar 2020 00:46)      PAST MEDICAL & SURGICAL HISTORY:  Afib  Dementia  HLD (hyperlipidemia)  HTN (hypertension)  DM (diabetes mellitus)      MEDICATIONS  (STANDING):  aspirin  chewable 81 milliGRAM(s) Oral daily  atorvastatin 40 milliGRAM(s) Oral at bedtime  enoxaparin Injectable 40 milliGRAM(s) SubCutaneous daily  insulin glargine Injectable (LANTUS) 15 Unit(s) SubCutaneous at bedtime  insulin lispro (HumaLOG) corrective regimen sliding scale   SubCutaneous Before meals and at bedtime          FAMILY HISTORY:No hx of CAD      SOCIAL HISTORY:    [x ] Non-smoker    [x ] Alcohol-denies    Allergies    penicillin (Unknown)    Intolerances    	    REVIEW OF SYSTEMS:  CONSTITUTIONAL: No fever, weight loss, or fatigue  EYES: No eye pain, visual disturbances, or discharge  ENT:  No difficulty hearing, tinnitus, vertigo; No sinus or throat pain  NECK: No pain or stiffness  RESPIRATORY: No cough, wheezing, chills or hemoptysis; No Shortness of Breath  CARDIOVASCULAR: No chest pain, palpitations, passing out, dizziness, or leg swelling  GASTROINTESTINAL: No abdominal or epigastric pain. No nausea, vomiting, or hematemesis; No diarrhea or constipation. No melena or hematochezia.  GENITOURINARY: No dysuria, frequency, hematuria, or incontinence  NEUROLOGICAL: No headaches, memory loss, loss of strength, numbness, or tremors  SKIN: No itching, burning, rashes, or lesions   LYMPH Nodes: No enlarged glands  ENDOCRINE: No heat or cold intolerance; No hair loss  MUSCULOSKELETAL: No joint pain or swelling; No muscle, back, or extremity pain  PSYCHIATRIC: No depression, anxiety, mood swings, or difficulty sleeping  HEME/LYMPH: No easy bruising, or bleeding gums  ALLERGY AND IMMUNOLOGIC: No hives or eczema	        PHYSICAL EXAM:  T(C): 36.7 (03-11-20 @ 05:55), Max: 36.9 (03-11-20 @ 02:58)  HR: 55 (03-11-20 @ 05:55) (55 - 64)  BP: 144/74 (03-11-20 @ 05:55) (132/67 - 176/74)  RR: 18 (03-11-20 @ 05:55) (17 - 18)  SpO2: 96% (03-11-20 @ 05:55) (96% - 97%)  Wt(kg): --  I&O's Summary      Appearance: Normal	  HEENT:   Normal oral mucosa, PERRL, EOMI	  Lymphatic: No lymphadenopathy  Cardiovascular: Normal S1 S2, No JVD, No murmurs, No edema  Respiratory: Lungs clear to auscultation	  Psychiatry: A & O x 3, Mood & affect appropriate  Gastrointestinal:  Soft, Non-tender, + BS	  Skin: No rashes, No ecchymoses, No cyanosis	  Neurologic: Non-focal  Extremities: Normal range of motion, No clubbing, cyanosis or edema  Vascular: Peripheral pulses palpable 2+ bilaterally    	    ECG:  Normal sinus rhythm  Prolonged QT    	  	  LABS:	 	      CARDIAC MARKERS ( 11 Mar 2020 07:28 )  <0.015 ng/mL / x     / 48 U/L / x     / <1.0 ng/mL  CARDIAC MARKERS ( 10 Mar 2020 20:35 )  <0.015 ng/mL / x     / 62 U/L / x     / <1.0 ng/mL                        14.1   10.30 )-----------( 238      ( 11 Mar 2020 07:28 )             44.0     03-11    141  |  105  |  15  ----------------------------<  186<H>  3.8   |  30  |  0.72    Ca    8.8      11 Mar 2020 07:28  Phos  3.5     03-11  Mg     2.0     03-11    TPro  6.8  /  Alb  3.2<L>  /  TBili  0.5  /  DBili  0.1  /  AST  10  /  ALT  17  /  AlkPhos  81  03-11    proBNP: Serum Pro-Brain Natriuretic Peptide: 324 pg/mL (03-10 @ 20:35)    Lipid Profile: Cholesterol 154  LDL 82  HDL 42        TSH: Thyroid Stimulating Hormone, Serum: 2.01 uU/mL (03-11 @ 07:28)      EXAM:  CT BRAIN                            PROCEDURE DATE:  03/10/2020          INTERPRETATION:  CLINICAL INDICATION: Altered mental status.    TECHNIQUE: CT axial images of the head were obtained without intravenous contrast. Computer-reconstructed coronal and sagittal images were obtained.    COMPARISON: 1/30/2020.    FINDINGS: There is no acute intracranial hemorrhage, large cortical infarct, mass effect or midline shift. Nonspecific mild to moderate periventricular and subcortical white matter lucencies are unchanged and likely represent chronic microvascular ischemic changes. Punctate lucency in the left mark may represent artifact or age-indeterminate infarct. There is stable mild cerebral volume loss with ventricular dilatation. Cavus septum pellucidum and vergae.    There is no depressed skull fracture. Again noted is minimal sinus mucosal thickening with a left maxillary sinus mucus retention cyst. The tympanomastoid region is unremarkable.      IMPRESSION:     No acute intracranial hemorrhage, large cortical infarct or mass effect. Additional findings as described. If clinically indicated, short-term follow-up or MRI may be obtained for further evaluation.    OBSERVATIONS:  Mitral Valve: Normalmitral valve. Mild mitral  regurgitation.  Aortic Root: Aortic Root: 3.4 cm.    Aortic Valve: Normal trileaflet aortic valve.  Left Atrium: Severely dilated left atrium.  LA volume index  = 69 cc/m2.  Left Ventricle: Normal Left Ventricular Systolic Function,  (EF = 55 to 60%) No regional wall motion abnormalities.  Mild concentric left ventricular hypertrophy. Grade I  diastolic dysfunction (Impaired relaxation).  Right Heart: Normal right atrium. Normal right ventricular  size and function. There is trace tricuspid regurgitation.  There is mild pulmonic regurgitation.  Pericardium/PleuraNormal pericardium with no pericardial  effusion.  Hemodynamic: Unable to estimate RVSP.    IMPRESSIONS:Normal Study  * Negative ECG evidence of ischemia after IV of Lexiscan.  * Review of raw data shows: The study is of good technical  quality.  * The left ventricle was normal in size. Normal myocardial  perfusion scan, with no evidence of infarction or  inducible ischemia.  * Gated wall motion analysis is performed, and shows  normal wall motion with post stress LVEF of 64%.    ------------------------------------------------------------------------      ------------------------------------------------------------------------    Confirmed on  2/3/2020 - 11:40:53 at [place] by  Fany Askew MD

## 2020-03-12 NOTE — DISCHARGE NOTE PROVIDER - NSDCFUSCHEDAPPT_GEN_ALL_CORE_FT
BAILEY WAGGONER ; 05/08/2020 ; [facility] [address] [facility]  BAILEY WAGGONER ; 05/08/2020 ; [facility] [address] [facility]

## 2020-03-12 NOTE — DISCHARGE NOTE PROVIDER - NSDCMRMEDTOKEN_GEN_ALL_CORE_FT
aspirin 81 mg oral tablet, chewable: 1 tab(s) orally once a day  atorvastatin 40 mg oral tablet: 1 tab(s) orally once a day (at bedtime)  Drisdol 50,000 intl units (1.25 mg) oral capsule: 1 cap(s) orally once a week  insulin glargine: 30 unit(s) subcutaneous once a day (in the morning)  insulin lispro: 4 unit(s) subcutaneous 3 times a day (before meals)   losartan 25 mg oral tablet: 1 tab(s) orally 2 times a day  metFORMIN 1000 mg oral tablet: 1 tab(s) orally 2 times a day  metoprolol tartrate 25 mg oral tablet: 1 tab(s) orally 2 times a day aspirin 81 mg oral tablet, chewable: 1 tab(s) orally once a day  atorvastatin 40 mg oral tablet: 1 tab(s) orally once a day (at bedtime)  Drisdol 50,000 intl units (1.25 mg) oral capsule: 1 cap(s) orally once a week  insulin glargine: 30 unit(s) subcutaneous once a day (in the morning)  insulin lispro: 4 unit(s) subcutaneous 3 times a day (before meals)   losartan 25 mg oral tablet: 1 tab(s) orally 2 times a day  metoprolol tartrate 25 mg oral tablet: 1 tab(s) orally 2 times a day

## 2020-03-12 NOTE — PROGRESS NOTE ADULT - SUBJECTIVE AND OBJECTIVE BOX
CHIEF COMPLAINT:Patient is a 79y old  Female who presents with a chief complaint of altered mental status and slurred speech.Pt appears comfortable.    	  REVIEW OF SYSTEMS:  CONSTITUTIONAL: No fever, weight loss, or fatigue  EYES: No eye pain, visual disturbances, or discharge  ENT:  No difficulty hearing, tinnitus, vertigo; No sinus or throat pain  NECK: No pain or stiffness  RESPIRATORY: No cough, wheezing, chills or hemoptysis; No Shortness of Breath  CARDIOVASCULAR: No chest pain, palpitations, passing out, dizziness, or leg swelling  GASTROINTESTINAL: No abdominal or epigastric pain. No nausea, vomiting, or hematemesis; No diarrhea or constipation. No melena or hematochezia.  GENITOURINARY: No dysuria, frequency, hematuria, or incontinence  NEUROLOGICAL: No headaches, memory loss, loss of strength, numbness, or tremors  SKIN: No itching, burning, rashes, or lesions   LYMPH Nodes: No enlarged glands  ENDOCRINE: No heat or cold intolerance; No hair loss  MUSCULOSKELETAL: No joint pain or swelling; No muscle, back, or extremity pain  PSYCHIATRIC: No depression, anxiety, mood swings, or difficulty sleeping  HEME/LYMPH: No easy bruising, or bleeding gums  ALLERGY AND IMMUNOLOGIC: No hives or eczema	        PHYSICAL EXAM:  T(C): 36.4 (03-12-20 @ 05:23), Max: 36.7 (03-11-20 @ 13:46)  HR: 75 (03-12-20 @ 05:23) (54 - 83)  BP: 162/78 (03-12-20 @ 05:23) (127/74 - 172/79)  RR: 18 (03-12-20 @ 05:23) (18 - 18)  SpO2: 96% (03-12-20 @ 05:23) (95% - 96%)      Appearance: Normal	  HEENT:   Normal oral mucosa, PERRL, EOMI	  Lymphatic: No lymphadenopathy  Cardiovascular: Normal S1 S2, No JVD, No murmurs, No edema  Respiratory: Lungs clear to auscultation	  Psychiatry: A & O x 3, Mood & affect appropriate  Gastrointestinal:  Soft, Non-tender, + BS	  Skin: No rashes, No ecchymoses, No cyanosis	  Neurologic: Non-focal  Extremities: Normal range of motion, No clubbing, cyanosis or edema  Vascular: Peripheral pulses palpable 2+ bilaterally    MEDICATIONS  (STANDING):  aspirin  chewable 81 milliGRAM(s) Oral daily  atorvastatin 40 milliGRAM(s) Oral at bedtime  enoxaparin Injectable 40 milliGRAM(s) SubCutaneous daily  insulin glargine Injectable (LANTUS) 15 Unit(s) SubCutaneous at bedtime  insulin lispro (HumaLOG) corrective regimen sliding scale   SubCutaneous Before meals and at bedtime      	  	  LABS:	 	    CARDIAC MARKERS:  CARDIAC MARKERS ( 11 Mar 2020 07:28 )  <0.015 ng/mL / x     / 48 U/L / x     / <1.0 ng/mL  CARDIAC MARKERS ( 10 Mar 2020 20:35 )  <0.015 ng/mL / x     / 62 U/L / x     / <1.0 ng/mL                                15.0   10.50 )-----------( 250      ( 12 Mar 2020 07:29 )             45.4     03-12    140  |  104  |  15  ----------------------------<  217<H>  3.6   |  32<H>  |  0.79    Ca    9.2      12 Mar 2020 07:29  Phos  3.4     03-12  Mg     2.1     03-12    TPro  6.8  /  Alb  3.2<L>  /  TBili  0.5  /  DBili  0.1  /  AST  10  /  ALT  17  /  AlkPhos  81  03-11    proBNP: Serum Pro-Brain Natriuretic Peptide: 324 pg/mL (03-10 @ 20:35)    Lipid Profile: Cholesterol 154  LDL 82  HDL 42      HgA1c: Hemoglobin A1C, Whole Blood: 9.7 % (03-11 @ 09:39)    TSH: Thyroid Stimulating Hormone, Serum: 2.01 uU/mL (03-11 @ 07:28)

## 2020-03-12 NOTE — PROGRESS NOTE ADULT - PROBLEM SELECTOR PLAN 5
patient does have history of htn.  will hold htn medication  monitor blood pressure
patient does have history of htn.  will hold htn medication  monitor blood pressure

## 2020-03-12 NOTE — PROGRESS NOTE ADULT - PROBLEM SELECTOR PLAN 3
Patient has history of DM on lantus and humalog at home.  will resume lantus and inslun hss.  up titrate as needed.  a1c- 9.7
Patient has history of DM on lantus and humalog at home.  will resume lantus and inslun hss.  up titrate as needed.  f/u hba1c

## 2020-03-12 NOTE — EEG REPORT - NS EEG TEXT BOX
[facility]  Report of Routine EEG     [facility]: [address], [address] [place] [address], Phone: [phone]  [facility]: [address] [place] [id number], Phone: [phone]  Office: [address], [facility] [address], [place] [address], Phone: [phone]    Patient Name: BAILEY WAGGONER  Age: 79 year  : 1940  Patient ID: -, MRN #: [id number], Location: [address]  Referring Physician: DR ARGUETA  EEG #: [id number]    Study Date: 3/12/2020		    Technical Information:					  On Instrument: -  Placement and Labeling of Electrodes:  The EEG was performed utilizing 20 channels referential EEG connections (coronal over temporal over parasagittal montage) using all standard 10-20 electrode placements with EKG.  Recording was at a sampling rate of 256 samples per second per channel.  Dickson and seizure detection algorithms were utilized.    History:  79 LHF with transient episode of slurred speech with amnesia about event. Patient had a syncopal event last month.    Medication	  No AEDs	    Study Interpretation:  FINDINGS: The background was continuous, spontaneously variable and reactive. During wakefulness, the posterior dominant rhythm consisted of symmetric, well-modulated 7 Hz activity, which is lower than normal for age, with amplitude to 30 uV, that attenuated to eye opening.  Low amplitude frontal beta was noted in wakefulness.    Background Slowing:  No generalized background slowing was present.    Focal Slowing:   None were present.    Sleep Background:  Drowsiness and stage II sleep transients were not recorded.    Other Non-Epileptiform Findings:  None were present.    Interictal Epileptiform Activity:   None were present.      Events:  Clinical events: None recorded.  Seizures: None recorded.    Activation Procedures:   Hyperventilation was not performed.    Photic stimulation was not performed.    Artifacts:  Frequent myogenic and movement artifacts were noted.    ECG:  The heart rate on single channel ECG was predominantly between 60 and 70 BPM.    EEG Summary/Classification:  Abnormal EEG in the awake state.  - Moderate generalized background slowing.     EEG Impression/Clinical Correlate:    Abnormal EEG study.    Generalized background slowing is a nonspecific finding that can be seen in the setting of diffuse or multifocal structural abnormalities of the brain (including anoxic brain injury), toxic or metabolic encephalopathy, medication side effect, or infection.  No evidence of seizure tendency was identified.  A repeat study preceded by sleep deprivation may be considered to help capture the drowsy and asleep states.      ________________________________________    Frank Argueta MD  Attending Physician, [facility] [place]

## 2020-03-12 NOTE — PROGRESS NOTE ADULT - PROBLEM SELECTOR PLAN 6
patient has history of dementia.  symptomatic managment
patient has history of dementia.  symptomatic managment

## 2020-03-12 NOTE — DISCHARGE NOTE PROVIDER - HOSPITAL COURSE
HPI:    79 years old Female patient from home with a PMHx of Afib ( not on any AC because of multiple falls), dementia, DM, HLD, HTN, BIB family to the ED for episode of AMS and slurred speech for 30mins today. Family reports patient went to sleep normally around 4pm and when she woke up, she had trouble making words and seemed to be very confused. States patient was back to normal when the ambulance arrived at the site. Patient has history of afib and is not on blood thinners because she is a fall risk. Daughters report patient fell, went to rehab facility and was just discharged home 2 weeks ago. Family noticed b/l leg swelling for the last few days. Denies fever, vomiting, diarrhea, cough or any other symptoms. Allergies: penicillin    Patient was recently admited to [facility] for syncope and had negative cardiac workup and negative EEG for seizure.    Patient is being admitted to telemetry for suspected TIA. NIHSS on presentation was 0. Patient passed bed side dysphagia screen. Started on aspirin and statin.     Pt had a detailed evaluation for symptoms here. HPI:    79 years old Female patient from home with a PMHx of Afib ( not on any AC because of multiple falls), dementia, DM, HLD, HTN, BIB family to the ED for episode of AMS and slurred speech for 30mins today. Family reports patient went to sleep normally around 4pm and when she woke up, she had trouble making words and seemed to be very confused. States patient was back to normal when the ambulance arrived at the site. Patient has history of afib and is not on blood thinners because she is a fall risk. Daughters report patient fell, went to rehab facility and was just discharged home 2 weeks ago. Family noticed b/l leg swelling for the last few days. Denies fever, vomiting, diarrhea, cough or any other symptoms. Allergies: penicillin    [facility] and had negative cardiac workup and negative EEG for seizure.    Patient is being admitted to telemetry for suspected TIA. NIHSS on presentation was 0. Patient passed bed side dysphagia screen. Started on aspirin and statin.     Pt had a detailed evaluation for symptoms here. Symptoms had resolved on hospital admission. CT brain was done that showed no acute changes. Neurology was consulted and was followed during the hospital stay. EEG was recommended. Pt able to ambulate on without assistance.  Cardiology consult was requested and followed during the hospital stya. Managed here aspirin and statins. Recommended to follow up with PCP within a week from discharge.    Pt has past history of Afibrillation. No anticoagulant was given due to multiple episodes of fall. Cardiology was consulted and followed during the hospital stay. HPI:    79 years old Female patient from home with a PMHx of Afib ( not on any AC because of multiple falls), dementia, DM, HLD, HTN, BIB family to the ED for episode of AMS and slurred speech for 30mins today. Family reports patient went to sleep normally around 4pm and when she woke up, she had trouble making words and seemed to be very confused. States patient was back to normal when the ambulance arrived at the site. Patient has history of afib and is not on blood thinners because she is a fall risk. Daughters report patient fell, went to rehab facility and was just discharged home 2 weeks ago. Family noticed b/l leg swelling for the last few days. Denies fever, vomiting, diarrhea, cough or any other symptoms. Allergies: penicillin    Patient was recently admited to [facility] for syncope and had negative cardiac workup and negative EEG for seizure.    Patient is being admitted to telemetry for suspected TIA. NIHSS on presentation was 0. Patient passed bed side dysphagia screen. Started on aspirin and statin.     Pt had a detailed evaluation for symptoms here. Symptoms had resolved on hospital admission. CT brain was done that showed no acute changes. Neurology was consulted and was followed during the hospital stay. EEG was recommended that showed no evidence of any seizures. Pt able to ambulate on without assistance.  Cardiology consult was requested and followed during the hospital stya. Managed here aspirin and statins. Recommended to follow up with PCP within a week from discharge.    Pt has past history of Afibrillation. No anticoagulant was given due to multiple episodes of fall. Cardiology was consulted and followed during the hospital stay.     Recommended to have home Physical therapy.

## 2020-03-12 NOTE — PROGRESS NOTE ADULT - ATTENDING COMMENTS
I counseled the patient about the likely diagnosis of syncopal event, and the results of her studies.

## 2020-03-12 NOTE — PROGRESS NOTE ADULT - SUBJECTIVE AND OBJECTIVE BOX
Time of Visit:  Patient seen and examined.     MEDICATIONS  (STANDING):  aspirin  chewable 81 milliGRAM(s) Oral daily  atorvastatin 40 milliGRAM(s) Oral at bedtime  enoxaparin Injectable 40 milliGRAM(s) SubCutaneous daily  insulin glargine Injectable (LANTUS) 15 Unit(s) SubCutaneous at bedtime  insulin lispro (HumaLOG) corrective regimen sliding scale   SubCutaneous Before meals and at bedtime  losartan 25 milliGRAM(s) Oral two times a day  metoprolol tartrate 25 milliGRAM(s) Oral two times a day      MEDICATIONS  (PRN):       Medications up to date at time of exam.    ROS; No fever, chills, cough, congestion.   PHYSICAL EXAMINATION:    Vital Signs Last 24 Hrs  T(C): 36.6 (12 Mar 2020 13:51), Max: 36.7 (12 Mar 2020 10:56)  T(F): 97.9 (12 Mar 2020 13:51), Max: 98 (12 Mar 2020 10:56)  HR: 57 (12 Mar 2020 13:51) (57 - 83)  BP: 90/59 (12 Mar 2020 13:51) (90/59 - 172/79)  BP(mean): --  RR: 16 (12 Mar 2020 13:51) (16 - 18)  SpO2: 96% (12 Mar 2020 13:51) (95% - 99%)   (if applicable)    General: Alert and oriented, forgetful . Able to answer question with no SOB. No acute distress.       HEENT: Head is normocephalic and atraumatic. No nasal tenderness . Extraocular muscles are intact. Mucous membranes are moist.     NECK: Supple, no palpable adenopathy.    LUNGS: Clear to auscultation, no wheezing, rales, or rhonchi. No use of accessory muscle.     HEART: S1 S2 Regular rate and no click/ rub.     ABDOMEN: Soft, nontender, and nondistended.  No abdominal guarding . Active bowel sounds .    EXTREMITIES: Without any cyanosis, clubbing, rash, lesions or edema. Can ambulate with no device.     NEUROLOGIC: Awake, alert, oriented, forgetful. No tremors.      SKIN: Warm and moist. Non diaphoretic.       LABS:                        15.0   10.50 )-----------( 250      ( 12 Mar 2020 07:29 )             45.4     03-12    140  |  104  |  15  ----------------------------<  217<H>  3.6   |  32<H>  |  0.79    Ca    9.2      12 Mar 2020 07:29  Phos  3.4     03-12  Mg     2.1     -12    TPro  6.8  /  Alb  3.2<L>  /  TBili  0.5  /  DBili  0.1  /  AST  10  /  ALT  17  /  AlkPhos  81  03-11    PT/INR - ( 10 Mar 2020 20:35 )   PT: 11.4 sec;   INR: 1.03 ratio         PTT - ( 10 Mar 2020 20:35 )  PTT:35.6 sec  Urinalysis Basic - ( 10 Mar 2020 23:31 )    Color: Yellow / Appearance: Clear / S.020 / pH: x  Gluc: x / Ketone: Negative  / Bili: Negative / Urobili: Negative   Blood: x / Protein: Negative / Nitrite: Negative   Leuk Esterase: Negative / RBC: 2-5 /HPF / WBC 0-2 /HPF   Sq Epi: x / Non Sq Epi: Few /HPF / Bacteria: Moderate /HPF        CARDIAC MARKERS ( 11 Mar 2020 07:28 )  <0.015 ng/mL / x     / 48 U/L / x     / <1.0 ng/mL  CARDIAC MARKERS ( 10 Mar 2020 20:35 )  <0.015 ng/mL / x     / 62 U/L / x     / <1.0 ng/mL        Serum Pro-Brain Natriuretic Peptide: 324 pg/mL (03-10-20 @ 20:35)          MICROBIOLOGY: (if applicable)    RADIOLOGY & ADDITIONAL STUDIES:  EKG:   CXR: < from: Xray Chest 1 View AP/PA (03.10.20 @ 21:00) >  ROCEDURE DATE:  03/10/2020          INTERPRETATION:  AP erect chest on March 10, 2020 at 8:42 PM. Patient has altered mental status.    Heart is enlarged.    The lung fields and pleural surfaces are unremarkable.    Chest is similar to  of this year.    IMPRESSION: Cardiomegaly again noted.      IMPRESSION: 79y Female PAST MEDICAL & SURGICAL HISTORY:  Afib  Dementia  HLD (hyperlipidemia)  HTN (hypertension)  DM (diabetes mellitus)     Impression; 80 Y/O Female recently admited to [facility] for syncope and had negative cardiac workup and negative EEG for seizure. Presented with AMS, slurred speech for 30mins today.   Patient is being admitted to telemetry for suspected TIA. NIHSS on presentation was 0. Patient passed bed side dysphagia screen. Started on aspirin and statin. Had episode of HASTINGS due to deconditioning. Encephalopathy, cause unclear.      Suggestion;  O2 saturation 96% room air. No need for continuous Oxygen supplementation.  Pulmonary oral hygiene care.   DVT/ GI prophylactic.   On ASA 81 mg , Atorvastatin 40 mg oral daily. Time of Visit:  Patient seen and examined.     MEDICATIONS  (STANDING):  aspirin  chewable 81 milliGRAM(s) Oral daily  atorvastatin 40 milliGRAM(s) Oral at bedtime  enoxaparin Injectable 40 milliGRAM(s) SubCutaneous daily  insulin glargine Injectable (LANTUS) 15 Unit(s) SubCutaneous at bedtime  insulin lispro (HumaLOG) corrective regimen sliding scale   SubCutaneous Before meals and at bedtime  losartan 25 milliGRAM(s) Oral two times a day  metoprolol tartrate 25 milliGRAM(s) Oral two times a day      MEDICATIONS  (PRN):       Medications up to date at time of exam.    ROS; No fever, chills, cough, congestion.   PHYSICAL EXAMINATION:    Vital Signs Last 24 Hrs  T(C): 36.6 (12 Mar 2020 13:51), Max: 36.7 (12 Mar 2020 10:56)  T(F): 97.9 (12 Mar 2020 13:51), Max: 98 (12 Mar 2020 10:56)  HR: 57 (12 Mar 2020 13:51) (57 - 83)  BP: 90/59 (12 Mar 2020 13:51) (90/59 - 172/79)  BP(mean): --  RR: 16 (12 Mar 2020 13:51) (16 - 18)  SpO2: 96% (12 Mar 2020 13:51) (95% - 99%)   (if applicable)    General: Alert and oriented, forgetful . Able to answer question with no SOB. No acute distress.       HEENT: Head is normocephalic and atraumatic. No nasal tenderness . Extraocular muscles are intact. Mucous membranes are moist.     NECK: Supple, no palpable adenopathy.    LUNGS: Clear to auscultation, no wheezing, rales, or rhonchi. No use of accessory muscle.     HEART: S1 S2 Regular rate and no click/ rub.     ABDOMEN: Soft, nontender, and nondistended.  No abdominal guarding . Active bowel sounds .    EXTREMITIES: Without any cyanosis, clubbing, rash, lesions or edema. Can ambulate with no device.     NEUROLOGIC: Awake, alert, oriented, forgetful. No tremors.      SKIN: Warm and moist. Non diaphoretic.       LABS:                        15.0   10.50 )-----------( 250      ( 12 Mar 2020 07:29 )             45.4     03-12    140  |  104  |  15  ----------------------------<  217<H>  3.6   |  32<H>  |  0.79    Ca    9.2      12 Mar 2020 07:29  Phos  3.4     03-12  Mg     2.1     -12    TPro  6.8  /  Alb  3.2<L>  /  TBili  0.5  /  DBili  0.1  /  AST  10  /  ALT  17  /  AlkPhos  81  03-11    PT/INR - ( 10 Mar 2020 20:35 )   PT: 11.4 sec;   INR: 1.03 ratio         PTT - ( 10 Mar 2020 20:35 )  PTT:35.6 sec  Urinalysis Basic - ( 10 Mar 2020 23:31 )    Color: Yellow / Appearance: Clear / S.020 / pH: x  Gluc: x / Ketone: Negative  / Bili: Negative / Urobili: Negative   Blood: x / Protein: Negative / Nitrite: Negative   Leuk Esterase: Negative / RBC: 2-5 /HPF / WBC 0-2 /HPF   Sq Epi: x / Non Sq Epi: Few /HPF / Bacteria: Moderate /HPF        CARDIAC MARKERS ( 11 Mar 2020 07:28 )  <0.015 ng/mL / x     / 48 U/L / x     / <1.0 ng/mL  CARDIAC MARKERS ( 10 Mar 2020 20:35 )  <0.015 ng/mL / x     / 62 U/L / x     / <1.0 ng/mL        Serum Pro-Brain Natriuretic Peptide: 324 pg/mL (03-10-20 @ 20:35)          MICROBIOLOGY: (if applicable)    RADIOLOGY & ADDITIONAL STUDIES:  EKG:   CXR: < from: Xray Chest 1 View AP/PA (03.10.20 @ 21:00) >  ROCEDURE DATE:  03/10/2020          INTERPRETATION:  AP erect chest on March 10, 2020 at 8:42 PM. Patient has altered mental status.    Heart is enlarged.    The lung fields and pleural surfaces are unremarkable.    Chest is similar to  of this year.    IMPRESSION: Cardiomegaly again noted.      IMPRESSION: 79y Female PAST MEDICAL & SURGICAL HISTORY:  Afib  Dementia  HLD (hyperlipidemia)  HTN (hypertension)  DM (diabetes mellitus)     Impression; 78 Y/O Female recently admited to [facility] for syncope and had negative cardiac workup and negative EEG for seizure. Presented with AMS, slurred speech for 30mins today.   Patient is being admitted to telemetry for suspected TIA. NIHSS on presentation was 0. Patient passed bed side dysphagia screen. Started on aspirin and statin. Had episode of HASTINGS due to deconditioning. Encephalopathy, cause unclear.      Suggestion;  O2 saturation 96% room air. No need for continuous Oxygen supplementation.  Pulmonary oral hygiene care.   DVT/ GI prophylactic.   On ASA 81 mg , Atorvastatin 40 mg oral daily.        Agree with above assessment and plan as transcribed.

## 2020-03-12 NOTE — PROGRESS NOTE ADULT - PROBLEM SELECTOR PLAN 7
IMPROVE VTE Individual Risk Assessment  RISK                                                          Points  [] Previous VTE                                           3  [] Thrombophilia                                        2  [] Lower limb paralysis                              2   [] Current Cancer                                       2   [] Immobilization > 24 hrs                        1  [] ICU/CCU stay > 24 hours                       1  [] Age > 60                                                   1  IMPROVE VTE Score = hep sc   for DVT chemoprophylaxis
IMPROVE VTE Individual Risk Assessment  RISK                                                          Points  [] Previous VTE                                           3  [] Thrombophilia                                        2  [] Lower limb paralysis                              2   [] Current Cancer                                       2   [] Immobilization > 24 hrs                        1  [] ICU/CCU stay > 24 hours                       1  [] Age > 60                                                   1  IMPROVE VTE Score = hep sc   for DVT chemoprophylaxis

## 2020-03-12 NOTE — PROGRESS NOTE ADULT - SUBJECTIVE AND OBJECTIVE BOX
PGY 1 Note discussed with supervising resident and primary attending    Patient is a 79y old  Female who presents with a chief complaint of altered mental status and slurred speech (12 Mar 2020 11:43)      INTERVAL HPI/OVERNIGHT EVENTS: Pt had EEG- gen abnormal slowing.     MEDICATIONS  (STANDING):  aspirin  chewable 81 milliGRAM(s) Oral daily  atorvastatin 40 milliGRAM(s) Oral at bedtime  enoxaparin Injectable 40 milliGRAM(s) SubCutaneous daily  insulin glargine Injectable (LANTUS) 15 Unit(s) SubCutaneous at bedtime  insulin lispro (HumaLOG) corrective regimen sliding scale   SubCutaneous Before meals and at bedtime  losartan 25 milliGRAM(s) Oral two times a day  metoprolol tartrate 25 milliGRAM(s) Oral two times a day    MEDICATIONS  (PRN):      __________________________________________________  REVIEW OF SYSTEMS:    CONSTITUTIONAL: No fever,  EYES: no acute visual disturbances  NECK: No pain or stiffness  RESPIRATORY: No cough; No shortness of breath  CARDIOVASCULAR: No chest pain, no palpitations  GASTROINTESTINAL: No pain. No nausea or vomiting; No diarrhea   NEUROLOGICAL: No headache or numbness, no tremors  MUSCULOSKELETAL: No joint pain, no muscle pain  GENITOURINARY: no dysuria, no frequency, no hesitancy  PSYCHIATRY: no depression , no anxiety  ALL OTHER  ROS negative        Vital Signs Last 24 Hrs  T(C): 36.6 (12 Mar 2020 13:51), Max: 36.7 (12 Mar 2020 10:56)  T(F): 97.9 (12 Mar 2020 13:51), Max: 98 (12 Mar 2020 10:56)  HR: 57 (12 Mar 2020 13:51) (57 - 83)  BP: 90/59 (12 Mar 2020 13:51) (90/59 - 172/79)  BP(mean): --  RR: 16 (12 Mar 2020 13:51) (16 - 18)  SpO2: 96% (12 Mar 2020 13:51) (95% - 99%)    ________________________________________________  PHYSICAL EXAM:  GENERAL: old, well built.   HEENT: Normocephalic;  conjunctivae and sclerae clear; moist mucous membranes;   NECK : supple  CHEST/LUNG: Clear to auscultation bilaterally with good air entry   HEART: S1 S2  regular; no murmurs, gallops or rubs  ABDOMEN: Soft, Nontender, Nondistended; Bowel sounds present  EXTREMITIES: no cyanosis; no edema; no calf tenderness  SKIN: warm and dry; no rash  NERVOUS SYSTEM:  Awake and alert; Oriented  to place, person and time ; no new deficits    _________________________________________________  LABS:                        15.0   10.50 )-----------( 250      ( 12 Mar 2020 07:29 )             45.4     03-12    140  |  104  |  15  ----------------------------<  217<H>  3.6   |  32<H>  |  0.79    Ca    9.2      12 Mar 2020 07:29  Phos  3.4     03-12  Mg     2.1     03-12    TPro  6.8  /  Alb  3.2<L>  /  TBili  0.5  /  DBili  0.1  /  AST  10  /  ALT  17  /  AlkPhos  81  03-11    PT/INR - ( 10 Mar 2020 20:35 )   PT: 11.4 sec;   INR: 1.03 ratio         PTT - ( 10 Mar 2020 20:35 )  PTT:35.6 sec  Urinalysis Basic - ( 10 Mar 2020 23:31 )    Color: Yellow / Appearance: Clear / S.020 / pH: x  Gluc: x / Ketone: Negative  / Bili: Negative / Urobili: Negative   Blood: x / Protein: Negative / Nitrite: Negative   Leuk Esterase: Negative / RBC: 2-5 /HPF / WBC 0-2 /HPF   Sq Epi: x / Non Sq Epi: Few /HPF / Bacteria: Moderate /HPF      CAPILLARY BLOOD GLUCOSE      POCT Blood Glucose.: 173 mg/dL (12 Mar 2020 11:42)  POCT Blood Glucose.: 209 mg/dL (12 Mar 2020 07:35)  POCT Blood Glucose.: 252 mg/dL (11 Mar 2020 22:54)  POCT Blood Glucose.: 214 mg/dL (11 Mar 2020 22:23)  POCT Blood Glucose.: 152 mg/dL (11 Mar 2020 16:44)        RADIOLOGY & ADDITIONAL TESTS:  EEG Impression/Clinical Correlate:    Abnormal EEG study.    Generalized background slowing is a nonspecific finding that can be seen in the setting of diffuse or multifocal structural abnormalities of the brain (including anoxic brain injury), toxic or metabolic encephalopathy, medication side effect, or infection.  No evidence of seizure tendency was identified.  A repeat study preceded by sleep deprivation may be considered to help capture the drowsy and asleep states.      Imaging Personally Reviewed:  YES/NO    Consultant(s) Notes Reviewed:   YES/ No    Care Discussed with Consultants :     Plan of care was discussed with patient and /or primary care giver; all questions and concerns were addressed and care was aligned with patient's wishes.

## 2020-03-12 NOTE — DISCHARGE NOTE PROVIDER - NSDCCPCAREPLAN_GEN_ALL_CORE_FT
PRINCIPAL DISCHARGE DIAGNOSIS  Diagnosis: TIA (transient ischemic attack)  Assessment and Plan of Treatment: You had come in to the ED with complaints of altered mental status and difficulty with speech. You had a detailed evaluation for symptoms here. Symptoms had resolved on hospital admission. CT brain was done that showed no acute changes. Neurology was consulted and was followed during the hospital stay. EEG was recommended. You were  able to ambulate on without assistance.  Cardiology consult was requested and followed during the hospital stay. Managed here aspirin and statins. Recommended to follow up with PCP within a week from discharge.  You have a  past history of Afibrillation. No anticoagulant was given due to multiple episodes of fall. Cardiology was consulted and followed during the hospital stay.      SECONDARY DISCHARGE DIAGNOSES  Diagnosis: Afib  Assessment and Plan of Treatment: You have a  past history of Afibrillation. No anticoagulant was given due to multiple episodes of fall. Cardiology was consulted and followed during the hospital stay.    Diagnosis: HLD (hyperlipidemia)  Assessment and Plan of Treatment: You have past medical history of HLD (hyperlipidemia). managed here with statins. Lipid profile showed mild decreased HDL.    Diagnosis: HTN (hypertension)  Assessment and Plan of Treatment: Continue with blood pressure medication. Maintain a healthy diet that consist of low sugar, low fat, low sodium diet. Exercise frequently if possible.  Follow up with primary care physician in one week after discharge.

## 2020-03-12 NOTE — PROGRESS NOTE ADULT - PROBLEM SELECTOR PLAN 2
Patient does have history of A fib but not on any AC.  because of multiple falls, patient and family decided not to be on AC despite high KRISTINA VASC score.   cardio eval- recc aspirin and statin.
Patient does have history of A fib but not on any AC.  because of multiple falls, patient and family decided not to be on AC despite high KRISTINA VASC score.  f/u cardio eval

## 2020-03-12 NOTE — PROGRESS NOTE ADULT - PROBLEM SELECTOR PLAN 1
Patient came to ED because family noted slurred speech and AMS.  NIHSS score on arrival was 0.  Patient is being admitted to telemetry for TIA.  CT head is negative for any acute bleed or stroke.  Patient does have history of A fib but not on any AC.  No neurological deficit on examination.  patient passed bedside speech and swallow.  started on aspirin and statin.  Repeat CT head in case of any change in mental status or neurological exam  Neuro - recc EEG   EEG- generalized slowing, no evidence of seizures
Patient came to ED because family noted slurred speech and AMS.  NIHSS score on arrival was 0.  Patient is being admitted to telemetry for TIA.  CT head is negative for any acute bleed or stroke.  Patient does have history of A fib but not on any AC.  No neurological deficit on examination.  patient passed bedside speech and swallow.  started on aspirin and statin.  Repeat CT head in case of any change in mental status or neurological exam  F/U Neuro eval

## 2020-03-12 NOTE — DISCHARGE NOTE PROVIDER - REASON FOR ADMISSION
altered mental status and slurred speech IMPROVE VTE Individual Risk Assessment          RISK                                                          Points  [  ] Previous VTE                                                3  [  ] Thrombophilia                                             2  [ x ] Lower limb paralysis                                   2        (unable to hold up >15 seconds)    [  ] Current Cancer                                             2         (within 6 months)  [ x ] Immobilization > 24 hrs                              1  [  ] ICU/CCU stay > 24 hours                             1  [ x ] Age > 60                                                         1    IMPROVE VTE Score: 3  DVT ppx: heparin 5000 sq q8    11. Prolonged QTc- 503 ms. Check AM EKG. Avoid meds that may prolong QTc

## 2020-03-12 NOTE — PROGRESS NOTE ADULT - SUBJECTIVE AND OBJECTIVE BOX
No new neurological events overnight.    Stable neurological exam.    EEG shows no seizure tendency.    Assessment: Syncopal event, possibly TIA    Recs:  - Continue aspirin 81mg daily and atorvastatin 40mg QHS for secondary stroke prevention (given chronic infarct)  - Plentiful fluid hydration  - Caution with position changes  - PT/OT      NOTE TO BE COMPLETED - PLEASE REFER TO ABOVE ONLY AND IGNORE INFORMATION BELOW    Neurology Follow up note    Name  BAILEY WAGGONER    HPI:  79 years old Female patient from home with a PMHx of Afib ( not on any AC because of multiple falls), dementia, DM, HLD, HTN, BIB family to the ED for episode of AMS and slurred speech for 30mins today. Family reports patient went to sleep normally around 4pm and when she woke up, she had trouble making words and seemed to be very confused. States patient was back to normal when the ambulance arrived at the site. Patient has history of afib and is not on blood thinners because she is a fall risk. Daughters report patient fell, went to rehab facility and was just discharged home 2 weeks ago. Family noticed b/l leg swelling for the last few days. Denies fever, vomiting, diarrhea, cough or any other symptoms. Allergies: penicillin  Patient was recently admited to [facility] for syncope and had negative cardiac workup and negative EEG for seizure.  Patient is being admitted to telemetry for suspected TIA. NIHSS on presentation was 0. Patient passed bed side dysphagia screen. Started on aspirin and statin. (11 Mar 2020 00:46)      Interval History -        Subjective:        MEDICATIONS  (STANDING):  aspirin  chewable 81 milliGRAM(s) Oral daily  atorvastatin 40 milliGRAM(s) Oral at bedtime  enoxaparin Injectable 40 milliGRAM(s) SubCutaneous daily  insulin glargine Injectable (LANTUS) 15 Unit(s) SubCutaneous at bedtime  insulin lispro (HumaLOG) corrective regimen sliding scale   SubCutaneous Before meals and at bedtime  losartan 25 milliGRAM(s) Oral two times a day  metoprolol tartrate 25 milliGRAM(s) Oral two times a day    MEDICATIONS  (PRN):      Allergies    penicillin (Unknown)    Intolerances        Review of Systems:  General: [ ] None, [ ] chills, [ ]fatigue, [ ] fevers  Skin: [ ] None, [ ] rash   HEENT: [ ] None, [ ] head injury, [ ] blurred vision, [ ] double vision, [ ] eye pain, [ ] visual loss, [ ] hearing loss, [ ] deafness, [ ] ear pain, [ ] ringing in the ears, [ ] vertigo, [ ] sinus pain, [ ] voice changes  Neck: [ ] None, [ ] neck stiffness  Respiratory: [ ] None, [ ] cough, [ ] difficulty breathing  Cardiovascular: [ ] None, [ ] calf cramps, [ ] chest pain, [ ] leg pain, [ ] swelling, [ ] rapid heart rate, [ ] shortness of breath  Gastrointestinal: [ ] None, [ ] abdominal pain, [ ] nausea, [ ] vomiting  Musculoskeletal: [ ] None, [ ] back pain, [ ] joint pain, [ ] joint stiffness, [ ] leg cramps, [ ] muscle atrophy, [ ] muscle cramps, [ ] muscle weakness, [ ] swelling of extremities  Neurological: [ ] None, [ ] Dizziness, [ ] decreased memory, [ ] fainting, [ ] focal neurological symptoms, [ ] headaches, [ ] incontinence of stool, [ ] incontinence of urine, [ ] loss of consciousness, [ ] numbness, [ ] seizures, [ ] spinning sensation, [ ] stroke, [ ] trouble walking, [ ] unsteadiness, [ ] visual changes, [ ] weakness  Psychiatric: [ ] None,  [ ] depression, [ ] anxiety, [ ] hallucinations, [ ] inability to concentrate, [ ] mood changes, [ ] panic attacks  Hematology: [ ] None,  [ ] blood clots, [ ] spontaneous bleeding      Objective:   Vital Signs Last 24 Hrs  T(C): 36.6 (12 Mar 2020 13:51), Max: 36.7 (12 Mar 2020 10:56)  T(F): 97.9 (12 Mar 2020 13:51), Max: 98 (12 Mar 2020 10:56)  HR: 74 (12 Mar 2020 18:05) (57 - 83)  BP: 129/52 (12 Mar 2020 18:05) (90/59 - 172/79)  BP(mean): --  RR: 16 (12 Mar 2020 13:51) (16 - 18)  SpO2: 96% (12 Mar 2020 13:51) (95% - 99%)    General Exam:   General appearance: No acute distress                 Cardiovascular: Pedal dorsalis pulses intact bilaterally    Neurological Exam:  Mental Status: Orientated to self, date and place.  Attention intact.  No dysarthria, aphasia or neglect.  Knowledge intact.  Registration intact.  Short and long term memory grossly intact.      Cranial Nerves: CN I - not tested.  PERRL, EOMI, VFF, no nystagmus or diplopia.  No APD.  Fundi not visualized bilaterally.  CN V1-3 intact to light touch and pinprick.  No facial asymmetry.  Hearing intact to finger rub bilaterally.  Tongue, uvula and palate midline.  Sternocleidomastoid and Trapezius intact bilaterally.    Motor:   Tone: normal.                  Strength: intact throughout  Pronator drift: none                 Dysmeria: None to finger-nose-finger or heel-shin-heel  No truncal ataxia.    Tremor: No resting, postural or action tremor.  No myoclonus.    Sensation: intact to light touch, pinprick, vibration and proprioception    Deep Tendon Reflexes: 1+ bilateral biceps, triceps, brachioradialis, knee and ankle  Toes flexor bilaterally    Gait: normal and stable.      Other:    03-12    140  |  104  |  15  ----------------------------<  217<H>  3.6   |  32<H>  |  0.79    Ca    9.2      12 Mar 2020 07:29  Phos  3.4     03-12  Mg     2.1     03-12    TPro  6.8  /  Alb  3.2<L>  /  TBili  0.5  /  DBili  0.1  /  AST  10  /  ALT  17  /  AlkPhos  81  03-11        Radiology    EKG:  tele:  TTE:  EEG:                 Please contact the Neurology consult service with any questions.    Frank Mcknight MD   of Neurology  [facility] Neurology Follow up note    Name  BAILEY WAGGONER    HPI:  79 years old Female patient from home with a PMHx of Afib ( not on any AC because of multiple falls), dementia, DM, HLD, HTN, BIB family to the ED for episode of AMS and slurred speech for 30mins today. Family reports patient went to sleep normally around 4pm and when she woke up, she had trouble making words and seemed to be very confused. States patient was back to normal when the ambulance arrived at the site. Patient has history of afib and is not on blood thinners because she is a fall risk. Daughters report patient fell, went to rehab facility and was just discharged home 2 weeks ago. Family noticed b/l leg swelling for the last few days. Denies fever, vomiting, diarrhea, cough or any other symptoms. Allergies: penicillin  Patient was recently admited to [facility] for syncope and had negative cardiac workup and negative EEG for seizure.  Patient is being admitted to telemetry for suspected TIA. NIHSS on presentation was 0. Patient passed bed side dysphagia screen. Started on aspirin and statin. (11 Mar 2020 00:46)    NEURO HPI:  79 LHF with history of syncopal event with fall in February 2020, presents with new episode of confusion with slurred speech lasting a few minutes. The patient does not remember this event.    Interval History -  No new neurological events overnight.    MEDICATIONS  (STANDING):  aspirin  chewable 81 milliGRAM(s) Oral daily  atorvastatin 40 milliGRAM(s) Oral at bedtime  enoxaparin Injectable 40 milliGRAM(s) SubCutaneous daily  insulin glargine Injectable (LANTUS) 15 Unit(s) SubCutaneous at bedtime  insulin lispro (HumaLOG) corrective regimen sliding scale   SubCutaneous Before meals and at bedtime  losartan 25 milliGRAM(s) Oral two times a day  metoprolol tartrate 25 milliGRAM(s) Oral two times a day    Allergies  penicillin (Unknown)    Review of Systems: Fourteen systems reviewed and negative except as in HPI / Interval History.      Objective:   Vital Signs Last 24 Hrs  T(C): 36.6 (12 Mar 2020 13:51), Max: 36.7 (12 Mar 2020 10:56)  T(F): 97.9 (12 Mar 2020 13:51), Max: 98 (12 Mar 2020 10:56)  HR: 74 (12 Mar 2020 18:05) (57 - 83)  BP: 129/52 (12 Mar 2020 18:05) (90/59 - 172/79)  RR: 16 (12 Mar 2020 13:51) (16 - 18)  SpO2: 96% (12 Mar 2020 13:51) (95% - 99%)    General Exam:  General: No acute distress  Respiratory: CTAB/l.  No crackles, rhonchi, or wheezes.  Cardiovascular: RRR, No murmurs, Full b/l radial and pedal pulses  Musculoskeletal: Reconstructed digits present in both hands    Neurological Exam:  General / Mental Status: Oriented to person, place, and time.  No dysarthria or aphasia present.  Naming and repetition intact.  Cranial Nerves: PERRLA, EOMI x 2, VFF x 4, No nystagmus or diplopia.  B/l V1-V3 equal to light touch and pinprick.  Symmetric facial movement and palate elevation.  B/l hearing equal to finger rub.  Negative Holdenville-Hallpike maneuver b/l.  5/5 strength with b/l sternocleidomastoid and trapezius.  Midline tongue protrusion with no atrophy or fasciculations.  Motor: Normal bulk and tone in all four extremities.  5/5 strength throughout all four extremities.  No downward drift, rigidity, spasticity, or tremors in any of the four extremities.  Sensation: Intact to light touch and pinprick in all four extremities.  Coordination: No dysmetria with b/l finger-to-nose and heel raise tests.  Reflexes: 2+ and symmetric at b/l biceps, triceps, brachioradialis, patellae, and ankles.  Toes flexor b/l.  Gait and Romberg testing deferred per patient request.      Labs:    03-12    140  |  104  |  15  ----------------------------<  217<H>  3.6   |  32<H>  |  0.79    Ca    9.2      12 Mar 2020 07:29  Phos  3.4     03-12  Mg     2.1     03-12    TPro  6.8  /  Alb  3.2<L>  /  TBili  0.5  /  DBili  0.1  /  AST  10  /  ALT  17  /  AlkPhos  81  03-11    03-11 [id number] 9.7    Lipid Profile (03.11.20 @ 07:28)    Total Cholesterol/HDL Ratio Measurement: 3.7 RATIO    Cholesterol, Serum: 154 mg/dL    Triglycerides, Serum: 149 mg/dL    HDL Cholesterol, Serum: 42 mg/dL    Direct LDL: 82 mg/dL      Neuroimaging:    CT Head (3/10/20):  - Chronic left pontine infarct  - Chronic microvascular disease  - Mild diffuse atrophy    EEG (3/12/20):  Abnormal EEG in the awake state.  - Moderate generalized background slowing.     Assessment:  79 LHF with syncopal event vs. transient ischemic attack      Recommendations:    - Continue aspirin 81mg daily and atorvastatin 40mg QHS for secondary stroke prevention (given chronic pontine infarct)    - Plentiful fluid hydration    - Caution with position changes    - PT/OT    - Follow up as needed in Neurology clinic      Please contact the Neurology consult service with any questions.    Frank Mcknight MD   of Neurology  [facility]

## 2020-03-12 NOTE — PROGRESS NOTE ADULT - PROBLEM SELECTOR PLAN 4
Patient has history of HLP.  on atorvastatin 40 mg once daily.  will continue   lipid panel- decreased HDL
Patient has history of HLP.  on atorvastatin 40 mg once daily.  will continue   lipid panel- decreased HDL

## 2020-03-12 NOTE — PHYSICAL THERAPY INITIAL EVALUATION ADULT - PERTINENT HX OF CURRENT PROBLEM, REHAB EVAL
Patient admitted from home due to slurred speech and AMS. Patient w/ h/o Afib, Dementia, DM, HLD, HTN. and episode of fall.

## 2020-03-12 NOTE — DISCHARGE NOTE PROVIDER - CARE PROVIDER_API CALL
Frank Mcknight)  Neurology  Epilepsy  [address], [address]  [place] [address]  Phone: [phone]  Follow Up Time:

## 2020-03-13 NOTE — PROGRESS NOTE ADULT - SUBJECTIVE AND OBJECTIVE BOX
CHIEF COMPLAINT:Patient is a 79y old  Female who presents with a chief complaint of altered mental status and slurred speech.Pt appears comfortable.    	  REVIEW OF SYSTEMS:  CONSTITUTIONAL: No fever, weight loss, or fatigue  EYES: No eye pain, visual disturbances, or discharge  ENT:  No difficulty hearing, tinnitus, vertigo; No sinus or throat pain  NECK: No pain or stiffness  RESPIRATORY: No cough, wheezing, chills or hemoptysis; No Shortness of Breath  CARDIOVASCULAR: No chest pain, palpitations, passing out, dizziness, or leg swelling  GASTROINTESTINAL: No abdominal or epigastric pain. No nausea, vomiting, or hematemesis; No diarrhea or constipation. No melena or hematochezia.  GENITOURINARY: No dysuria, frequency, hematuria, or incontinence  NEUROLOGICAL: No headaches, memory loss, loss of strength, numbness, or tremors  SKIN: No itching, burning, rashes, or lesions   LYMPH Nodes: No enlarged glands  ENDOCRINE: No heat or cold intolerance; No hair loss  MUSCULOSKELETAL: No joint pain or swelling; No muscle, back, or extremity pain  PSYCHIATRIC: No depression, anxiety, mood swings, or difficulty sleeping  HEME/LYMPH: No easy bruising, or bleeding gums  ALLERGY AND IMMUNOLOGIC: No hives or eczema	        PHYSICAL EXAM:  T(C): 36.2 (03-13-20 @ 05:21), Max: 36.6 (03-12-20 @ 13:51)  HR: 66 (03-13-20 @ 05:21) (57 - 74)  BP: 167/77 (03-13-20 @ 05:21) (90/59 - 167/77)  RR: 18 (03-13-20 @ 05:21) (16 - 18)  SpO2: 95% (03-13-20 @ 05:21) (95% - 96%)  Wt(kg): --  I&O's Summary    12 Mar 2020 07:01  -  13 Mar 2020 07:00  --------------------------------------------------------  IN: 0 mL / OUT: 150 mL / NET: -150 mL        Appearance: Normal	  HEENT:   Normal oral mucosa, PERRL, EOMI	  Lymphatic: No lymphadenopathy  Cardiovascular: Normal S1 S2, No JVD, No murmurs, No edema  Respiratory: Lungs clear to auscultation	  Psychiatry: A & O x 3, Mood & affect appropriate  Gastrointestinal:  Soft, Non-tender, + BS	  Skin: No rashes, No ecchymoses, No cyanosis	  Neurologic: Non-focal  Extremities: Normal range of motion, No clubbing, cyanosis or edema  Vascular: Peripheral pulses palpable 2+ bilaterally    MEDICATIONS  (STANDING):  aspirin  chewable 81 milliGRAM(s) Oral daily  atorvastatin 40 milliGRAM(s) Oral at bedtime  enoxaparin Injectable 40 milliGRAM(s) SubCutaneous daily  insulin glargine Injectable (LANTUS) 15 Unit(s) SubCutaneous at bedtime  insulin lispro (HumaLOG) corrective regimen sliding scale   SubCutaneous Before meals and at bedtime  losartan 25 milliGRAM(s) Oral two times a day  metoprolol tartrate 25 milliGRAM(s) Oral two times a day      	  LABS:	 	                       15.0   10.50 )-----------( 250      ( 12 Mar 2020 07:29 )             45.4     03-12    140  |  104  |  15  ----------------------------<  217<H>  3.6   |  32<H>  |  0.79    Ca    9.2      12 Mar 2020 07:29  Phos  3.4     03-12  Mg     2.1     03-12      proBNP: Serum Pro-Brain Natriuretic Peptide: 324 pg/mL (03-10 @ 20:35)    Lipid Profile: Cholesterol 154  LDL 82  HDL 42      HgA1c: Hemoglobin A1C, Whole Blood: 9.7 % (03-11 @ 09:39)    TSH: Thyroid Stimulating Hormone, Serum: 2.01 uU/mL (03-11 @ 07:28)

## 2020-03-13 NOTE — PROGRESS NOTE ADULT - REASON FOR ADMISSION
altered mental status and slurred speech

## 2020-03-13 NOTE — DISCHARGE NOTE NURSING/CASE MANAGEMENT/SOCIAL WORK - PATIENT PORTAL LINK FT
You can access the FollowMyHealth Patient Portal offered by [facility] by registering at the following website: [email or web address]. By joining [employer]’s FollowMyHealth portal, you will also be able to view your health information using other applications (apps) compatible with our system.

## 2020-03-13 NOTE — PROGRESS NOTE ADULT - ASSESSMENT
79 years old Female patient from home with a PMHx of Afib ( not on any AC because of multiple falls), dementia, DM, HLD, HTN, BIB family to the ED for episode of AMS and slurred speech for 30mins today. Family reports patient went to sleep normally around 4pm and when she woke up, she had trouble making words and seemed to be very confused. States patient was back to normal when the ambulance arrived at the site. Patient has history of afib and is not on blood thinners because she is a fall risk. Daughters report patient fell, went to rehab facility and was just discharged home 2 weeks ago. Family noticed b/l leg swelling for the last few days. Denies fever, vomiting, diarrhea, cough or any other symptoms. Allergies: penicillin  Patient was recently admited to [facility] for syncope and had negative cardiac workup and negative EEG for seizure.  Patient is being admitted to telemetry for suspected TIA. NIHSS on presentation was 0. Patient passed bed side dysphagia screen. Started on aspirin and statin.
79 years old female from home, lives with her , with PMHx of HTN, DM, hyperlipidemia presenting slurred speech and altered mental status.  1.DM-Insulin.  2.HTN-lopressor and cozaar.  3.Lipid d/o-statin.  4.GI and DVT prophylaxis.
79 years old female from home, lives with her , with PMHx of HTN, DM, hyperlipidemia presenting slurred speech and altered mental status.  1.Neurology eval noted.  2.DM-Insulin.  3.HTN-resume lopressor and cozaar.  4.Lipid d/o-statin.  5.GI and DVT prophylaxis.
79 years old Female patient from home with a PMHx of Afib ( not on any AC because of multiple falls), dementia, DM, HLD, HTN, BIB family to the ED for episode of AMS and slurred speech for 30mins today. Family reports patient went to sleep normally around 4pm and when she woke up, she had trouble making words and seemed to be very confused. States patient was back to normal when the ambulance arrived at the site. Patient has history of afib and is not on blood thinners because she is a fall risk. Daughters report patient fell, went to rehab facility and was just discharged home 2 weeks ago. Family noticed b/l leg swelling for the last few days. Denies fever, vomiting, diarrhea, cough or any other symptoms. Allergies: penicillin  [facility] cardiac workup and negative EEG for seizure.  Patient is being admitted to telemetry for suspected TIA. NIHSS on presentation was 0. Patient passed bed side dysphagia screen. Started on aspirin and statin.

## 2020-03-13 NOTE — DISCHARGE NOTE NURSING/CASE MANAGEMENT/SOCIAL WORK - NSDCPEPTSTRK_GEN_ALL_CORE
Call 911 for stroke/Need for follow up after discharge/Stroke education booklet/Stroke warning signs and symptoms/Signs and symptoms of stroke/Stroke support groups for patients, families, and friends/Prescribed medications/Risk factors for stroke

## 2020-03-13 NOTE — PROVIDER CONTACT NOTE (MEDICATION) - BACKGROUND
Patient has been educated on the importance of taking blood pressure medication. Patient states she wants to be left alone to die. Patient safety maintained. Will continue to monitor.

## 2020-03-17 PROBLEM — E78.5 HYPERLIPIDEMIA, UNSPECIFIED: Chronic | Status: ACTIVE | Noted: 2020-01-01

## 2020-03-17 PROBLEM — E11.9 TYPE 2 DIABETES MELLITUS WITHOUT COMPLICATIONS: Chronic | Status: ACTIVE | Noted: 2020-01-01

## 2020-03-17 PROBLEM — I10 ESSENTIAL (PRIMARY) HYPERTENSION: Chronic | Status: ACTIVE | Noted: 2020-01-01

## 2020-03-17 PROBLEM — I48.91 UNSPECIFIED ATRIAL FIBRILLATION: Chronic | Status: ACTIVE | Noted: 2020-01-01

## 2020-03-17 PROBLEM — F03.90 UNSPECIFIED DEMENTIA WITHOUT BEHAVIORAL DISTURBANCE: Chronic | Status: ACTIVE | Noted: 2020-01-01

## 2020-05-12 PROBLEM — Z82.49 FAMILY HISTORY OF MYOCARDIAL INFARCTION: Status: ACTIVE | Noted: 2020-01-01

## 2020-05-12 PROBLEM — Z82.0 FAMILY HISTORY OF ALZHEIMER'S DISEASE: Status: ACTIVE | Noted: 2020-01-01

## 2020-05-12 PROBLEM — Z83.3 FAMILY HISTORY OF TYPE 2 DIABETES MELLITUS: Status: ACTIVE | Noted: 2020-01-01

## 2020-05-12 PROBLEM — R60.9 PERIPHERAL EDEMA: Status: ACTIVE | Noted: 2020-01-01

## 2020-05-12 PROBLEM — Z82.3 FAMILY HISTORY OF CEREBROVASCULAR ACCIDENT (CVA): Status: ACTIVE | Noted: 2020-01-01

## 2020-05-12 NOTE — REASON FOR VISIT
[Post Hospitalization] : a post hospitalization visit [Other: _____] : [unfilled] [FreeTextEntry1] : Transient ischemic attack, Memory difficulty

## 2020-05-12 NOTE — PHYSICAL EXAM
[General Appearance - In No Acute Distress] : in no acute distress [General Appearance - Alert] : alert [Oriented to Person] : oriented to person [Oriented to Place] : oriented to place [Person] : oriented to person [Place] : oriented to place [Naming Objects] : no difficulty naming common objects [Repeating Phrases] : no difficulty repeating a phrase [Fluency] : fluency intact [Comprehension] : comprehension intact [Cranial Nerves Optic (II)] : visual acuity intact bilaterally,  visual fields full to confrontation, pupils equal round and reactive to light [Cranial Nerves Oculomotor (III)] : extraocular motion intact [Cranial Nerves Trigeminal (V)] : facial sensation intact symmetrically [Cranial Nerves Vestibulocochlear (VIII)] : hearing was intact bilaterally [Cranial Nerves Facial (VII)] : face symmetrical [Cranial Nerves Glossopharyngeal (IX)] : tongue and palate midline [Cranial Nerves Accessory (XI - Cranial And Spinal)] : head turning and shoulder shrug symmetric [Cranial Nerves Hypoglossal (XII)] : there was no tongue deviation with protrusion [Motor Tone] : muscle tone was normal in all four extremities [Motor Strength] : muscle strength was normal in all four extremities [Motor Handedness Left-Handed] : the patient is left hand dominant [No Muscle Atrophy] : normal bulk in all four extremities [Sensation Tactile Decrease] : light touch was intact [Sensation Pain / Temperature Decrease] : pain and temperature was intact [Sclera] : the sclera and conjunctiva were normal [Extraocular Movements] : extraocular movements were intact [Outer Ear] : the ears and nose were normal in appearance [Hearing Threshold Finger Rub Not Glynn] : hearing was normal [Neck Appearance] : the appearance of the neck was normal [Skin Color & Pigmentation] : normal skin color and pigmentation [] : no rash [Oriented to Time] : disoriented to time [Time] : disoriented to time [Paresis Pronator Drift Left-Sided] : no pronator drift on the left [Paresis Pronator Drift Right-Sided] : no pronator drift on the right [Motor Strength Lower Extremities Bilaterally] : strength was normal in both lower extremities [Motor Strength Upper Extremities Bilaterally] : strength was normal in both upper extremities [Past-pointing] : there was no past-pointing [Coordination - Dysmetria Impaired Finger-to-Nose Bilateral] : not present [Coordination - Dysmetria Impaired Heel-to-Shin Bilateral] : not present [FreeTextEntry6] : M [FreeTextEntry4] : Immediate recall: 3/3. 5-minute delayed recall: 1/3 (recalls the other two words with MCQ cues). [FreeTextEntry8] : Wide-based stance, Unable to initiate gait without walker, Unable to be tested for Romberg sign. [FreeTextEntry1] : Missing digits at right hand

## 2020-05-12 NOTE — HISTORY OF PRESENT ILLNESS
[FreeTextEntry1] : This appointment is conducted via real-time two-way audiovisual technology due to the current COVID-19 pandemic. Verbal consent for this encounter was received by the patient. The patient was located at her daughter's home in [place], and I was located in [place], [place]. This is a 79-year-old left-handed woman with much of her history provided by her daughter. The daughter states that the patient's memory has been declining for at least the past 1 year. She also recently had an episode of transient confusion which was diagnosed as a transient ischemic attack, for which she was hospitalized at [facility] during March 10-13, 2020. She had another similar episode at the end of March, which prompted a call to 911, but her symptoms resolved in the presence of the EMTs (BP at that time spiked to 202/81, then decreased to 140/90), so a decision was made with the agreement of the daughter to not have her present to the ED. To better control her hypertension, amlodipine has been added to her regimen on 4/17/20, and her BP since then has ranged 126-161 / 58-71 since that time (this morning's BP was 126/65). The patient's blood glucose level has also been well controlled.

## 2020-05-12 NOTE — ASSESSMENT
[FreeTextEntry1] : 79 LHF with concern for recurrent transient ischemic attacks, pontine stroke, and mild cognitive impairment vs. vascular dementia, in setting of atrial fibrillation, diabetes mellitus type 2, hypercholesterolemia, and hypertension.

## 2020-06-09 PROBLEM — Z78.9 CURRENT NON-SMOKER: Status: ACTIVE | Noted: 2020-01-01

## 2020-06-09 PROBLEM — Z78.9 DOES NOT USE ILLICIT DRUGS: Status: ACTIVE | Noted: 2020-01-01

## 2020-06-09 PROBLEM — Z78.9 CURRENT NON-DRINKER OF ALCOHOL: Status: ACTIVE | Noted: 2020-01-01

## 2020-06-09 NOTE — ASSESSMENT
[FreeTextEntry1] : 79 LHF with recurrent transient ischemic attacks, pontine stroke, and mild cognitive impairment vs. vascular dementia characterized by poor delayed recall and phonemic fluency, in setting of atrial fibrillation (not on anticoagulation), diabetes mellitus type 2, hypercholesterolemia, and hypertension.

## 2020-06-09 NOTE — PHYSICAL EXAM
[General Appearance - Alert] : alert [General Appearance - In No Acute Distress] : in no acute distress [Oriented to Person] : oriented to person [Oriented to Place] : oriented to place [Person] : oriented to person [Time] : oriented to time [Fluency] : fluency intact [Comprehension] : comprehension intact [Cranial Nerves Optic (II)] : visual acuity intact bilaterally,  visual fields full to confrontation, pupils equal round and reactive to light [Cranial Nerves Oculomotor (III)] : extraocular motion intact [Cranial Nerves Trigeminal (V)] : facial sensation intact symmetrically [Cranial Nerves Facial (VII)] : face symmetrical [Cranial Nerves Vestibulocochlear (VIII)] : hearing was intact bilaterally [Cranial Nerves Glossopharyngeal (IX)] : tongue and palate midline [Cranial Nerves Accessory (XI - Cranial And Spinal)] : head turning and shoulder shrug symmetric [Cranial Nerves Hypoglossal (XII)] : there was no tongue deviation with protrusion [Motor Tone] : muscle tone was normal in all four extremities [Motor Strength] : muscle strength was normal in all four extremities [No Muscle Atrophy] : normal bulk in all four extremities [Motor Handedness Left-Handed] : the patient is left hand dominant [Sensation Tactile Decrease] : light touch was intact [Sensation Pain / Temperature Decrease] : pain and temperature was intact [Sclera] : the sclera and conjunctiva were normal [Extraocular Movements] : extraocular movements were intact [Outer Ear] : the ears and nose were normal in appearance [Neck Appearance] : the appearance of the neck was normal [Skin Color & Pigmentation] : normal skin color and pigmentation [] : no rash [Oropharynx] : the oropharynx was normal [Oriented to Time] : disoriented to time [Naming Objects] : difficulty naming common objects [Place] : disoriented to place [Paresis Pronator Drift Right-Sided] : no pronator drift on the right [Repeating Phrases] : difficulty repeating a phrase [Motor Strength Upper Extremities Bilaterally] : strength was normal in both upper extremities [Paresis Pronator Drift Left-Sided] : no pronator drift on the left [Romberg's Sign] : Romberg's sign was negtive [Motor Strength Lower Extremities Bilaterally] : strength was normal in both lower extremities [Past-pointing] : there was no past-pointing [Coordination - Dysmetria Impaired Heel-to-Shin Bilateral] : not present [Coordination - Dysmetria Impaired Finger-to-Nose Bilateral] : not present [FreeTextEntry4] : Immediate recall: 3/3. 5-minute delayed recall: 1/3 (recalls one other word with category cue, and incorrectly recalls the third word with MCQ cue). [FreeTextEntry8] : Wide-based stance, Unable to initiate gait without walker, Unable to initiate specialized gaits. [FreeTextEntry1] : Missing digits at right hand

## 2020-06-09 NOTE — HISTORY OF PRESENT ILLNESS
[FreeTextEntry1] : FROM 5/12/20:\par This appointment is conducted via real-time two-way audiovisual technology due to the current COVID-19 pandemic. Verbal consent for this encounter was received by the patient. The patient was located at her daughter's home in [place], and I was located in [place], [place]. This is a 79-year-old left-handed woman with much of her history provided by her daughter. The daughter states that the patient's memory has been declining for at least the past 1 year. She also recently had an episode of transient confusion which was diagnosed as a transient ischemic attack, for which she was hospitalized at [facility] during March 10-13, 2020. She had another similar episode at the end of March, which prompted a call to 911, but her symptoms resolved in the presence of the EMTs (BP at that time spiked to 202/81, then decreased to 140/90), so a decision was made with the agreement of the daughter to not have her present to the ED. To better control her hypertension, amlodipine has been added to her regimen on 4/17/20, and her BP since then has ranged 126-161 / 58-71 since that time (this morning's BP was 126/65). The patient's blood glucose level has also been well controlled.\par \par FROM 6/9/20:\par This appointment is conducted via real-time two-way audiovisual technology due to the current COVID-19 pandemic. Verbal consent for this encounter was received by the patient. The patient was located at her home address, and I was located in [place], [place]. The patient states that she has been doing well since the last clinic visit, although the daughter states that her memory has been declining since then. Specifically, the patient had an episode during which she could not remember where she was or who she was for a few minutes. Even this morning, she had to reminded about this planned appointment. Generally, her BP has been well controlled, around 131 / 57 (highest value over the past two weeks was 161/66). Over at least the past week, she has had increased swelling in both of her legs from her knees down despite increasing her furosemide dosage from 20mg twice daily to 40mg QAM & 20mg QPM. Since the last clinic visit, the patient has seen her PCP, Dr. Alicia, who has advised her that warfarin is unsafe due to her fall risk. The patient has not yet seen her cardiologist since our last appointment.

## 2020-06-09 NOTE — PROCEDURE
[FreeTextEntry1] : Modified MOCA:\par \par Repetition of numbers: "2 8 1 5 4" and "2 4 7 (both correct)\par \par Serial 7s: 100 - 93 - 86 - 79 (had difficulty remembering this number) - 72 - 65 (all correct)\par \par Sentence repetition: Makes paraphasic errors with each of two sentences\par 1. I only know that Rishi is the one to help today. --> Leaves out the word "that"\par 2. The cat always hid under the couch when dogs were in the room. --> Says "bed" instead of "couch"\par \par Abstract thinking: (all correct)\par Banana/Orange: Fruit\par Train/Bicycle: Ways to travel\par Watch/Ruler: Things to measure\par \par Patient does not participate in animal naming test or test for words starting with "F" within 1 minute.

## 2020-06-09 NOTE — DATA REVIEWED
[de-identified] : Transcranial & Carotid Doppler (5/29/20):\par - B/l ICA 40% stenosis and heterogenous plaques\par - No intracranial vascular abnormalities\par - Irregular heart rate noted\par \par Carotid Doppler (1/31/20):\par - No hemodynamically significant carotid stenosis b/l\par \par CT Head (3/10/20):\par - No acute intracranial abnormality\par - Possible left mark infarct\par \par Labs (3/12/20):\par - CBC notable for Hct 45.4 <H>\par - BMP notable for HCO3 32 <H> & Glucose 217 <H>\par - Chronic microvascular disease\par - Mild diffuse atrophy with ventricular dilatation\par - Cavus septum pellucidum and vergae

## 2020-07-02 NOTE — PROVIDER CONTACT NOTE (MEDICATION) - RECOMMENDATIONS
Pending Island Pedicle Flap Text: The defect edges were debeveled with a #15 scalpel blade.  Given the location of the defect, shape of the defect and the proximity to free margins an island pedicle advancement flap was deemed most appropriate.  Using a sterile surgical marker, an appropriate advancement flap was drawn incorporating the defect, outlining the appropriate donor tissue and placing the expected incisions within the relaxed skin tension lines where possible.    The area thus outlined was incised deep to adipose tissue with a #15 scalpel blade.  The skin margins were undermined to an appropriate distance in all directions around the primary defect and laterally outward around the island pedicle utilizing iris scissors.  There was minimal undermining beneath the pedicle flap.

## 2020-08-05 NOTE — ASSESSMENT
[FreeTextEntry1] : 79 LHF with recurrent transient ischemic attacks, pontine stroke, and concern for vascular dementia characterized by poor delayed recall, executive function, visuospatial function, fluency, and attention, in the setting of atrial fibrillation (now on anticoagulation), diabetes mellitus type 2, hypercholesterolemia, and hypertension.

## 2020-08-05 NOTE — PHYSICAL EXAM
[General Appearance - Alert] : alert [General Appearance - In No Acute Distress] : in no acute distress [Oriented to Place] : oriented to place [Oriented to Person] : oriented to person [Person] : oriented to person [Time] : oriented to time [Cranial Nerves Optic (II)] : visual acuity intact bilaterally,  visual fields full to confrontation, pupils equal round and reactive to light [Cranial Nerves Oculomotor (III)] : extraocular motion intact [Cranial Nerves Trigeminal (V)] : facial sensation intact symmetrically [Cranial Nerves Facial (VII)] : face symmetrical [Cranial Nerves Glossopharyngeal (IX)] : tongue and palate midline [Cranial Nerves Hypoglossal (XII)] : there was no tongue deviation with protrusion [Cranial Nerves Accessory (XI - Cranial And Spinal)] : head turning and shoulder shrug symmetric [Motor Handedness Left-Handed] : the patient is left hand dominant [Sensation Tactile Decrease] : light touch was intact [Sensation Pain / Temperature Decrease] : pain and temperature was intact [Extraocular Movements] : extraocular movements were intact [Sclera] : the sclera and conjunctiva were normal [Outer Ear] : the ears and nose were normal in appearance [Oropharynx] : the oropharynx was normal [Neck Appearance] : the appearance of the neck was normal [Skin Color & Pigmentation] : normal skin color and pigmentation [] : no rash [Visual Intact] : visual attention was ~T not ~L decreased [Naming Objects] : no difficulty naming common objects [___ / 30] : the patient achieved a total score of [unfilled] /30 [___ / 5] : Visuospatial / Executive: [unfilled] / 5 [0 / 0] : Memory: 0 / 0 [___ / 3] : Attention (Serial 7 subtraction): [unfilled] / 3 [___ / 1] : Fluency: [unfilled] / 1 [___ / 2] : Abstraction: [unfilled] / 2 [___ / 5] : Delayed Recall: [unfilled] / 5 [___ / 6] : Orientation: [unfilled] / 6 [4] : triceps 4/5 [Hand Weakness Left] : the hand  was weak [3] : ankle plantar flexion 3/5 [1+] : Ankle jerk left 1+ [Heart Sounds] : normal S1 and S2 [Arterial Pulses Carotid] : carotid pulses were normal with no bruits [Full Pulse] : the pedal pulses are present [Pitting Edema] : pitting edema present [___ +] : bilateral [unfilled]+ pitting edema to the ankles [Abdomen Tenderness] : non-tender [Abdomen Soft] : soft [No Spinal Tenderness] : no spinal tenderness [Nail Clubbing] : no clubbing  or cyanosis of the fingernails [Normal] : the heart rate was normal [Irregularly Irregular] : the rhythm was irregularly irregular [Oriented to Time] : disoriented to time [Place] : disoriented to place [Repeating Phrases] : difficulty repeating a phrase [Fluency] : fluency not intact [Comprehension] : comprehension not intact [Paresis Pronator Drift Right-Sided] : no pronator drift on the right [Paresis Pronator Drift Left-Sided] : no pronator drift on the left [Hand Weakness Right] : normal hand  [Past-pointing] : there was no past-pointing [Coordination - Dysmetria Impaired Finger-to-Nose Bilateral] : not present [Coordination - Dysmetria Impaired Heel-to-Shin Bilateral] : not present [Plantar Reflex Right Only] : normal on the right [Plantar Reflex Left Only] : normal on the left [___] : absent on the right [___] : absent on the left [FreeTextEntry6] : Missing digits at left hand [FreeTextEntry8] : Wide-based stance, requiring walker to stand. Unable to assess Romberg sign. Unable to initiate gait without walker, Unable to initiate specialized gaits. [FreeTextEntry1] : Missing digits at right hand

## 2020-08-05 NOTE — DATA REVIEWED
[de-identified] : Transcranial & Carotid Doppler (5/29/20):\par - B/l ICA 40% stenosis and heterogenous plaques\par - No intracranial vascular abnormalities\par - Irregular heart rate noted\par \par Carotid Doppler (1/31/20):\par - No hemodynamically significant carotid stenosis b/l\par \par CT Head (3/10/20):\par - No acute intracranial abnormality\par - Possible left mark infarct\par \par Labs (3/12/20):\par - CBC notable for Hct 45.4 <H>\par - BMP notable for HCO3 32 <H> & Glucose 217 <H>\par - Chronic microvascular disease\par - Mild diffuse atrophy with ventricular dilatation\par - Cavus septum pellucidum and vergae

## 2020-08-05 NOTE — HISTORY OF PRESENT ILLNESS
[FreeTextEntry1] : FROM 5/12/20:\par This appointment is conducted via real-time two-way audiovisual technology due to the current COVID-19 pandemic. Verbal consent for this encounter was received by the patient. The patient was located at her daughter's home in [place], and I was located in [place], [place]. This is a 79-year-old left-handed woman with much of her history provided by her daughter. The daughter states that the patient's memory has been declining for at least the past 1 year. She also recently had an episode of transient confusion which was diagnosed as a transient ischemic attack, for which she was hospitalized at [facility] during March 10-13, 2020. She had another similar episode at the end of March, which prompted a call to 911, but her symptoms resolved in the presence of the EMTs (BP at that time spiked to 202/81, then decreased to 140/90), so a decision was made with the agreement of the daughter to not have her present to the ED. To better control her hypertension, amlodipine has been added to her regimen on 4/17/20, and her BP since then has ranged 126-161 / 58-71 since that time (this morning's BP was 126/65). The patient's blood glucose level has also been well controlled.\par \par FROM 6/9/20:\par This appointment is conducted via real-time two-way audiovisual technology due to the current COVID-19 pandemic. Verbal consent for this encounter was received by the patient. The patient was located at her home address, and I was located in [place], [place]. The patient states that she has been doing well since the last clinic visit, although the daughter states that her memory has been declining since then. Specifically, the patient had an episode during which she could not remember where she was or who she was for a few minutes. Even this morning, she had to reminded about this planned appointment. Generally, her BP has been well controlled, around 131 / 57 (highest value over the past two weeks was 161/66). Over at least the past week, she has had increased swelling in both of her legs from her knees down despite increasing her furosemide dosage from 20mg twice daily to 40mg QAM & 20mg QPM. Since the last clinic visit, the patient has seen her PCP, Dr. Alicia, who has advised her that warfarin is unsafe due to her fall risk. The patient has not yet seen her cardiologist since our last appointment.\par \par FROM 7/31/20:\par The patient states that she has not noticed a changes in her memory, nor any new stroke-like symptoms, since our appointment on 6/9/20. The patient's daughter states that two weeks ago, the patient needed an emergency tooth extraction due to an infection with associated swelling and pain. She had persistent bleeding at the site, requiring placement of stitches in a follow-up visit. She has completed her amoxicillin course earlier this week. Since the dental procedure, she has appeared to be more "spacey" and disoriented. For example, she could not remember which appointment she had today, and with which doctor. The patient had a low BP of 90/60 at home, as measured by her home health aide, and has been unsteady on her feet. Her aide helps for 5 hours per day (9am - 2pm) for 5 days per week, and has previously walked with her, although the patient has not been walking with them over the past week.

## 2020-08-13 NOTE — ED PROVIDER NOTE - PROGRESS NOTE DETAILS
updated patient's daughter on results. no sob, cxr clear. will give dose of IV lasix but will discharge with pcp follow-up as patient is asymptomatic. Bobby Krishna

## 2020-08-13 NOTE — ED PROVIDER NOTE - PHYSICAL EXAMINATION
General: well appearing female, no acute distress   HEENT: normocephalic, atraumatic   Respiratory: normal work of breathing, lungs clear to auscultation bilaterally   Cardiac: regular rate and rhythm, bilateral pitting edema to ankles    Abdomen: soft, non-tender, no guarding or rebound   MSK: no swelling or tenderness of lower extremities, moving all extremities spontaneously   Skin: warm, dry   Neuro: A&Ox3  Psych: appropriate affect

## 2020-08-13 NOTE — ED ADULT NURSE NOTE - OBJECTIVE STATEMENT
pt is here for chest pain.  BIBA, chest pain radiating to the back and hypotension, pain on left arm, denied fever or chills, denied N/V/D, no distress noted at this time.

## 2020-08-13 NOTE — ED PROVIDER NOTE - CLINICAL SUMMARY MEDICAL DECISION MAKING FREE TEXT BOX
79F presenting with chest pain. pain described as pressure, radiating to back. symptoms improved. no sob, nausea or vomiting. concern for ACS, pancreatitis, GERD. plan for labs, cxr, ekg. will reassess.

## 2020-08-13 NOTE — ED PROVIDER NOTE - NSFOLLOWUPINSTRUCTIONS_ED_ALL_ED_FT
You were seen in the emergency department for chest pain.     Please follow-up with your primary care doctor in the next 24-48 hours.     If you have any worsening symptoms, severe chest pain, shortness of breath, nausea or vomiting, please return to the emergency department.

## 2020-08-13 NOTE — ED PROVIDER NOTE - OBJECTIVE STATEMENT
79F, pmh of  Afib (on pradaxa), dementia, DM, HLD, HTN presenting with chest pain. history obtained from patient and daughter. patient reports chest pain that radiated to her back starting around 10am this morning. pain described as pressure. symptoms improved after drinking seltzer and using the bathroom. had similar symptoms previously when admitted to the hospital for TIA workup. daughter reports patient ate a large meal last night and had decreased appetite this morning. no fever, shortness of breath, cough, nausea, vomiting, abdominal pain, pain or burning with urination. reports some foot swelling.

## 2020-08-13 NOTE — ED ADULT NURSE NOTE - NSIMPLEMENTINTERV_GEN_ALL_ED
Implemented All Fall Risk Interventions:  [place] to call system. Call bell, personal items and telephone within reach. Instruct patient to call for assistance. Room bathroom lighting operational. Non-slip footwear when patient is off stretcher. Physically safe environment: no spills, clutter or unnecessary equipment. Stretcher in lowest position, wheels locked, appropriate side rails in place. Provide visual cue, wrist band, yellow gown, etc. Monitor gait and stability. Monitor for mental status changes and reorient to person, place, and time. Review medications for side effects contributing to fall risk. Reinforce activity limits and safety measures with patient and family.

## 2020-08-13 NOTE — ED ADULT NURSE REASSESSMENT NOTE - NS ED NURSE REASSESS COMMENT FT1
observed pt agitated and not following directions. pt tried to walk herself to go to bathroom.  Instructed pt to stay in her bed and we will help her to go to bathroom. pt refused to listen and to follow direction.

## 2020-08-27 NOTE — PHYSICAL THERAPY INITIAL EVALUATION ADULT - FUNCTIONAL LIMITATIONS, PT EVAL
Referred by: Eliza Tarango MD; Medical Diagnosis (from order):    Diagnosis Information      Diagnosis    715.16 (ICD-9-CM) - M17.11 (ICD-10-CM) - Primary osteoarthritis of right knee    719.56 (ICD-9-CM) - M25.661 (ICD-10-CM) - Decreased range of motion (ROM) of right knee    781.2 (ICD-9-CM) - R26.89 (ICD-10-CM) - Impaired gait and mobility                Physical Therapy -  Initial Evaluation    Visit:  1   Treatment Diagnosis: right: knee symptoms with increased pain/symptoms, impaired strength, impaired posture, impaired range of motion, impaired muscle length/flexibility, impaired activity tolerance, impaired body mechanics, impaired joint play/mobility, impaired balance, impaired gait, impaired mobility  Chart reviewed at time of initial evaluation (relevant co-morbidities, allergies, tests and medications listed): Chronic insomnia, right hand pain, numbness and tingling in right hand, essential hypertension benign, diastolic heart failure, morbid obesity, chronic constipation, lumbosacral spondylosis w/o myelopathy, 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, plantar fasciitis, enthesopathy of ankle and tarsus, Osteoarthrosis involving lower leg, thoracic myofascial strain, left tibialis posterior tendonitis, trigger finger, peroneal tendonitis, type 2 diabetes mellitus w/o complication w/o long term current use of insulin, rheumatoid arthritis of multiple sites w/o rheumatoid factor, flat foot,   Current Outpatient Medications:  tiZANidine (ZANAFLEX) 4 MG tablet, TAKE 1 TABLET BY MOUTH EVERY 8 HOURS AS NEEDED FOR CRAMPS, Disp: 270 tablet, Rfl: 0  HYDROcodone-acetaminophen (NORCO)  MG per tablet, Take 1 tablet by mouth every 6 hours as needed for Pain., Disp: , Rfl:   gabapentin (NEURONTIN) 600 MG tablet, TK 1 T PO HS, Disp: , Rfl: 2  irbesartan (AVAPRO) 150 MG tablet, Take 0.5 tablets by mouth daily., Disp: 45 tablet, Rfl: 1  hydrochlorothiazide (HYDRODIURIL) 25 MG  tablet, Take 1 tablet by mouth daily., Disp: 90 tablet, Rfl: 1  lamoTRIgine (LAMICTAL) 150 MG tablet, Take 0.5 tablets by mouth 2 times daily., Disp: 90 tablet, Rfl: 1  albuterol 108 (90 Base) MCG/ACT inhaler, Inhale 2 puffs into the lungs every 4 hours as needed for Shortness of Breath., Disp: 1 Inhaler, Rfl: 0  blood glucose (ONE TOUCH ULTRA TEST) test strip, Test blood sugar 1 times daily as directed. Diagnosis: E11.9. Meter: One Touch Ultra Blue, Disp: 100 strip, Rfl: 3  blood glucose meter, Test blood sugar 1 times daily as directed. Diagnosis: E11.9. Meter: One Touch Ultra Blue, Disp: 1 kit, Rfl: 0  ONETOUCH DELICA LANCETS 33G Mis, USE 1 LANCET TO CHECK GLUCOSE ONCE DAILY, Disp: 100 each, Rfl: 2  hydrOXYzine (ATARAX) 25 MG tablet, Take 1 tablet by mouth at bedtime as needed for Anxiety (insomnia)., Disp: 25 tablet, Rfl: 0  zolpidem (AMBIEN) 10 MG tablet, TAKE 1 TABLET BY MOUTH AT BEDTIME AS NEEDED FOR SLEEP, Disp: 30 tablet, Rfl: 0  SAVELLA 50 MG Tab, TAKE 1 TABLET BY MOUTH ONCE DAILY (BEFORE BREAKFAST) AND 2 TABLETS EVERY EVENING, Disp: 270 tablet, Rfl: 3  ketorolac (ACULAR LS) 0.4 % Solution, Place 1 drop into both eyes 2 times daily., Disp: 1 Bottle, Rfl: 11  polyethylene glycol (MIRALAX) powder, Mix 17 grams in 8 oz of fluid and drink twice daily as needed, Disp: 1020 g, Rfl: 1  Methotrexate Sodium (METHOTREXATE, PF,) 50 MG/2ML injectable solution, weekly, Disp: , Rfl:   folic acid (FOLATE) 1 MG tablet, daily. , Disp: , Rfl:     No current facility-administered medications for this visit.     Past Surgical History:  1968: Appendectomy  2004: Breast reduction surgery      Comment:  breast reduction  2003: Colonoscopy diagnostic      Comment:  Colonoscopy, neg per pt  11/29/2010: Colonoscopy diagnostic      Comment:  Next exam due in 3 years - Dr Hawley  10/08/2014: Colonoscopy diagnostic      Comment:  Dr Thompson recall due 10/2024  No date: Each add tooth extraction      Comment:  wisdom x4  11/29/2010:  Esophagogastroduodenoscopy transoral flex w/bx single or   mult      Comment:  Gastritis - Dr Hawley  2/20/13: Exc tumor soft tisue back flank subcut >3cm      Comment:  [place].  Lipoma  1986: Gastric bypass,obese<150cm silva-en-y  4/17/12: Inject rx other periph nerve      Comment:  Left SIJ RFA 1 week with MAC  5/21/12: Inject rx other periph nerve      Comment:  OV 1 MONTH  08/24/2017: Laminec/facetect/foramin,lumbar      Comment:  Lumbar laminectomy L3-4 and L4-5 with partial medial                facetectomy and foraminotomy of the exiting                nerve(s)./[facility] Dr Zavala/ 8/24/17 4-12-11: Paravertebral facet inj jnt lumbar sacral 1      Comment:  2nd 1 week with sedation  5-3-11: Paravertebral facet inj jnt lumbar sacral 1      Comment:  OV 1 week  8/21/12: Paravertebral facet inj jnt lumbar sacral 1      Comment:  2ND RIGHT LMBB 1 WEEK  9/5/12: Paravertebral facet inj jnt lumbar sacral 1      Comment:  LEFT LRFA 1 WEEK  1969: Removal gallbladder      Comment:  Cholecystectomy  1997: Repair incisional hernia,reducible      Comment:  x3, 2000 2004: Right heart catheterization      Comment:  Normal heart cath - [facility]  2004: Skin graft      Comment:  skin grafts due to post op infection from breast                reduction  2001: Total abdom hysterectomy      Comment:  FAHAD w BSO    05/20/2015: Trigger finger release      Comment:  Right ring trigger finger release  1/23/13: Us guided brst lesion asp/bx/wire loc      Comment:  Left Breast 9 o'clock  breast biopsy/ clip placement    SUBJECTIVE                                                                                                             Patient reports having R knee replacement surgery on 7/21/20.  Since that date has been completing home health PT.  Last measurements were -2 deg of extension and 97 degrees of flexion.  [place] that home health has really helped.  Had the same PT for when she had back surgery.         Pain / Symptoms:  Pain rating  (out of 10): Current: 9 ; Best: 8; Worst: 10  Location: R knee   Quality / Description: sharp, shooting, ache.  Alleviating Factors:. Moving/walking    Progression since onset: improved  Function:   Limitations / Exacerbation Factors: pain, increased time, Standing up from a chair, walking longer than a block and a half, anytime the knee has to bend/extend, has not tried stairs yet.  Prior Level of Function: declining function, therefore referred to therapy,  Patient Goals: decreased pain, increased motion, increased strength and independence with ADLs/IADLs    Prior treatment: injections Home physical therapy  Discharged from hospital, home health, or skilled nursing facility in last 30 days: yes  Home Environment:   Patient lives with alone  Type of home: apartment (3 level)  Assistance available: as needed  Feels safe at home/work/school.  2 or more falls or an unexplained fall with injury in the last year:  No    OBJECTIVE                                                                                                                       Observed Gait:     Assistive Device: standard walker  Incision/Wound:    Location: No signs of infection or red flags on surgical incision site of R knee.      Range of Motion (ROM)   (norms in parentheses, degrees unless noted, active unless noted):   Hip:      - Flexion (100-120):        • Left: 90        • Right: 55 pain    - Internal Rotation in supine (45-50):        • Left: WNL        • Right: WNL    - External Rotation in supine (45-50):        • Left: WNL        • Right: WNL  Knee:    - Flexion (150):        • Left: 110        • Right: 95 pain    - Extension (0-10; negative=lacking to 0, positive=beyond 0):        • Left: -2        • Right: -2    Strength  (out of 5 unless noted, standard test position unless noted, lbs tested with hand held dynamometer)   Knee:    - Extension:        • Left: 4+        • Right: 4+            Double Leg Squat: , Left: pain and increased  lumbar lordosis, Right: pain and increased lumbar lordosis    Comments:  Displayed minimal confidence in bending knees with motion    Standing Balance:   Comments / Details: Single leg balance: unable to complete for 10 sec bilaterally      KOOS JR: Raw Score: 12 (0-28), Interval Score 57.140 (0-100)  interval score: 0=total knee disability to 100=perfect knee health    TREATMENT                                                                                                                initial evaluation completed  Therapeutic Exercise:  Knee AAROM w/slideboard and belt  -1x10    Discussed current HEP and that patient should continue this for now, next session will look to progress.      Skilled input: verbal instruction/cues    Writer verbally educated and received verbal consent for hand placement, positioning of patient, and techniques to be performed today from patient for clothing adjustments for techniques, hand placement and palpation for techniques, therapist position for techniques and modality application as described above and how they are pertinent to the patient's plan of care.      ASSESSMENT                                                                                                             70 year old female patient has reported functional limitations listed above impacted by signs and symptoms consistent with right knee, increased pain/symptoms, impaired strength, impaired posture, impaired range of motion, impaired muscle length/flexibility, impaired activity tolerance, impaired body mechanics, impaired joint play/mobility, impaired balance, impaired gait and impaired mobility.    Prognosis: patient will benefit from skilled therapy  Rehabilitative Potential: good  Predicted patient presentation: Moderate (evolving) - Patient comorbidities and complexities, as defined above, may have varying impact on steady progress for prescribed plan of care.    Patient Education:   Who will be  receiving education: patient  Are they ready to learn: yes  Preferred learning style: written, verbal and demonstration  Barriers to learning: no barriers apparent at this time  Results of above outlined education: Verbalizes understanding and Demonstrates understanding     PLAN                                                                                                                           The following skilled interventions to be implemented to achieve goals listed below:  Activities of Daily Living/Self Care (48242)  Dry Needling - Unlisted physical medicine/rehabilitation service or procedure (04629/37275.02)  Gait Training (98525)  Manual Therapy (54785)  Neuromuscular Re-Education (26245)  Therapeutic Activity (52044)  Therapeutic Exercise (14637)  Electrical Stimulation (74628//22949)   Fluidotherapy/Whirlpool (75849/90538)  Heat/Cold (05981)  Mechanical Traction (64272)  Ultrasound/Phonophoresis (25283)  Aquatic Therapy (27298)      Frequency / Duration: 2 times per week tapering as patient progresses for an estimated total of 18 visits for 12 weeks    patient involved in and agreed to plan of care and goals.  Patient has been given attendance policy at time of initial evaluation.    Suggestions for next session as indicated: Progress per plan of care    Continue Home PT exercises    IASTM to right knee varying degrees of flexion  Knee flexion/extension mobilization  -emphasis on flexion  Hip strengthening progression  Knee strengthening progression    GOALS                                                                                                                       Long Term Goals: To be met by end of plan of care:      Home Exercise Program: Independent with progressed and modified home exercise program (HEP)      Range of Motion: Improve involved range of motion (ROM) to   0-120 degrees with R knee ROM    Stairs: Ascend and descend reciprocal, with reported manageable/tolerable  difficulty and with least restrictive device (Walking and moving (mobility))    Patient Reported Outcome Measure: Improvement in function /disability/impairment as indicated by KOOS jr (minimal clinically important difference 15.1 in raw score) > or =   72       Procedures and total treatment time documented Time Entry flowsheet.     self-care

## 2020-10-03 NOTE — ED PROVIDER NOTE - NSFOLLOWUPCLINICS_GEN_ALL_ED_FT
[facility]  Hematology/Oncology  [address] [id number]  Phone: [phone]  Fax:   Follow Up Time: 4-6 Days

## 2020-10-03 NOTE — ED PROVIDER NOTE - CARE PLAN
Principal Discharge DX:	Renal mass, right  Secondary Diagnosis:	Non-intractable vomiting with nausea

## 2020-10-03 NOTE — ED PROVIDER NOTE - HIV OFFER
Addressed in 05/24/2019 TE.     Ibeth Saba, RN  Triage Nurse     Previously Declined (within the last year)

## 2020-10-03 NOTE — ED PROVIDER NOTE - OBJECTIVE STATEMENT
79 y/o woman, h/o paroxysmal A-fib, vascular dementia, DM, HTN, BIB relatives from home for right sided diffuse abdominal pain and vomiting since last night, with severely decreased appetite x 3 days.  No fever/dysuria.  Has chronic back pain and chronic cough unchanged.  Had normal BM in past day.  Previously was taking Coumadin but her doctor stopped and changed to NOAC (? Xarelto) but ran out and due to insurance issue, stopped since about 2 weeks ago.  Denies CP/SOB.  Has not had COVID to her knowledge.

## 2020-10-03 NOTE — ED PROVIDER NOTE - PROGRESS NOTE DETAILS
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  Gave option to be admitted however they strongly wish to be discharged.  Pt tolerating PO here with no vomiting after dose of Zofran and will prescribe Zofran ODT for prn use.  Clearly explained need for f/u with PMD, nephrologist, and oncologist.  Return precautions given. Pt tolerates PO.  She wants to go home and her daughters prefer to take her home and f/u outpatient.

## 2020-10-03 NOTE — ED PROVIDER NOTE - NSFOLLOWUPINSTRUCTIONS_ED_ALL_ED_FT
Renal Mass       A renal mass is a growth in the kidney. A renal mass may be found while performing an MRI, CT scan, or ultrasound for other problems of the abdomen. Certain types of cancers, infections, or injuries can cause a renal mass. A renal mass that is cancerous (malignant) may grow or spread quickly. Others are harmless (benign).      What are common types of renal masses?  Renal masses include:•Tumors. These may be cancerous (malignant) or noncancerous (benign).  •The most common type of kidney cancer is renal cell carcinoma.      •The most common benign tumors of the kidney include renal adenomas, oncocytomas, and angiomyolipoma (AML).      •Cysts. These are fluid-filled sacs that form on or in the kidney.  •It is not always known what causes a cyst to develop in or on the kidney.      •Most kidney cysts do not cause symptoms and do not need to be treated.          What type of testing might I need?  Your health care provider may recommend that you have tests to diagnose the cause of your renal mass. The following tests may be done if a renal mass is found:  •Physical exam.      •Blood tests.      •Urine tests.      •Imaging tests, such as ultrasound, CT scan, or MRI.      •Biopsy. This is a small sample that is removed from the renal mass and tested in a lab.    The exact tests and how often they are done will depend on:  •The size and appearance of the renal mass.      •Risk factors or medical conditions that increase your risk for problems.      •Any symptoms associated with the renal mass, or concerns that you have about it.      Tests and physical exams may be done once, or they may be done regularly for a period of time. Tests and exams that are done regularly will help monitor whether the mass is growing and beginning to cause problems.      What are common treatments for renal masses?    Treatment is not always needed for this condition. Your health care provider may recommend careful monitoring (watchful waiting) and regular tests and exams. Treatment will depend on the cause of the mass.      Follow these instructions at home:  What you need to do at home will depend on the cause of the mass. Follow the instructions that your health care provider gives to you. In general:  •Take over-the-counter and prescription medicines only as told by your health care provider.      •If you are prescribed an antibiotic medicine, take it as told by your health care provider. Do not stop taking the antibiotic even if you start to feel better.      •Follow any restrictions that are given to you by your health care provider.    •Keep all follow-up visits as told by your health care provider. This is important.  •You may need to see your health care provider once or twice a year to have CT scans and ultrasounds done. These tests will show if your renal mass has changed or grown bigger.          Contact a health care provider if you:    •Have pain in the side or back (flank pain).      •Have a fever.      •Feel full soon after eating.      •Have pain or swelling in the abdomen.      •Lose weight.        Get help right away if:    •Your pain gets worse.      •There is blood in your urine.      •You cannot urinate.      •You have chest pain.      •You have trouble breathing.        Summary    •A renal mass is a growth in the kidney. It may be cancerous (malignant) and grow or spread quickly, or it may be harmless (benign).      •Renal masses may be found while performing an MRI, CT scan, or ultrasound for other problems of the abdomen.      •Your health care provider may recommend that you have tests to diagnose the cause of your renal mass. This may include a physical exam, blood tests, urine tests, imaging, or a biopsy.      •Treatment is not always needed for this condition. Careful monitoring (watchful waiting) may be recommended.      This information is not intended to replace advice given to you by your health care provider. Make sure you discuss any questions you have with your health care provider.      Document Released: 07/15/2015 Document Revised: 01/24/2019 Document Reviewed: 01/24/2019    [employer] Patient Education © 2020 [employer]                Acute Nausea and Vomiting    WHAT YOU NEED TO KNOW:    Acute nausea and vomiting start suddenly, worsen quickly, and last a short time.    DISCHARGE INSTRUCTIONS:    Return to the emergency department if:   •You see blood in your vomit or your bowel movements.      •You have sudden, severe pain in your chest and upper abdomen after hard vomiting or retching.      •You have swelling in your neck and chest.       •You are dizzy, cold, and thirsty and your eyes and mouth are dry.      •You are urinating very little or not at all.      •You have muscle weakness, leg cramps, and trouble breathing.       •Your heart is beating much faster than normal.       •You continue to vomit for more than 48 hours.       Contact your healthcare provider if:   •You have frequent dry heaves (vomiting but nothing comes out).      •Your nausea and vomiting does not get better or go away after you use medicine.      •You have questions or concerns about your condition or treatment.      Medicines: You may need any of the following:   •Medicines may be given to calm your stomach and stop your vomiting. You may also need medicines to help you feel more relaxed or to stop nausea and vomiting caused by motion sickness.      •Gastrointestinal stimulants are used to help empty your stomach and bowels. This may help decrease nausea and vomiting.      •Take your medicine as directed. Contact your healthcare provider if you think your medicine is not helping or if you have side effects. Tell him or her if you are allergic to any medicine. Keep a list of the medicines, vitamins, and herbs you take. Include the amounts, and when and why you take them. Bring the list or the pill bottles to follow-up visits. Carry your medicine list with you in case of an emergency.      Prevent or manage acute nausea and vomiting:   •Do not drink alcohol. Alcohol may upset or irritate your stomach. Too much alcohol can also cause acute nausea and vomiting.      •Control stress. Headaches due to stress may cause nausea and vomiting. Find ways to relax and manage your stress. Get more rest and sleep.      •Drink more liquids as directed. Vomiting can lead to dehydration. It is important to drink more liquids to help replace lost body fluids. Ask your healthcare provider how much liquid to drink each day and which liquids are best for you. Your provider may recommend that you drink an oral rehydration solution (ORS). ORS contains water, salts, and sugar that are needed to replace the lost body fluids. Ask what kind of ORS to use, how much to drink, and where to get it.      •Eat smaller meals, more often. Eat small amounts of food every 2 to 3 hours, even if you are not hungry. Food in your stomach may decrease your nausea.      •Talk to your healthcare provider before you take over-the-counter (OTC) medicines. These medicines can cause serious problems if you use certain other medicines, or you have a medical condition. You may have problems if you use too much or use them for longer than the label says. Follow directions on the label carefully.       Follow up with your healthcare provider as directed: Write down your questions so you remember to ask them during your follow-up visits.       © Copyright [email or web address] 2020           back to top                          © Copyright [email or web address] 2020                         Acute Abdominal Pain    WHAT YOU NEED TO KNOW:    The cause of your abdominal pain may not be found. If a cause is found, treatment will depend on what the cause is.     DISCHARGE INSTRUCTIONS:    Return to the emergency department if:   •You vomit blood or cannot stop vomiting.      •You have blood in your bowel movement or it looks like tar.       •You have bleeding from your rectum.       •Your abdomen is larger than usual, more painful, and hard.       •You have severe pain in your abdomen.       •You stop passing gas and having bowel movements.       •You feel weak, dizzy, or faint.      Contact your healthcare provider if:   •You have a fever.      •You have new signs and symptoms.      •Your symptoms do not get better with treatment.       •You have questions or concerns about your condition or care.      Medicines may be given to decrease pain, treat an infection, and manage your symptoms. Take your medicine as directed. Call your healthcare provider if you think your medicine is not helping or if you have side effects. Tell him if you are allergic to any medicine. Keep a list of the medicines, vitamins, and herbs you take. Include the amounts, and when and why you take them. Bring the list or the pill bottles to follow-up visits. Carry your medicine list with you in case of an emergency.    Manage your symptoms:   •Apply heat on your abdomen for 20 to 30 minutes every 2 hours for as many days as directed. Heat helps decrease pain and muscle spasms.       •Manage your stress. Stress may cause abdominal pain. Your healthcare provider may recommend relaxation techniques and deep breathing exercises to help decrease your stress. Your healthcare provider may recommend you talk to someone about your stress or anxiety, such as a counselor or a trusted friend. Get plenty of sleep and exercise regularly.       •Limit or do not drink alcohol. Alcohol can make your abdominal pain worse. Ask your healthcare provider if it is safe for you to drink alcohol. Also ask how much is safe for you to drink.       •Do not smoke. Nicotine and other chemicals in cigarettes can damage your esophagus and stomach. Ask your healthcare provider for information if you currently smoke and need help to quit. E-cigarettes or smokeless tobacco still contain nicotine. Talk to your healthcare provider before you use these products.       Make changes to the food you eat as directed: Do not eat foods that cause abdominal pain or other symptoms. Eat small meals more often.   •Eat more high-fiber foods if you are constipated. High-fiber foods include fruits, vegetables, whole-grain foods, and legumes.       •Do not eat foods that cause gas if you have bloating. Examples include broccoli, cabbage, and cauliflower. Do not drink soda or carbonated drinks, because these may also cause gas.       •Do not eat foods or drinks that contain sorbitol or fructose if you have diarrhea and bloating. Some examples are fruit juices, candy, jelly, and sugar-free gum.       •Do not eat high-fat foods, such as fried foods, cheeseburgers, hot dogs, and desserts.      •Limit or do not drink caffeine. Caffeine may make symptoms, such as heart burn or nausea, worse.       •Drink plenty of liquids to prevent dehydration from diarrhea or vomiting. Ask your healthcare provider how much liquid to drink each day and which liquids are best for you.       Follow up with your healthcare provider as directed: Write down your questions so you remember to ask them during your visits.       © Copyright [email or web address] 2020           back to top                          © Copyright [email or web address] 2020

## 2020-10-03 NOTE — ED PROVIDER NOTE - PATIENT PORTAL LINK FT
You can access the FollowMyHealth Patient Portal offered by [facility] by registering at the following website: [email or web address]. By joining [employer]’s FollowMyHealth portal, you will also be able to view your health information using other applications (apps) compatible with our system.

## 2020-10-03 NOTE — ED PROVIDER NOTE - SPECIALTY CARE
Pre-Visit Chart Review  For Appointment Scheduled on 3-24-17    Health Maintenance Due   Topic Date Due    Colonoscopy  05/31/1995    Zoster Vaccine  05/31/2005                 \     Hematology/Oncology

## 2020-10-03 NOTE — ED PROVIDER NOTE - CLINICAL SUMMARY MEDICAL DECISION MAKING FREE TEXT BOX
79 y/o woman, h/o paroxysmal A-fib, vascular dementia, DM, HTN, BIB relatives from home for right sided diffuse abdominal pain and vomiting since last night, with severely decreased appetite x 3 days--CT A/P, CXR, labs, UA, IVF, nausea mediation, reassess.

## 2020-10-05 PROBLEM — G45.9 TRANSIENT ISCHEMIC ATTACK: Status: ACTIVE | Noted: 2020-01-01

## 2020-10-05 PROBLEM — R79.1 ELEVATED INR: Status: ACTIVE | Noted: 2020-01-01

## 2020-10-12 NOTE — HISTORY OF PRESENT ILLNESS
[FreeTextEntry1] : FROM 5/12/20:\par This appointment is conducted via real-time two-way audiovisual technology due to the current COVID-19 pandemic. Verbal consent for this encounter was received by the patient. The patient was located at her daughter's home in [place], and I was located in [place], [place]. This is a 79-year-old left-handed woman with much of her history provided by her daughter. The daughter states that the patient's memory has been declining for at least the past 1 year. She also recently had an episode of transient confusion which was diagnosed as a transient ischemic attack, for which she was hospitalized at [facility] during March 10-13, 2020. She had another similar episode at the end of March, which prompted a call to 911, but her symptoms resolved in the presence of the EMTs (BP at that time spiked to 202/81, then decreased to 140/90), so a decision was made with the agreement of the daughter to not have her present to the ED. To better control her hypertension, amlodipine has been added to her regimen on 4/17/20, and her BP since then has ranged 126-161 / 58-71 since that time (this morning's BP was 126/65). The patient's blood glucose level has also been well controlled.\par \par FROM 6/9/20:\par This appointment is conducted via real-time two-way audiovisual technology due to the current COVID-19 pandemic. Verbal consent for this encounter was received by the patient. The patient was located at her home address, and I was located in [place], [place]. The patient states that she has been doing well since the last clinic visit, although the daughter states that her memory has been declining since then. Specifically, the patient had an episode during which she could not remember where she was or who she was for a few minutes. Even this morning, she had to reminded about this planned appointment. Generally, her BP has been well controlled, around 131 / 57 (highest value over the past two weeks was 161/66). Over at least the past week, she has had increased swelling in both of her legs from her knees down despite increasing her furosemide dosage from 20mg twice daily to 40mg QAM & 20mg QPM. Since the last clinic visit, the patient has seen her PCP, Dr. Alicia, who has advised her that warfarin is unsafe due to her fall risk. The patient has not yet seen her cardiologist since our last appointment.\par \par FROM 7/31/20:\par The patient states that she has not noticed a changes in her memory, nor any new stroke-like symptoms, since our appointment on 6/9/20. The patient's daughter states that two weeks ago, the patient needed an emergency tooth extraction due to an infection with associated swelling and pain. She had persistent bleeding at the site, requiring placement of stitches in a follow-up visit. She has completed her amoxicillin course earlier this week. Since the dental procedure, she has appeared to be more "spacey" and disoriented. For example, she could not remember which appointment she had today, and with which doctor. The patient had a low BP of 90/60 at home, as measured by her home health aide, and has been unsteady on her feet. Her aide helps for 5 hours per day (9am - 2pm) for 5 days per week, and has previously walked with her, although the patient has not been walking with them over the past week.\par \par FROM 10/5/20:\par The patient's daughter, by telephone, states that over the past one week, the patient has been disoriented to time (typically the year) and to place (does not remember where she is staying). She can correctly identify her name and her daughter's name, but she incorrectly identifies the name of the cat in the home. She seems to have difficulty following logical processes (e.g., does not understand that she should eat when she takes certain medications, even though in the past, this was not a problem). Over the past week, she has also had poor appetite, and her family had brought her to the [facility] ED on 10/3/20 because she had not eaten in three days. During that workup, she was found to have a questionable kidney mass while seen in the [facility] ED, which is being worked up.

## 2020-10-12 NOTE — PHYSICAL EXAM
[General Appearance - Alert] : alert [General Appearance - In No Acute Distress] : in no acute distress [Oriented to Person] : oriented to person [Oriented to Place] : oriented to place [Person] : oriented to person [Time] : oriented to time [Visual Intact] : visual attention was ~T not ~L decreased [Naming Objects] : no difficulty naming common objects [Cranial Nerves Optic (II)] : visual acuity intact bilaterally,  visual fields full to confrontation, pupils equal round and reactive to light [Cranial Nerves Oculomotor (III)] : extraocular motion intact [Cranial Nerves Trigeminal (V)] : facial sensation intact symmetrically [Cranial Nerves Facial (VII)] : face symmetrical [Cranial Nerves Glossopharyngeal (IX)] : tongue and palate midline [Cranial Nerves Accessory (XI - Cranial And Spinal)] : head turning and shoulder shrug symmetric [Cranial Nerves Hypoglossal (XII)] : there was no tongue deviation with protrusion [Motor Handedness Left-Handed] : the patient is left hand dominant [4] : triceps 4/5 [3] : ankle plantar flexion 3/5 [Sensation Tactile Decrease] : light touch was intact [Sensation Pain / Temperature Decrease] : pain and temperature was intact [1+] : Ankle jerk left 1+ [Sclera] : the sclera and conjunctiva were normal [Extraocular Movements] : extraocular movements were intact [Outer Ear] : the ears and nose were normal in appearance [Oropharynx] : the oropharynx was normal [Neck Appearance] : the appearance of the neck was normal [Heart Sounds] : normal S1 and S2 [Normal] : the heart rate was normal [Irregularly Irregular] : the rhythm was irregularly irregular [Arterial Pulses Carotid] : carotid pulses were normal with no bruits [Full Pulse] : the pedal pulses are present [Pitting Edema] : pitting edema present [___ +] : bilateral [unfilled]+ pitting edema to the ankles [Abdomen Soft] : soft [Abdomen Tenderness] : non-tender [No Spinal Tenderness] : no spinal tenderness [Nail Clubbing] : no clubbing  or cyanosis of the fingernails [Skin Color & Pigmentation] : normal skin color and pigmentation [] : no rash [Oriented to Time] : disoriented to time [Place] : disoriented to place [Short Term Intact] : short term memory impaired [Concentration Intact] : a decrease in concentrating ability was observed [Repeating Phrases] : difficulty repeating a phrase [Fluency] : fluency not intact [Comprehension] : comprehension not intact [___ / 30] : the patient achieved a total score of [unfilled] /30 [___ / 5] : Visuospatial / Executive: [unfilled] / 5 [0 / 0] : Memory: 0 / 0 [___ / 3] : Attention (Serial 7 subtraction): [unfilled] / 3 [___ / 1] : Fluency: [unfilled] / 1 [___ / 2] : Abstraction: [unfilled] / 2 [___ / 5] : Delayed Recall: [unfilled] / 5 [___ / 6] : Orientation: [unfilled] / 6 [Paresis Pronator Drift Right-Sided] : no pronator drift on the right [Paresis Pronator Drift Left-Sided] : no pronator drift on the left [Hand Weakness Right] : normal hand  [Hand Weakness Left] : normal hand  [Past-pointing] : there was no past-pointing [Coordination - Dysmetria Impaired Finger-to-Nose Bilateral] : not present [Coordination - Dysmetria Impaired Heel-to-Shin Bilateral] : not present [Plantar Reflex Right Only] : normal on the right [Plantar Reflex Left Only] : normal on the left [___] : absent on the right [___] : absent on the left [FreeTextEntry6] : Missing digits at right hand [FreeTextEntry8] : Wide-based stance and gait, requiring walker to stand and walk. Unable to assess Romberg sign. Unable to evaluate specialized gait testing. [PERRL With Normal Accommodation] : pupils were equal in size, round, reactive to light, with normal accommodation [Auscultation Breath Sounds / Voice Sounds] : lungs were clear to auscultation bilaterally [FreeTextEntry1] : Missing digits at right hand

## 2020-10-12 NOTE — ASSESSMENT
[FreeTextEntry1] : 80 LHF with recurrent transient ischemic attacks, pontine stroke, and concern for vascular dementia characterized by poor delayed recall, executive function, visuospatial function, fluency, and attention, in the setting of atrial fibrillation (on anticoagulation), diabetes mellitus type 2, hypercholesterolemia, and hypertension.

## 2020-10-12 NOTE — DATA REVIEWED
[de-identified] : Transcranial & Carotid Doppler (5/29/20):\par - B/l ICA 40% stenosis and heterogenous plaques\par - No intracranial vascular abnormalities\par - Irregular heart rate noted\par \par Carotid Doppler (1/31/20):\par - No hemodynamically significant carotid stenosis b/l\par \par CT Head (3/10/20):\par - No acute intracranial abnormality\par - Possible left mark infarct\par - Chronic microvascular disease\par - Mild diffuse atrophy with ventricular dilatation\par - Cavus septum pellucidum and vergae\par \par Labs (3/12/20):\par - CBC notable for Hct 45.4 <H>\par - BMP notable for HCO3 32 <H> & Glucose 217 <H>\par \par CT Abdomen/Pelvis w/ IV Contrast (10/3/20):\par - Two heterogenous

## 2020-10-12 NOTE — PROCEDURE
[FreeTextEntry1] : EXTENDED NEUROBEHAVIORAL STATUS TESTING\par \par [This is a separate procedure note for the Neurobehavioral Status Examination performed during this encounter.]\par \par Ms. WAGGONER was awake and alert, well groomed, and in no acute distress. She had mostly fluent speech, with few paraphasic errors. There was no anomia in conversation. Comprehension was decreased. She was not engaged in the discussion, deferring to her daughter to recount her history. She appeared irritable at times and tired at other times, but not anxious or depressed.\par \par Recent memory: Ms. WAGGONER cannot name the president and cannot name a specific thing that she did yesterday.\par \par MoCA (Version 7.1) score out of 30: 10\par \par Memory Index Score (MIS) out of 15: 3\par \par Visuospatial/Executive\par           Trails: (-1) Connects "2-B-C-D-4" and skips 3\par           Rectangle: (-1) 2-dimensional figure\par           Clock: (-3) Drawn in shape of cross, only numbers 1-5 written, no hands drawn\par \par Naming: Intact\par \par Memory (Registration): 2/5 on both trials (incorrectly states "Truck" as "Trust" twice despite being corrected in between trials)\par \par Attention:\par           Digit span 5 Forward: Intact\par           Digit span 3 Reverse: (-1) "2 8 5"\par           Letter A test: (-1) 3 errors\par           Serial 7 subtraction: (-1)  83 --> 76 --> 69 --> 62 --> 58\par \par Language:\par           Phrase repetition: (-1) States "window" instead of "windows" in first sentence\par           Word fluency (F): (-1) 5 words\par \par Abstraction:\par           Train/Bicycle: "Gems"\par           Watch/Ruler: "Type of guns"\par \par Delayed recall score out of 5: (-5) 0 words\par           Additional words with category cue: 0 words (recalls 4 words incorrectly with category cue)\par           Additional words with multiple choice cue: 3 words (recalls 2 words incorrectly with MCQ cue)\par \par Orientation: (-6) Misstates day of week as Wednesday; does not know the other items\par \par \par Additional Neurobehavioral status tests:\par \par Animal naming fluency: 6 words (repeats 2 words; pauses for 28 seconds between the 5th and 6th word)\par \par Praxis:\par \par Ideomotor limb\par Transitive:\par           Brush hair: Intact b/l\par           Cut loaf: Intact b/l\par Intransitive:\par           Wave goodbye: Intact b/l\par           Motion "come here": Intact b/l\par Meaningless gestures: Intact to 4/4\par \par Right/Left Orientation:\par           "Show me your right hand": Correct\par           "Show me your left hand": Correct\par           "With your left hand, point to your left ear": Correct\par           "With your right hand, point to your left shoulder": Correct\par           "With your left hand, point to my left ear": Correct (points to my right ear) \par           " With your right hand, point to my left shoulder": Correct\par \par \par INTERPRETATION:\par \par I have carefully reviewed the above neurobehavioral status testing results. The cognitive domains are listed below, as well as my interpretation of whether or not there is an impairment or if performance was within normal limits. This differs from standard neuropsychological testing, since the raw scores alone on different validated batteries of tests may not detect or may overestimate subtle deficits.\par \par Cognitive domains:\par           Attention: Decreased\par           Working Memory: Decreased\par           Executive Function: Decreased\par           Language: Phonemic and Semantic Fluency Decreased\par           Memory: Decreased\par           Visuospatial Function: Decreased\par           Praxis: Slightly Decreased\par           Behavior/Mood: Normal behavior, Irritable mood\par           Other comments: Patient has history of multiple transient ischemic attacks, and is tired during exam\par \par 60 minutes were spent administering and interpreting the extended neurobehavioral status testing, as well as preparing this report.\par \par Testing start time: 1:30 pm\par Testing end time: 2:00 pm\par Report time: 30 minutes\par \par

## 2020-10-13 PROBLEM — Z80.42 FAMILY HISTORY OF MALIGNANT NEOPLASM OF PROSTATE: Status: ACTIVE | Noted: 2020-01-01

## 2020-10-13 NOTE — REVIEW OF SYSTEMS
[Negative] : Heme/Lymph [FreeTextEntry6] : 2 lesions on right kidney / cyst on left kidney / abdominal pain

## 2020-10-13 NOTE — PHYSICAL EXAM
[General Appearance - Well Developed] : well developed [General Appearance - Well Nourished] : well nourished [Normal Appearance] : normal appearance [Well Groomed] : well groomed [General Appearance - In No Acute Distress] : no acute distress [Edema] : no peripheral edema [Respiration, Rhythm And Depth] : normal respiratory rhythm and effort [Exaggerated Use Of Accessory Muscles For Inspiration] : no accessory muscle use [Abdomen Soft] : soft [Abdomen Tenderness] : non-tender [Costovertebral Angle Tenderness] : no ~M costovertebral angle tenderness [Urinary Bladder Findings] : the bladder was normal on palpation [FreeTextEntry1] : Walks with a walker [] : no rash [No Focal Deficits] : no focal deficits [Oriented To Time, Place, And Person] : oriented to person, place, and time [Affect] : the affect was normal [Mood] : the mood was normal [Not Anxious] : not anxious [No Palpable Adenopathy] : no palpable adenopathy

## 2020-10-13 NOTE — ASSESSMENT
[FreeTextEntry1] : Very pleasant 80-year-old woman who presents for evaluation of 2 right renal masses on recent CT scan\par -CT images reviewed with the patient and her daughter at length\par -Labs from hospital reviewed\par -BMP\par -CT renal mass protocol\par -Follow-up in 2 weeks\par -We discussed the likelihood of malignancy of the 2 renal masses in her kidney given the size, location, and appearance on CT scan.  Patient and her daughter understand the importance of follow-up for this.

## 2020-10-13 NOTE — HISTORY OF PRESENT ILLNESS
[FreeTextEntry1] : Very pleasant 80-year-old woman who presents for evaluation of 2 right renal mass.  Patient recently went to the emergency department and a CT scan was performed.  This demonstrated 2 right renal masses concerning for renal cell carcinoma.  Additionally, it demonstrated a left renal cyst.  She denies flank pain.  No hematuria.  She does report mid back pain.  No specific timing to her symptoms.  No aggravating or alleviating factors that she knows of.  No other complaints.\par \par Patient reports that her mother  of renal failure in her 20s. [None] : no symptoms

## 2020-10-28 NOTE — ASSESSMENT
[FreeTextEntry1] : Very pleasant 80-year-old woman who presents for follow-up of 2 right renal masses on CT scan highly concerning for cancer\par -We discussed the high likelihood of malignancy given appearance of renal masses on CT scan\par -CT images from [facility] radiology reviewed\par -Prior labs reviewed\par -We had an extensive discussion regarding potential management options for 2 right renal masses, including observation, biopsy with ablation, surgery and after a thorough discussion of the risks and benefits of each she is very interested in surgery\par -We had an extensive discussion regarding the risks and benefits of surgery, including that of both a partial and radical nephrectomy\par -I have recommended that she see one of my partners for evaluation\par -We discussed staging for renal masses should this be renal cell carcinoma or another malignancy

## 2020-10-28 NOTE — HISTORY OF PRESENT ILLNESS
[FreeTextEntry1] : Very pleasant 80-year-old woman presents for follow-up of 2 right renal masses.  She recently underwent renal mass protocol CT scan which demonstrated 2 right renal masses, 1 in the upper pole measuring 2.2 x 2.4 x 1.8 cm, and the other 1 a centrally necrotic exophytic mass measuring 5.7 x 5.0 x 4.9 cm.  She reports no flank pain.  No dysuria.  No hematuria.  No nausea or vomiting.  She presents today to discuss these results as well as further management options.  Creatinine 0.87. [None] : no symptoms

## 2020-10-28 NOTE — PHYSICAL EXAM
[General Appearance - Well Developed] : well developed [General Appearance - Well Nourished] : well nourished [Normal Appearance] : normal appearance [Well Groomed] : well groomed [General Appearance - In No Acute Distress] : no acute distress [Abdomen Soft] : soft [Abdomen Tenderness] : non-tender [Costovertebral Angle Tenderness] : no ~M costovertebral angle tenderness [Urinary Bladder Findings] : the bladder was normal on palpation [Edema] : no peripheral edema [] : no respiratory distress [Respiration, Rhythm And Depth] : normal respiratory rhythm and effort [Exaggerated Use Of Accessory Muscles For Inspiration] : no accessory muscle use [Oriented To Time, Place, And Person] : oriented to person, place, and time [Affect] : the affect was normal [Mood] : the mood was normal [Not Anxious] : not anxious [FreeTextEntry1] : Walks with a walker [No Focal Deficits] : no focal deficits [No Palpable Adenopathy] : no palpable adenopathy

## 2020-11-05 PROBLEM — F01.50 VASCULAR DEMENTIA: Status: ACTIVE | Noted: 2020-01-01

## 2020-11-05 NOTE — PHYSICAL EXAM
[General Appearance - Well Developed] : well developed [General Appearance - Well Nourished] : well nourished [Normal Appearance] : normal appearance [Well Groomed] : well groomed [General Appearance - In No Acute Distress] : no acute distress [Abdomen Soft] : soft [Abdomen Tenderness] : non-tender [Costovertebral Angle Tenderness] : no ~M costovertebral angle tenderness [Urinary Bladder Findings] : the bladder was normal on palpation [Edema] : no peripheral edema [] : no respiratory distress [Respiration, Rhythm And Depth] : normal respiratory rhythm and effort [Exaggerated Use Of Accessory Muscles For Inspiration] : no accessory muscle use [Affect] : the affect was normal [Mood] : the mood was normal [Not Anxious] : not anxious [No Focal Deficits] : no focal deficits [No Palpable Adenopathy] : no palpable adenopathy [FreeTextEntry1] : ambulates with walker

## 2020-11-05 NOTE — HISTORY OF PRESENT ILLNESS
[FreeTextEntry1] : 72 yo F presents with history of 2 right renal mass\par 1 in the upper pole measuring 2.2 x 2.4 x 1.8 cm, and the other 1 a centrally necrotic exophytic mass measuring 5.7 x 5.0 x 4.9 cm\par Denies any flank pain\par Denies any gross hematuria, bothersome LUTS

## 2020-11-05 NOTE — ASSESSMENT
[FreeTextEntry1] : 79 yo F with two right renal mass\par \par - Reviewed imaging done at [facility]\par - Spent extensive period of time with pt and her daughter and reviewed options including active surveillance vs focal ablation vs surgical extirpation. Reviewed all risks and benefits of options. Given size and multifocality of tumors, focal ablation is not really an options. Given her comorbidiies, should she decide to proceed with surgery, would recommend radical nephrectomy to minimize risk of complications. \par - Pt's daughter also pending surgery and family is planning to move to [place]. Discussed with pt and daughter risks and benefits of delaying the procedure until she moved down to [place]. If they do this, would recommend repeating imaging in 3 months.

## 2020-11-17 NOTE — ED PROVIDER NOTE - PROGRESS NOTE DETAILS
Pt with elevated BNP, however at 1210am BP reported 89/32. gentle IV fluids (200cc) ordered BP now 119/52. Will dc lasix. Pt in no respiratory distress.

## 2020-11-17 NOTE — ED PROVIDER NOTE - OBJECTIVE STATEMENT
Chief complaint of coughing and shortness of breath worsening x 2 days. No reproted fever, no chest pain on evalaution.  Pt with frequent coughing, Chief complaint of coughing and shortness of breath worsening x 2 days. No reported fever, no chest pain on evaluation.  Pt with frequent coughing, able to speak full sentences, triage POX reported 88% RA.  Supplemental O2 administered.

## 2020-11-17 NOTE — ED PROVIDER NOTE - CLINICAL SUMMARY MEDICAL DECISION MAKING FREE TEXT BOX
MAR and Dr. Iyer endorsed. Pt agrees with admission for IV abx and respiratory monitoring.   I had a detailed discussion with the patient and/or guardian regarding the historical points, exam findings, and any diagnostic results supporting the admit diagnosis.   Note: Dr. Chavis/Patel paged earlier.

## 2020-11-18 NOTE — H&P ADULT - PROBLEM SELECTOR PLAN 2
p/w SOB  -Chest XRAY SHOWS diffuse patchy densities of the bilateral lower lung fields pna vs chf  -covid neg  -on exam fine and coarse crackles appreciated bilaterally  Patient received Rocephin and Azithromycin in ED  -f/u ESR, CRP, Lactic Acid, LDH and Procalcitonin  -SIRS Criteria= 1 (RR>20) + no certain source of infection (?lungs)  -qSOFA= 0, Not High Risk  -CURB-65= 1 point, Low risk group: 2.7% 30-day mortality.  -deescalate antibiotics for now   if spikes fever start antibiotics

## 2020-11-18 NOTE — H&P ADULT - NSHPPHYSICALEXAM_GEN_ALL_CORE
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

## 2020-11-18 NOTE — PROGRESS NOTE ADULT - PROBLEM SELECTOR PLAN 5
Hx of vascular dementia  C/w home meds Hx of vascular dementia  C/w home meds  On enhanced supervision

## 2020-11-18 NOTE — PROGRESS NOTE ADULT - PROBLEM SELECTOR PLAN 1
CXr showed diffuse patchy densities of the bilateral lower lung fields   S/p Rocephin and Azithromycin in ED  ESR 36, Lactic Acid 2.8,  and Procalcitonin 0.08  CURB-65= 1 point, Low risk group: 2.7% 30-day mortality  C/w Rocephin and Azithromycin

## 2020-11-18 NOTE — ED ADULT NURSE REASSESSMENT NOTE - NS ED NURSE REASSESS COMMENT FT1
The patient is refusing nonrebreather.  She throws it on the floor and requesting to go home.  Patient explained at length the importance of oxygenation as she is struggling to breathe.  Daughter called to speak to patient.

## 2020-11-18 NOTE — PROGRESS NOTE ADULT - PROBLEM SELECTOR PLAN 10
RISK                                                          Points  [] Previous VTE                                           3  [] Thrombophilia                                        2  [] Lower limb paralysis                              2   [] Current Cancer                                       2   [x] Immobilization > 24 hrs                        1  [] ICU/CCU stay > 24 hours                       1  [x] Age > 60                                                   1  score 2    Patient on Eliquis

## 2020-11-18 NOTE — CHART NOTE - NSCHARTNOTEFT_GEN_A_CORE
EVENT: Pt with insomnia    HPI: 80 year old female from home ambulates with walker has HHA with a PMH of kidney ca right side, HTN, HLD, DM, Vascular Dementia, h/o TIA in 3/2020, Paroxysmal Atrial Fibrillation who presented with a chief complaint of shortness of breath. Admitted for acute hypoxic respiratory failure.     SUBJECTIVE: Pt c/o difficulty sleeping    OBJECTIVE:  Vital Signs Last 24 Hrs  T(C): 36.3 (18 Nov 2020 20:43), Max: 36.8 (18 Nov 2020 07:35)  T(F): 97.4 (18 Nov 2020 20:43), Max: 98.3 (18 Nov 2020 07:35)  HR: 56 (18 Nov 2020 20:43) (56 - 95)  BP: 123/79 (18 Nov 2020 20:43) (97/67 - 134/62)  BP(mean): 72 (18 Nov 2020 10:07) (65 - 72)  RR: 19 (18 Nov 2020 20:43) (18 - 22)  SpO2: 93% (18 Nov 2020 20:43) (90% - 99%)    FOCUSED PHYSICAL EXAM:  Neuro: Awake and alert  Cardiovascular: + S1, S2, no murmurs, rubs, or bruits  Respiratory: clear to auscultation bilaterally with good air entry   GI: Abdomen soft, non-tender, bowel sounds present   : Non distended;   Skin: warm and dry; no rash    LABS:                        13.5   7.28  )-----------( 237      ( 18 Nov 2020 07:43 )             42.3   CARDIAC MARKERS ( 18 Nov 2020 07:43 )  <0.015 ng/mL / x     / x     / x     / x      CARDIAC MARKERS ( 17 Nov 2020 22:49 )  <0.015 ng/mL / x     / x     / x     / x        11-18    137  |  100  |  12  ----------------------------<  395<H>  4.1   |  25  |  1.12    Ca    8.7      18 Nov 2020 07:43  Phos  3.2     11-18  Mg     1.9     11-18    TPro  7.5  /  Alb  3.3<L>  /  TBili  0.8  /  DBili  x   /  AST  12  /  ALT  15  /  AlkPhos  83  11-18      EKG:   IMAGING:    ASSESSMENT: Insomnia likely related to hospitalization/ hospital environment.     PLAN:     - Melatonin 5 mg x 1 dose, ordered  - Reduce environmental stimuli to promote sleep     FOLLOW UP / RESULT:     -Monitor effectiveness of sleep aid

## 2020-11-18 NOTE — SWALLOW BEDSIDE ASSESSMENT ADULT - SLP PERTINENT HISTORY OF CURRENT PROBLEM
Pt is a 80 year old F from home ambulates with walker has HHA with a PMH of kidney ca right side, HTN, HLD, DM, Vascular Dementia, h/o TIA in 3/2020, Paroxysmal Atrial Fibrillation who p/w a chief complaint of shortness of breath.  Pt states prior to admit, she began to experience a rapidly progressive worsening of a non-productive cough along with shortness of breath.  Patient denies having experienced symptoms such as this before, for which she decided to come to [facility] ED for further evaluation. Pt daughter at bedside reported pt is recently diagnosed with right kidney cancer and is planed for surgery after Clear. Pt has chronic swelling of her both legs and is on lasix at home. Pt was decreased appetite recently and was cut back on diabetic medications. In the ED, pt was seen sitting up in bed, on supplemental oxygen through NC, with difficulty speaking full sentences due to frequent coughing, tachypneic into high 20's low 30s when attempting to speak.

## 2020-11-18 NOTE — H&P ADULT - PROBLEM SELECTOR PLAN 1
pT P/W Pro-BNP= 2012 (baseline= 2050 on 8/13/2020), Troponin= <0.015  -TTE (1/31/2020) identified a severely dilated left atrium.  Mild concentric left ventricular hypertrophy.  LVEF = 55 to 60%  -Chest XRAY SHOWS diffuse patchy densities of the bilateral lower lung fields pna vs chf  -covid neg  -on exam fine and coarse crackles appreciated bilaterally  - Telemetry for Continuous Cardiac Monitoring  -F/U T2,T3  -f/u repeat TTE   -Strict I&Os, Daily Weights  -Fluid Restrictions <2000 daily  -Will continue Metoprolol at lower dose than home as bp in lower side  -As patient's Blood Pressure and HR are currently stable, will start patient on low-dose Lasix 20mg daily,  -social Consult, pt consult  -Speech and Swallow eval  -Fall Precautions, Aspiration Precautions  -cardiology consulted Dr Duque

## 2020-11-18 NOTE — CONSULT NOTE ADULT - SUBJECTIVE AND OBJECTIVE BOX
CHIEF COMPLAINT: Shortness of breath    HPI: 81 yo F with HTN, HLD, DM, vascular dementia, and pAF on Eliquis who presented with dyspnea. Patient reports    PAST MEDICAL & SURGICAL HISTORY:  As above, also renal cell carcinoma, pending resection  H/O:     Allergies    No Known Allergies    MEDICATIONS  (STANDING):  apixaban 2.5 milliGRAM(s) Oral two times a day  aspirin  chewable 81 milliGRAM(s) Oral daily  atorvastatin 40 milliGRAM(s) Oral at bedtime  azithromycin  IVPB 500 milliGRAM(s) IV Intermittent every 24 hours  benzocaine 15 mG/menthol 3.6 mG (Sugar-Free) Lozenge 1 Lozenge Oral every 4 hours  cefTRIAXone   IVPB 1000 milliGRAM(s) IV Intermittent every 24 hours  dextrose 40% Gel 15 Gram(s) Oral once  dextrose 5%. 1000 milliLiter(s) (50 mL/Hr) IV Continuous <Continuous>  dextrose 5%. 1000 milliLiter(s) (100 mL/Hr) IV Continuous <Continuous>  dextrose 50% Injectable 25 Gram(s) IV Push once  dextrose 50% Injectable 12.5 Gram(s) IV Push once  dextrose 50% Injectable 25 Gram(s) IV Push once  donepezil 10 milliGRAM(s) Oral at bedtime  furosemide   Injectable 20 milliGRAM(s) IV Push daily  glucagon  Injectable 1 milliGRAM(s) IntraMuscular once  insulin lispro (ADMELOG) corrective regimen sliding scale   SubCutaneous three times a day before meals  insulin lispro Injectable (ADMELOG) 3 Unit(s) SubCutaneous three times a day before meals  metoprolol tartrate 12.5 milliGRAM(s) Oral two times a day    MEDICATIONS  (PRN):  guaiFENesin   Syrup  (Sugar-Free) 100 milliGRAM(s) Oral every 6 hours PRN Cough      FAMILY HISTORY:    No family history of premature coronary artery disease or sudden cardiac death    SOCIAL HISTORY:  Smoking-  Alcohol-  Illicit Drug use-    REVIEW OF SYSTEMS:  Constitutional: [ ] fever, [ ]weight loss,  [ ]fatigue  Eyes: [ ] visual changes  Respiratory: [ ]shortness of breath;  [ ] cough, [ ]wheezing, [ ]chills, [ ]hemoptysis  Cardiovascular: [ ] chest pain, [ ]palpitations, [ ]dizziness,  [ ]leg swelling [ ]syncope  Gastrointestinal: [ ] abdominal pain, [ ]nausea, [ ]vomiting,  [ ]diarrhea   Genitourinary: [ ] dysuria, [ ] hematuria  Neurologic: [ ] headaches [ ] tremors  [ ] weakness [ ] lightheadedness  Skin: [ ] itching, [ ]burning, [ ] rashes  Endocrine: [ ] heat or cold intolerance  Musculoskeletal: [ ] joint pain or swelling; [ ] muscle, back, or extremity pain  Psychiatric: [ ] depression, [ ]anxiety, [ ]mood swings, or [ ]difficulty sleeping  Hematologic: [ ] easy bruising, [ ] bleeding gums       [ x] All others negative	  [ ] Unable to obtain    Vital Signs Last 24 Hrs  T(C): 36.7 (2020 11:15), Max: 36.8 (2020 22:48)  T(F): 98 (2020 11:15), Max: 98.3 (2020 22:48)  HR: 87 (2020 11:15) (70 - 95)  BP: 134/62 (2020 11:15) (97/67 - 134/62)  BP(mean): 72 (2020 10:07) (65 - 72)  RR: 20 (2020 11:15) (18 - 22)  SpO2: 99% (2020 11:15) (88% - 99%)  I&O's Summary      PHYSICAL EXAM:  General: No acute distress  HEENT: EOMI, PERRL  Neck: Supple, No JVD  Lungs: Clear to auscultation bilaterally; No rales or wheezing  Heart: Regular rate and rhythm; No murmurs, rubs, or gallops  Abdomen: Nontender, bowel sounds present  Extremities: No clubbing, cyanosis, or edema  Nervous system:  Alert & Oriented X3, no focal deficits  Psychiatric: Normal affect  Skin: No rashes or lesions      LABS:      137  |  100  |  12  ----------------------------<  395<H>  4.1   |  25  |  1.12    Ca    8.7      2020 07:43  Phos  3.2       Mg     1.9         TPro  7.5  /  Alb  3.3<L>  /  TBili  0.8  /  DBili  x   /  AST  12  /  ALT  15  /  AlkPhos  83      Creatinine Trend: 1.12<--, 0.95<--, 1.03<--                        13.5   7.28  )-----------( 237      ( 2020 07:43 )             42.3     PT/INR - ( 2020 22:49 )   PT: 12.8 sec;   INR: 1.08 ratio         PTT - ( 2020 22:49 )  PTT:38.2 sec    Lipid Panel: Cholesterol, Serum 166  Direct LDL --  HDL Cholesterol, Serum 60  Triglycerides, Serum 89    Cardiac Enzymes: CARDIAC MARKERS ( 2020 07:43 )  <0.015 ng/mL / x     / x     / x     / x      CARDIAC MARKERS ( 2020 22:49 )  <0.015 ng/mL / x     / x     / x     / x          Serum Pro-Brain Natriuretic Peptide: 2164 pg/mL (20 @ 07:43)  Serum Pro-Brain Natriuretic Peptide: 2012 pg/mL (20 @ 22:49)    RADIOLOGY:   < from: Xray Chest 1 View- PORTABLE-Urgent (20 @ 22:27) >  IMPRESSION: Patchy infiltrate of the right lung base. Question small patchy infiltrate left lung base.    < end of copied text >    < from: CT Abdomen and Pelvis w/ IV Cont (10.03.20 @ 22:48) >  The right kidney demonstrates 2 heterogeneous lesions, measuring 2 cm in the upper pole and 4.8 cm in the lower pole concerning for renal cell carcinoma. Recommend further evaluation with renal mass protocol MRI or CT. No evidence of neovascularity or renal vein thrombosis.  Diverticulosis.    < end of copied text >  ECG [my interpretation]:    TELEMETRY:    ECHO:< from: TTE with Doppler (w/Cont) (20 @ 07:22) >  CONCLUSIONS:  1. Normal mitral valve. Mild mitral regurgitation.  2. Normal trileaflet aortic valve.  3. Aortic Root: 3.4 cm.    4. Severely dilated left atrium.  LA volume index = 69  cc/m2.  5. Mild concentric left ventricular hypertrophy.  6. Normal Left Ventricular Systolic Function,  (EF = 55 to  60%) No regional wall motion abnormalities.  7. Grade I diastolic dysfunction (Impaired relaxation).  8. Normal right atrium.  9. Normal right ventricular size and function.  10. Unable to estimate RVSP.  11. There is trace tricuspid regurgitation.  12. There is mild pulmonic regurgitation.  13. Normal pericardium with no pericardial effusion.    < end of copied text >      STRESS TEST:  < from: Nuclear Stress Test-Pharmacologic (20 @ 08:56) >  * The left ventricle was normal in size. Normal myocardial  perfusion scan, with no evidence of infarction or  inducible ischemia.  * Gated wall motion analysis is performed, and shows  normal wall motion with post stress LVEF of 64%.    < end of copied text >     CHIEF COMPLAINT: Shortness of breath    HPI: 79 yo F with HTN, HLD, DM, vascular dementia, and pAF on Eliquis who presented with dyspnea. Patient reports she does not remember the circumstances leading to her admission to the hospital. She states she has been having a dry cough for several weeks, but denies any associated dyspnea or chest pain. No fevers or chills. No palpitations or syncope. Patient was able to lie fully flat in bed for the duration of my evaluation, and during that time she did not have any dyspnea.     PAST MEDICAL & SURGICAL HISTORY:  As above, also renal cell carcinoma, pending resection  H/O:     Allergies    No Known Allergies    MEDICATIONS  (STANDING):  apixaban 2.5 milliGRAM(s) Oral two times a day  aspirin  chewable 81 milliGRAM(s) Oral daily  atorvastatin 40 milliGRAM(s) Oral at bedtime  azithromycin  IVPB 500 milliGRAM(s) IV Intermittent every 24 hours  benzocaine 15 mG/menthol 3.6 mG (Sugar-Free) Lozenge 1 Lozenge Oral every 4 hours  cefTRIAXone   IVPB 1000 milliGRAM(s) IV Intermittent every 24 hours  dextrose 40% Gel 15 Gram(s) Oral once  dextrose 5%. 1000 milliLiter(s) (50 mL/Hr) IV Continuous <Continuous>  dextrose 5%. 1000 milliLiter(s) (100 mL/Hr) IV Continuous <Continuous>  dextrose 50% Injectable 25 Gram(s) IV Push once  dextrose 50% Injectable 12.5 Gram(s) IV Push once  dextrose 50% Injectable 25 Gram(s) IV Push once  donepezil 10 milliGRAM(s) Oral at bedtime  furosemide   Injectable 20 milliGRAM(s) IV Push daily  glucagon  Injectable 1 milliGRAM(s) IntraMuscular once  insulin lispro (ADMELOG) corrective regimen sliding scale   SubCutaneous three times a day before meals  insulin lispro Injectable (ADMELOG) 3 Unit(s) SubCutaneous three times a day before meals  metoprolol tartrate 12.5 milliGRAM(s) Oral two times a day    MEDICATIONS  (PRN):  guaiFENesin   Syrup  (Sugar-Free) 100 milliGRAM(s) Oral every 6 hours PRN Cough      FAMILY HISTORY:    Mother: MI in her 50s    SOCIAL HISTORY:  Smoking-Denies  Alcohol-Rare  Illicit Drug use-Denies    REVIEW OF SYSTEMS:  Constitutional: [ ] fever, [ ]weight loss,  [ ]fatigue  Eyes: [ ] visual changes  Respiratory: [ ]shortness of breath;  [ ] cough, [ ]wheezing, [ ]chills, [ ]hemoptysis  Cardiovascular: [ ] chest pain, [ ]palpitations, [ ]dizziness,  [ ]leg swelling [ ]syncope  Gastrointestinal: [ ] abdominal pain, [ ]nausea, [ ]vomiting,  [ ]diarrhea   Genitourinary: [ ] dysuria, [ ] hematuria  Neurologic: [ ] headaches [ ] tremors  [ ] weakness [ ] lightheadedness  Skin: [ ] itching, [ ]burning, [ ] rashes  Endocrine: [ ] heat or cold intolerance  Musculoskeletal: [ ] joint pain or swelling; [ ] muscle, back, or extremity pain  Psychiatric: [ ] depression, [ ]anxiety, [ ]mood swings, or [ ]difficulty sleeping  Hematologic: [ ] easy bruising, [ ] bleeding gums       [ x] All others negative	  [ ] Unable to obtain    Vital Signs Last 24 Hrs  T(C): 36.7 (2020 11:15), Max: 36.8 (2020 22:48)  T(F): 98 (2020 11:15), Max: 98.3 (2020 22:48)  HR: 87 (2020 11:15) (70 - 95)  BP: 134/62 (2020 11:15) (97/67 - 134/62)  BP(mean): 72 (2020 10:07) (65 - 72)  RR: 20 (2020 11:15) (18 - 22)  SpO2: 99% (2020 11:15) (88% - 99%)  I&O's Summary      PHYSICAL EXAM:  General: No acute distress  HEENT: EOMI, PERRL  Neck: Supple, No JVD  Lungs: Clear to auscultation bilaterally; No rales or wheezing  Heart: Regular rate and rhythm; No murmurs, rubs, or gallops  Abdomen: Nontender, bowel sounds present; obese  Extremities: No clubbing, cyanosis; 1+ LE edema  Nervous system:  Alert & Oriented X3, no focal deficits  Psychiatric: Normal affect  Skin: No rashes or lesions      LABS:      137  |  100  |  12  ----------------------------<  395<H>  4.1   |  25  |  1.12    Ca    8.7      2020 07:43  Phos  3.2       Mg     1.9         TPro  7.5  /  Alb  3.3<L>  /  TBili  0.8  /  DBili  x   /  AST  12  /  ALT  15  /  AlkPhos  83      Creatinine Trend: 1.12<--, 0.95<--, 1.03<--                        13.5   7.28  )-----------( 237      ( 2020 07:43 )             42.3     PT/INR - ( 2020 22:49 )   PT: 12.8 sec;   INR: 1.08 ratio         PTT - ( 2020 22:49 )  PTT:38.2 sec    Lipid Panel: Cholesterol, Serum 166  Direct LDL --  HDL Cholesterol, Serum 60  Triglycerides, Serum 89    Cardiac Enzymes: CARDIAC MARKERS ( 2020 07:43 )  <0.015 ng/mL / x     / x     / x     / x      CARDIAC MARKERS ( 2020 22:49 )  <0.015 ng/mL / x     / x     / x     / x          Serum Pro-Brain Natriuretic Peptide: 2164 pg/mL (20 @ 07:43)  Serum Pro-Brain Natriuretic Peptide: 2012 pg/mL (20 @ 22:49)    RADIOLOGY:   < from: Xray Chest 1 View- PORTABLE-Urgent (20 @ 22:27) >  IMPRESSION: Patchy infiltrate of the right lung base. Question small patchy infiltrate left lung base.    < end of copied text >    < from: CT Abdomen and Pelvis w/ IV Cont (10.03.20 @ 22:48) >  The right kidney demonstrates 2 heterogeneous lesions, measuring 2 cm in the upper pole and 4.8 cm in the lower pole concerning for renal cell carcinoma. Recommend further evaluation with renal mass protocol MRI or CT. No evidence of neovascularity or renal vein thrombosis.  Diverticulosis.    < end of copied text >  ECG [my interpretation]: 2020: A fib, rate controlled. Motion artifact mimicking ST changes; overall EKG unchanged from prior from October.     TELEMETRY: n/a    ECHO:< from: TTE with Doppler (w/Cont) (20 @ 07:22) >  CONCLUSIONS:  1. Normal mitral valve. Mild mitral regurgitation.  2. Normal trileaflet aortic valve.  3. Aortic Root: 3.4 cm.    4. Severely dilated left atrium.  LA volume index = 69  cc/m2.  5. Mild concentric left ventricular hypertrophy.  6. Normal Left Ventricular Systolic Function,  (EF = 55 to  60%) No regional wall motion abnormalities.  7. Grade I diastolic dysfunction (Impaired relaxation).  8. Normal right atrium.  9. Normal right ventricular size and function.  10. Unable to estimate RVSP.  11. There is trace tricuspid regurgitation.  12. There is mild pulmonic regurgitation.  13. Normal pericardium with no pericardial effusion.    < end of copied text >      STRESS TEST:  < from: Nuclear Stress Test-Pharmacologic (20 @ 08:56) >  * The left ventricle was normal in size. Normal myocardial  perfusion scan, with no evidence of infarction or  inducible ischemia.  * Gated wall motion analysis is performed, and shows  normal wall motion with post stress LVEF of 64%.    < end of copied text >

## 2020-11-18 NOTE — PROGRESS NOTE ADULT - PROBLEM SELECTOR PLAN 4
[facility] looking to see if pt has enough medicare days to come there skilled, if not pt will have to go back to [address] [place] [address] and rehab until her family can find another LTC facility. Pt was LTC at [address] [place] [address] PTA, family wants to switch faculties but the pt has [facility] . 8/7/18 [id number] [facility] has accepted this pt as she has enough skilled Medicare days. They will take her under Medicare skilled and work on transitioning her to [place] [facility].  Will follow Pt diagnosed with renal cancer on Oct 2020, planned surgery December 2020   CT Abdomen/Pelvis with IV contrast (10/3/2020) identified  right kidney demonstrates 2 heterogeneous lesions, measuring 2 cm in the upper pole and 4.8 cm in the lower pole concerning for renal cell carcinoma. No evidence of neovascularity or renal vein thrombosis.

## 2020-11-18 NOTE — CONSULT NOTE ADULT - ASSESSMENT
81 yo F with HTN, HLD, DM, vascular dementia, and pAF on Eliquis who presented with dyspnea.      1. Acute on chronic diastolic HF exacerbation:  -On lasix 20mg IV     2. A fib:   -CHADS2-VASc score is 7 (HTN, age+2, DM, CVA+2, female), consistent with ~10% annual risk of stroke if off systemic anticoagulation  -Would change to Eliquis 5mg PO BID (weight > 60kg, creatinine <1.5, age <80)  -Rate controlled on metoprolol (target HR <110 bpm)    3. HTN: On metoprolol 12.5mg PO BID  -Also on amlodipine 10mg and losartan 100mg at home. Can restart medications at lower doses as hemodynamically tolerated     4. HLD: On atorvastatin    ***Note that this is a preliminary note and any recommendations should NOT be carried out until this note is finalized. *** 79 yo F with HTN, HLD, DM, vascular dementia, and pAF on Eliquis who presented with dyspnea, likely due to pneumonia with secondary mild HF exacerbation.       1. Acute on chronic diastolic HF exacerbation: Patient seems mostly euvolemic on exam, and her pro-BNP has been mildly elevated in a similar range for the past several admissions   -On lasix 20mg IV; she can probably be placed on maintenance PO furosemide dosing tomorrow (20mg PO daily)     2. A fib:   -CHADS2-VASc score is 7 (HTN, age+2, DM, CVA+2, female), consistent with ~10% annual risk of stroke if off systemic anticoagulation  -Would change to Eliquis 5mg PO BID (weight > 60kg, creatinine <1.5, age <80)  -Rate controlled on metoprolol (target HR <110 bpm)    3. HTN: On metoprolol 12.5mg PO BID  -Also on amlodipine 10mg and losartan 100mg at home. Can restart medications at lower doses as hemodynamically tolerated     4. HLD: On atorvastatin

## 2020-11-18 NOTE — SWALLOW BEDSIDE ASSESSMENT ADULT - SWALLOW EVAL: DIAGNOSIS
Oropharyngeal swallow sequence appears WFL to tolerate a regular texture diet with no overt s/s of laryngeal penetration or aspiration

## 2020-11-18 NOTE — CONSULT NOTE ADULT - SUBJECTIVE AND OBJECTIVE BOX
Time of visit:    CHIEF COMPLAINT: Patient is a 80y old  Female who presents with a chief complaint of Acute hypoxic respiratory failure (2020 14:03)      HPI:  Patient is a 80 year old female from home ambulates with walker has HHA with a PMH of kidney ca right side, HTN, HLD, DM, Vascular Dementia, h/o TIA in 3/2020, Paroxysmal Atrial Fibrillation who presented with a chief complaint of shortness of breath.  Patient states that beginning approximately 2 days prior to current presentation, she began to experience a rapidly progressive worsening of a non-productive cough along with shortness of breath.  Patient denies having experienced symptoms such as this before, for which she decided to come to [facility] ED for further evaluation.pT daughter at bedside reports pt is recently diagnosed with right kidney cancer and is planed for surgery after [facility]. Pt has chronic swelling of her both legs and is on lasix at home. Pt was decreased appetite recently and was cut back on diabetic medications.    In the ED, patient was seen sitting up in bed, on supplemental oxygen through [place], with difficulty speaking full sentences due to frequent coughing, tachypneic into high 20's low 30s when attempting to speak.  ICU was consulted, not a candidate at this time.  (2020 02:23)   Patient seen and examined.     PAST MEDICAL & SURGICAL HISTORY:  Afib    Dementia    HLD (hyperlipidemia)    HTN (hypertension)    DM (diabetes mellitus)    H/O:         Allergies    No Known Allergies    Intolerances        MEDICATIONS  (STANDING):  apixaban 2.5 milliGRAM(s) Oral two times a day  aspirin  chewable 81 milliGRAM(s) Oral daily  atorvastatin 40 milliGRAM(s) Oral at bedtime  azithromycin  IVPB 500 milliGRAM(s) IV Intermittent every 24 hours  benzocaine 15 mG/menthol 3.6 mG (Sugar-Free) Lozenge 1 Lozenge Oral every 4 hours  cefTRIAXone   IVPB 1000 milliGRAM(s) IV Intermittent every 24 hours  dextrose 40% Gel 15 Gram(s) Oral once  dextrose 5%. 1000 milliLiter(s) (50 mL/Hr) IV Continuous <Continuous>  dextrose 5%. 1000 milliLiter(s) (100 mL/Hr) IV Continuous <Continuous>  dextrose 50% Injectable 25 Gram(s) IV Push once  dextrose 50% Injectable 12.5 Gram(s) IV Push once  dextrose 50% Injectable 25 Gram(s) IV Push once  donepezil 10 milliGRAM(s) Oral at bedtime  furosemide   Injectable 20 milliGRAM(s) IV Push daily  glucagon  Injectable 1 milliGRAM(s) IntraMuscular once  insulin lispro (ADMELOG) corrective regimen sliding scale   SubCutaneous three times a day before meals  insulin lispro Injectable (ADMELOG) 3 Unit(s) SubCutaneous three times a day before meals  metoprolol tartrate 12.5 milliGRAM(s) Oral two times a day      MEDICATIONS  (PRN):  guaiFENesin   Syrup  (Sugar-Free) 100 milliGRAM(s) Oral every 6 hours PRN Cough   Medications up to date at time of exam.    Medications up to date at time of exam.    FAMILY HISTORY:      SOCIAL HISTORY  Smoking History: [ x  ]  non smoker + second hand smoke exposure from spouse   Living Condition: [   ] apartment, [   ] private house  Work History:   Travel History: denies recent travel  Illicit Substance Use: denies  Alcohol Use: denies    REVIEW OF SYSTEMS: as per EMR    CONSTITUTIONAL:  denies fevers, chills, sweats, weight loss    HEENT:  denies diplopia or blurred vision, sore throat or runny nose.    CARDIOVASCULAR:  denies pressure, squeezing, tightness, or heaviness about the chest; no palpitations.    RESPIRATORY:  denies SOB, cough, HASTINGS, wheezing.    GASTROINTESTINAL:  denies abdominal pain, nausea, vomiting or diarrhea.    GENITOURINARY: denies dysuria, frequency or urgency.    NEUROLOGIC:  denies numbness, tingling, seizures or weakness.    PSYCHIATRIC:  denies disorder of thought or mood.    MSK: denies swelling, redness      PHYSICAL EXAMINATION:    GENERAL: The patient is a well-developed, well-nourished, in no apparent distress.     Vital Signs Last 24 Hrs  T(C): 36.7 (2020 11:15), Max: 36.8 (2020 22:48)  T(F): 98 (2020 11:15), Max: 98.3 (2020 22:48)  HR: 87 (2020 11:15) (70 - 95)  BP: 134/62 (2020 11:15) (97/67 - 134/62)  BP(mean): 72 (2020 10:07) (65 - 72)  RR: 20 (2020 11:15) (18 - 22)  SpO2: 99% (2020 11:15) (88% - 99%)   (if applicable)    Chest Tube (if applicable)    HEENT: Head is normocephalic and atraumatic. Extraocular muscles are intact. Mucous membranes are moist.     NECK: Supple, no palpable adenopathy.    LUNGS: Clear to auscultation, no wheezing, rales, or rhonchi.    HEART: Regular rate and rhythm without murmur.    ABDOMEN: Soft, nontender, and nondistended.  No hepatosplenomegaly is noted.    RENAL: No difficulty voiding, no pelvic pain    EXTREMITIES: b/l lower ext edema, poor feet hygiene / congenital hand malformation     NEUROLOGIC: Awake, alert, oriented, grossly intact    SKIN: Warm, dry, good turgor.      LABS:                        13.5   7.28  )-----------( 237      ( 2020 07:43 )             42.3         137  |  100  |  12  ----------------------------<  395<H>  4.1   |  25  |  1.12    Ca    8.7      2020 07:43  Phos  3.2       Mg     1.9         TPro  7.5  /  Alb  3.3<L>  /  TBili  0.8  /  DBili  x   /  AST  12  /  ALT  15  /  AlkPhos  83  -18    PT/INR - ( 2020 22:49 )   PT: 12.8 sec;   INR: 1.08 ratio         PTT - ( 2020 22:49 )  PTT:38.2 sec    ABG - ( 2020 01:11 )  pH, Arterial: 7.37  pH, Blood: x     /  pCO2: 56    /  pO2: 77    / HCO3: 31    / Base Excess: 5.1   /  SaO2: 95                CARDIAC MARKERS ( 2020 07:43 )  <0.015 ng/mL / x     / x     / x     / x      CARDIAC MARKERS ( 2020 22:49 )  <0.015 ng/mL / x     / x     / x     / x            Serum Pro-Brain Natriuretic Peptide: 2164 pg/mL (20 @ 07:43)  Serum Pro-Brain Natriuretic Peptide: 2012 pg/mL (20 @ 22:49)    Lactate, Blood: 2.8 mmol/L (20 @ 07:45)  Lactate, Blood: 2.1 mmol/L (20 @ 22:49)    Procalcitonin, Serum: 0.08 ng/mL (20 @ 09:34)      MICROBIOLOGY: (if applicable)    RADIOLOGY & ADDITIONAL STUDIES:  EKG:   CXR:< from: Xray Chest 1 View- PORTABLE-Urgent (20 @ 22:27) >    EXAM:  XR CHEST PORTABLE URGENT 1V                            PROCEDURE DATE:  2020          INTERPRETATION:  HISTORY: Chest pain. Shortness of breath.    EXAM:  CXR.    COMPARISON: 10/3/2020.    Single frontal  radiograph of the chest. Cardiac silhouette is enlarged, stable. Left-sided aorta with calcification of the aortic arch. There is a new patchy opacity of the right lung base. Question patchy opacity left lung base. No significant pleural effusion or pneumothorax.    IMPRESSION: Patchy infiltrate of the right lung base. Question small patchy infiltrate left lung base.            LAKEISHA MATHEW MD; Attending Radiologist  This document has been electronically signed. 2020  5:06AM    < end of copied text >    ECHO:    IMPRESSION: 80y Female PAST MEDICAL & SURGICAL HISTORY:  Afib    Dementia    HLD (hyperlipidemia)    HTN (hypertension)    DM (diabetes mellitus)    H/O:      p/w         IMP: This is a 80 yr old woman with  + second smoke exposure from late  ,uncontrol DM, HTN, HDL, vascular dementia, CAD, afib  admitted for acute hypoxic hypercapnic resp failure due to a combination of b/l PNA and heart failure. Covid-19 neg.    - Acute Hypoxic Hypercapnic Resp Failure  - PAN.. b/l.. CAP  - Pul edema  - Heart Hailure  - DM -2 uncontrol   - HLD  - CAD/ Afib        Sugg;  -continue ACS work up  -Diuresis   -echo  -cards eval  -O2 supp , keep sat>90%  -continue antibx for CAP coverage  -f/u cultures  -repeat CXR in 48 hours  -blood sugar control   -dvt/gi prophy    c/d  with Attend/ House staff

## 2020-11-18 NOTE — H&P ADULT - PROBLEM SELECTOR PLAN 10
RISK                                                          Points  [] Previous VTE                                           3  [] Thrombophilia                                        2  [] Lower limb paralysis                              2   [] Current Cancer                                       2   [x] Immobilization > 24 hrs                        1  [] ICU/CCU stay > 24 hours                       1  [x] Age > 60                                                   1    score 2

## 2020-11-18 NOTE — PROGRESS NOTE ADULT - SUBJECTIVE AND OBJECTIVE BOX
PGY-1 Progress Note discussed with attending    PAGER #: [[phone]] TILL 5:00 PM  PLEASE CONTACT ON CALL TEAM:  - On Call Team (Please refer to Ximena) FROM 5:00 PM - 8:30PM  - Nightfloat Team FROM 8:30 -7:30 AM    CHIEF COMPLAINT & BRIEF HOSPITAL COURSE:      INTERVAL HPI/OVERNIGHT EVENTS:       REVIEW OF SYSTEMS:  CONSTITUTIONAL: No fever, weight loss, or fatigue  RESPIRATORY: No cough, wheezing, chills or hemoptysis; No shortness of breath  CARDIOVASCULAR: No chest pain, palpitations, dizziness, or leg swelling  GASTROINTESTINAL: No abdominal pain. No nausea, vomiting, or hematemesis; No diarrhea or constipation. No melena or hematochezia.  GENITOURINARY: No dysuria or hematuria, urinary frequency  NEUROLOGICAL: No headaches, memory loss, loss of strength, numbness, or tremors  SKIN: No itching, burning, rashes, or lesions     MEDICATIONS  (STANDING):  apixaban 2.5 milliGRAM(s) Oral two times a day  aspirin  chewable 81 milliGRAM(s) Oral daily  atorvastatin 40 milliGRAM(s) Oral at bedtime  azithromycin  IVPB 500 milliGRAM(s) IV Intermittent every 24 hours  benzocaine 15 mG/menthol 3.6 mG (Sugar-Free) Lozenge 1 Lozenge Oral every 4 hours  cefTRIAXone   IVPB 1000 milliGRAM(s) IV Intermittent every 24 hours  dextrose 40% Gel 15 Gram(s) Oral once  dextrose 5%. 1000 milliLiter(s) (50 mL/Hr) IV Continuous <Continuous>  dextrose 5%. 1000 milliLiter(s) (100 mL/Hr) IV Continuous <Continuous>  dextrose 50% Injectable 25 Gram(s) IV Push once  dextrose 50% Injectable 12.5 Gram(s) IV Push once  dextrose 50% Injectable 25 Gram(s) IV Push once  donepezil 10 milliGRAM(s) Oral at bedtime  furosemide   Injectable 20 milliGRAM(s) IV Push daily  glucagon  Injectable 1 milliGRAM(s) IntraMuscular once  insulin lispro (ADMELOG) corrective regimen sliding scale   SubCutaneous every 6 hours  metoprolol tartrate 12.5 milliGRAM(s) Oral two times a day    MEDICATIONS  (PRN):  guaiFENesin   Syrup  (Sugar-Free) 100 milliGRAM(s) Oral every 6 hours PRN Cough      Vital Signs Last 24 Hrs  T(C): 36.8 (18 Nov 2020 07:35), Max: 36.8 (17 Nov 2020 22:48)  T(F): 98.3 (18 Nov 2020 07:35), Max: 98.3 (17 Nov 2020 22:48)  HR: 84 (18 Nov 2020 07:35) (70 - 87)  BP: 109/60 (18 Nov 2020 07:35) (108/91 - 131/63)  BP(mean): --  RR: 18 (18 Nov 2020 07:35) (18 - 22)  SpO2: 92% (18 Nov 2020 07:35) (88% - 97%)    PHYSICAL EXAMINATION:  GENERAL: NAD, well built  HEAD:  Atraumatic, Normocephalic  EYES:  conjunctiva and sclera clear  NECK: Supple, No JVD, Normal thyroid  CHEST/LUNG: Clear to auscultation. Clear to percussion bilaterally; No rales, rhonchi, wheezing, or rubs  HEART: Regular rate and rhythm; No murmurs, rubs, or gallops  ABDOMEN: Soft, Nontender, Nondistended; Bowel sounds present  NERVOUS SYSTEM:  Alert & Oriented X3,    EXTREMITIES:  2+ Peripheral Pulses, No clubbing, cyanosis, or edema  SKIN: warm dry                          13.5   7.28  )-----------( 237      ( 18 Nov 2020 07:43 )             42.3     11-18    137  |  100  |  12  ----------------------------<  395<H>  4.1   |  25  |  x     Ca    8.7      18 Nov 2020 07:43  Phos  3.2     11-18    TPro  x   /  Alb  3.3<L>  /  TBili  0.8  /  DBili  x   /  AST  x   /  ALT  x   /  AlkPhos  83  11-18    LIVER FUNCTIONS - ( 18 Nov 2020 07:43 )  Alb: 3.3 g/dL / Pro: x     / ALK PHOS: 83 U/L / ALT: x     / AST: x     / GGT: x           CARDIAC MARKERS ( 18 Nov 2020 07:43 )  <0.015 ng/mL / x     / x     / x     / x      CARDIAC MARKERS ( 17 Nov 2020 22:49 )  <0.015 ng/mL / x     / x     / x     / x          PT/INR - ( 17 Nov 2020 22:49 )   PT: 12.8 sec;   INR: 1.08 ratio         PTT - ( 17 Nov 2020 22:49 )  PTT:38.2 sec    I&O's Summary          CAPILLARY BLOOD GLUCOSE      RADIOLOGY & ADDITIONAL TESTS:                   PGY-1 Progress Note discussed with attending    PAGER #: [[phone]] TILL 5:00 PM  PLEASE CONTACT ON CALL TEAM:  - On Call Team (Please refer to Ximena) FROM 5:00 PM - 8:30PM  - Nightfloat Team FROM 8:30 -7:30 AM    CHIEF COMPLAINT & BRIEF HOSPITAL COURSE:  80 year old female from home ambulates with walker has HHA with a PMH of kidney ca right side, HTN, HLD, DM, Vascular Dementia, h/o TIA in 3/2020, Paroxysmal Atrial Fibrillation, chronic leg swelling (on lasix) who presented to ED for low O2 saturation. Patient denies chest pain, sob, palpitations, dizziness, lightheadedness.   In the ED, patient was seen sitting up in bed, on supplemental oxygen via NC, with difficulty speaking full sentences due to frequent coughing, tachypneic into high 20's low 30s when attempting to speak.  Patient admitted to medicine for pneumonia, started on Rocephin and Azithromycin.        INTERVAL HPI/OVERNIGHT EVENTS: patient denies coughing or discomfort. Patient is alert and anxious at times.       MEDICATIONS  (STANDING):  apixaban 2.5 milliGRAM(s) Oral two times a day  aspirin  chewable 81 milliGRAM(s) Oral daily  atorvastatin 40 milliGRAM(s) Oral at bedtime  azithromycin  IVPB 500 milliGRAM(s) IV Intermittent every 24 hours  benzocaine 15 mG/menthol 3.6 mG (Sugar-Free) Lozenge 1 Lozenge Oral every 4 hours  cefTRIAXone   IVPB 1000 milliGRAM(s) IV Intermittent every 24 hours  dextrose 40% Gel 15 Gram(s) Oral once  dextrose 5%. 1000 milliLiter(s) (50 mL/Hr) IV Continuous <Continuous>  dextrose 5%. 1000 milliLiter(s) (100 mL/Hr) IV Continuous <Continuous>  dextrose 50% Injectable 25 Gram(s) IV Push once  dextrose 50% Injectable 12.5 Gram(s) IV Push once  dextrose 50% Injectable 25 Gram(s) IV Push once  donepezil 10 milliGRAM(s) Oral at bedtime  furosemide   Injectable 20 milliGRAM(s) IV Push daily  glucagon  Injectable 1 milliGRAM(s) IntraMuscular once  insulin lispro (ADMELOG) corrective regimen sliding scale   SubCutaneous every 6 hours  metoprolol tartrate 12.5 milliGRAM(s) Oral two times a day    MEDICATIONS  (PRN):  guaiFENesin   Syrup  (Sugar-Free) 100 milliGRAM(s) Oral every 6 hours PRN Cough      Vital Signs Last 24 Hrs  T(C): 36.8 (18 Nov 2020 07:35), Max: 36.8 (17 Nov 2020 22:48)  T(F): 98.3 (18 Nov 2020 07:35), Max: 98.3 (17 Nov 2020 22:48)  HR: 84 (18 Nov 2020 07:35) (70 - 87)  BP: 109/60 (18 Nov 2020 07:35) (108/91 - 131/63)  BP(mean): --  RR: 18 (18 Nov 2020 07:35) (18 - 22)  SpO2: 92% (18 Nov 2020 07:35) (88% - 97%)    PHYSICAL EXAMINATION:  GENERAL: NAD, obese  HEAD:  Atraumatic, Normocephalic  EYES:  conjunctiva and sclera clear  NECK: Supple, No JVD, Normal thyroid  CHEST/LUNG: Clear to auscultation. Clear to percussion bilaterally; No rales, rhonchi, wheezing, or rubs  HEART: Regular rate and rhythm; No murmurs, rubs, or gallops  ABDOMEN: Abundant adipose tissue, Soft, Nontender, Nondistended; Bowel sounds present, no pain on palpation  NERVOUS SYSTEM:  Alert & Oriented X2, screaming at times  EXTREMITIES:  2+ Peripheral Pulses, No clubbing, cyanosis, or edema  SKIN: warm dry                          13.5   7.28  )-----------( 237      ( 18 Nov 2020 07:43 )             42.3     11-18    137  |  100  |  12  ----------------------------<  395<H>  4.1   |  25  |  x     Ca    8.7      18 Nov 2020 07:43  Phos  3.2     11-18    TPro  x   /  Alb  3.3<L>  /  TBili  0.8  /  DBili  x   /  AST  x   /  ALT  x   /  AlkPhos  83  11-18    LIVER FUNCTIONS - ( 18 Nov 2020 07:43 )  Alb: 3.3 g/dL / Pro: x     / ALK PHOS: 83 U/L / ALT: x     / AST: x     / GGT: x           CARDIAC MARKERS ( 18 Nov 2020 07:43 )  <0.015 ng/mL / x     / x     / x     / x      CARDIAC MARKERS ( 17 Nov 2020 22:49 )  <0.015 ng/mL / x     / x     / x     / x          PT/INR - ( 17 Nov 2020 22:49 )   PT: 12.8 sec;   INR: 1.08 ratio         PTT - ( 17 Nov 2020 22:49 )  PTT:38.2 sec    I&O's Summary                         PGY-1 Progress Note discussed with attending    PAGER #: [[phone]] TILL 5:00 PM  PLEASE CONTACT ON CALL TEAM:  - On Call Team (Please refer to Ximena) FROM 5:00 PM - 8:30PM  - Nightfloat Team FROM 8:30 -7:30 AM    CHIEF COMPLAINT & BRIEF HOSPITAL COURSE:  80 year old female from home ambulates with walker has HHA with a PMH of kidney ca right side, HTN, HLD, DM, Vascular Dementia, h/o TIA in 3/2020, Paroxysmal Atrial Fibrillation, chronic leg swelling (on lasix) who presented to ED for low O2 saturation. Patient denies chest pain, sob, palpitations, dizziness, lightheadedness.   In the ED, patient was seen sitting up in bed, on supplemental oxygen via NC, with difficulty speaking full sentences due to frequent coughing, tachypneic into high 20's low 30s when attempting to speak.  Patient admitted to medicine for pneumonia, started on Rocephin and Azithromycin.        INTERVAL HPI/OVERNIGHT EVENTS: patient denies coughing or discomfort. Patient is alert and anxious at times.       MEDICATIONS  (STANDING):  apixaban 2.5 milliGRAM(s) Oral two times a day  aspirin  chewable 81 milliGRAM(s) Oral daily  atorvastatin 40 milliGRAM(s) Oral at bedtime  azithromycin  IVPB 500 milliGRAM(s) IV Intermittent every 24 hours  benzocaine 15 mG/menthol 3.6 mG (Sugar-Free) Lozenge 1 Lozenge Oral every 4 hours  cefTRIAXone   IVPB 1000 milliGRAM(s) IV Intermittent every 24 hours  dextrose 40% Gel 15 Gram(s) Oral once  dextrose 5%. 1000 milliLiter(s) (50 mL/Hr) IV Continuous <Continuous>  dextrose 5%. 1000 milliLiter(s) (100 mL/Hr) IV Continuous <Continuous>  dextrose 50% Injectable 25 Gram(s) IV Push once  dextrose 50% Injectable 12.5 Gram(s) IV Push once  dextrose 50% Injectable 25 Gram(s) IV Push once  donepezil 10 milliGRAM(s) Oral at bedtime  furosemide   Injectable 20 milliGRAM(s) IV Push daily  glucagon  Injectable 1 milliGRAM(s) IntraMuscular once  insulin lispro (ADMELOG) corrective regimen sliding scale   SubCutaneous every 6 hours  metoprolol tartrate 12.5 milliGRAM(s) Oral two times a day    MEDICATIONS  (PRN):  guaiFENesin   Syrup  (Sugar-Free) 100 milliGRAM(s) Oral every 6 hours PRN Cough      Vital Signs Last 24 Hrs  T(C): 36.8 (18 Nov 2020 07:35), Max: 36.8 (17 Nov 2020 22:48)  T(F): 98.3 (18 Nov 2020 07:35), Max: 98.3 (17 Nov 2020 22:48)  HR: 84 (18 Nov 2020 07:35) (70 - 87)  BP: 109/60 (18 Nov 2020 07:35) (108/91 - 131/63)  BP(mean): --  RR: 18 (18 Nov 2020 07:35) (18 - 22)  SpO2: 92% (18 Nov 2020 07:35) (88% - 97%)    PHYSICAL EXAMINATION:  GENERAL: NAD, obese  HEAD:  Atraumatic, Normocephalic  EYES:  conjunctiva and sclera clear  NECK: Supple, No JVD, Normal thyroid  CHEST/LUNG: Clear to auscultation. Clear to percussion bilaterally; No rales, rhonchi, wheezing, or rubs  HEART: Regular rate and rhythm; No murmurs, rubs, or gallops  ABDOMEN: Abundant adipose tissue, Soft, Nontender, Nondistended; Bowel sounds present, no pain on palpation  NERVOUS SYSTEM:  Alert & Oriented X2, screaming at times  EXTREMITIES:  2+ Peripheral Pulses, No clubbing or cyanosis. Bilateral pitting edema of lower extremities, right hand 4 fingers and left hand 5 fingers with some degree of deformation, diffuse articular pain   SKIN: warm dry                          13.5   7.28  )-----------( 237      ( 18 Nov 2020 07:43 )             42.3     11-18    137  |  100  |  12  ----------------------------<  395<H>  4.1   |  25  |  x     Ca    8.7      18 Nov 2020 07:43  Phos  3.2     11-18    TPro  x   /  Alb  3.3<L>  /  TBili  0.8  /  DBili  x   /  AST  x   /  ALT  x   /  AlkPhos  83  11-18    LIVER FUNCTIONS - ( 18 Nov 2020 07:43 )  Alb: 3.3 g/dL / Pro: x     / ALK PHOS: 83 U/L / ALT: x     / AST: x     / GGT: x           CARDIAC MARKERS ( 18 Nov 2020 07:43 )  <0.015 ng/mL / x     / x     / x     / x      CARDIAC MARKERS ( 17 Nov 2020 22:49 )  <0.015 ng/mL / x     / x     / x     / x          PT/INR - ( 17 Nov 2020 22:49 )   PT: 12.8 sec;   INR: 1.08 ratio         PTT - ( 17 Nov 2020 22:49 )  PTT:38.2 sec    I&O's Summary

## 2020-11-18 NOTE — H&P ADULT - HISTORY OF PRESENT ILLNESS
Patient is a 80 year old female from home ambulates with walker has HHA with a PMH of kidney ca right side, HTN, HLD, DM, Vascular Dementia, h/o TIA in 3/2020, Paroxysmal Atrial Fibrillation who presented with a chief complaint of shortness of breath.  Patient states that beginning approximately 2 days prior to current presentation, she began to experience a rapidly progressive worsening of a non-productive cough along with shortness of breath.  Patient denies having experienced symptoms such as this before, for which [facility].pT daughter at bedside reports pt is recently diagnosed with right kidney cancer and is planed for surgery after [place]. Pt has chronic swelling of her both legs and is on lasix at home. Pt was decreased appetite recently and was cut back on diabetic medications.    In the ED, patient was seen sitting up in bed, on supplemental oxygen through NC, with difficulty speaking full sentences due to frequent coughing, tachypneic into high 20's low 30s when attempting to speak.  ICU was consulted, not a candidate at this time.

## 2020-11-18 NOTE — SWALLOW BEDSIDE ASSESSMENT ADULT - SWALLOW EVAL: RECOMMENDED FEEDING/EATING TECHNIQUES
allow for swallow between intakes/small sips/bites/position upright (90 degrees)/maintain upright posture during/after eating for 30 mins/oral hygiene

## 2020-11-18 NOTE — H&P ADULT - ASSESSMENT
Patient is a 80 year old female from home ambulates with walker has HHA with a PMH of kidney ca right side, HTN, HLD, DM, Vascular Dementia, h/o TIA in 3/2020, Paroxysmal Atrial Fibrillation who presented with a chief complaint of shortness of breath. Admitted for acute hypoxic respiratory failure.     Medications confirmed with daughter list of home meds

## 2020-11-18 NOTE — H&P ADULT - PROBLEM SELECTOR PLAN 4
Pt recently diagnosed with renal cancer and has plans for surgery in [place]   CT Abdomen/Pelvis with IV contrast (10/3/2020) identified  right kidney demonstrates 2 heterogeneous lesions, measuring 2 cm in the upper pole and 4.8 cm in the lower pole concerning for renal cell carcinoma. Recommend further evaluation with renal mass protocol MRI or CT. No evidence of neovascularity or renal vein thrombosis.  -eGFR= 52 (baseline= 36 on 10/3/2020)  -Will send Urinalysis and Urinary Electrolytes (Sodium, Potassium, Creatinine, Chloride)  -Will hold IVF hydration, for now  -May need Bilateral Renal Sonogram

## 2020-11-18 NOTE — H&P ADULT - ATTENDING COMMENTS
Patient is a 80 year old female with a PMH of HTN, HLD, DM, Vascular Dementia, h/o TIA in 3/2020, Paroxysmal Atrial Fibrillation (not on AC) who presented with a chief complaint of shortness of breath.  Patient states that beginning approximately 2 days prior to current presentation, she began to experience a rapidly progressive worsening of a non-productive cough along with shortness of breath.  Patient denies having experienced symptoms such as this before, for which she decided to come to [facility] ED for further evaluation.    In the ED, patient was seen sitting up in bed, intermittent wearing her supplemental oxygen, with difficulty speaking full sentences due to frequent coughing, tachypneic into high 20's low 30s when attempting to speak.  ICU was consulted, not a candidate at this time.    T(C): 36.3 (11-18-20 @ 00:26), Max: 36.8 (11-17-20 @ 22:48)  T(F): 97.4 (11-18-20 @ 00:26), Max: 98.3 (11-17-20 @ 22:48)  HR: 74 (11-18-20 @ 00:26) (70 - 74)  BP: 119/52 (11-18-20 @ 00:26) (119/52 - 131/63)  RR: 21 (11-18-20 @ 00:26) (20 - 21)  SpO2: 95% (11-18-20 @ 00:26) (88% - 95%)  Wt(kg): --    P/E: As above MAR    A/P:    Shortness of Breath most probably due to Acute CHF Exacerbation:  -Pro-BNP= 2012 (baseline= 2050 on 8/13/2020)  -Troponin= <0.015  -TTE (1/31/2020) identified a severely dilated left atrium.  Mild concentric left ventricular hypertrophy.  LVEF = 55 to 60% with No regional wall motion abnormalities.  Grade I diastolic dysfunction (Impaired relaxation).  Though unable to estimate RVSP.  -Chest film, although portable, demonstrates appreciably enlarged heart size with a straightened left heart border, with diffuse patchy densities of the bilateral lower lung fields.  -COVID still pending in ED  -SIRS Criteria= 1 (RR>20) + no certain source of infection (?lungs)  -qSOFA= 0, Not High Risk  -CURB-65= 1 point, Low risk group: 2.7% 30-day mortality.    -Patient received Rocephin and Azithromycin in ED  -Will send ESR, CRP, Lactic Acid, LDH and Procalcitonin  -TTE (1/31/2020) identified a severely dilated left atrium.  Mild concentric left ventricular hypertrophy.  LVEF = 55 to 60% with No regional wall motion abnormalities.  Grade I diastolic dysfunction (Impaired relaxation).  Though unable to estimate RVSP.  -Will admit patient to Telemetry for Continuous Cardiac Monitoring  -Will continue to trend Troponin with simultaneous acquisition of EKG  -Strict I&Os, Daily Weights  -Fluid Restrictions <2000 daily  -Will continue patient's Metoprolol  -As patient's Blood Pressure and HR are currently stable, will start patient on low-dose Lasix 20mg daily, for now  -Rehab Consult  -Speech and Swallow eval  -Will need to ask Cardiology to evaluate patient for additional recommendations    HTN:  -As patient was initially hypotensive, will resume patient's Metoprolol, though will hold additional antihypertensives, for now  -Vital Signs Q2H    Chronic Renal Insufficiency:  -CT Abdomen/Pelvis with IV contrast (10/3/2020) identified  right kidney demonstrates 2 heterogeneous lesions, measuring 2 cm in the upper pole and 4.8 cm in the lower pole concerning for renal cell carcinoma. Recommend further evaluation with renal mass protocol MRI or CT. No evidence of neovascularity or renal vein thrombosis.  -eGFR= 52 (baseline= 36 on 10/3/2020)  -Will send Urinalysis and Urinary Electrolytes (Sodium, Potassium, Creatinine, Chloride)  -Will hold IVF hydration, for now  -May need to consider obtaining Bilateral Renal Sonogram    DM:  -Hemoglobin A1c= 9.7% on 3/11/2020  -Hemoglobin A1c with AM labs  -Blood Glucose Monitoring ACHS  -Regular Insulin Sliding Scale ACHS  -NPO (except meds), for now until Speech and Swallow eval then can start Carb Controlled, Heart Healthy, Renal (Non-Dialysis) diet as tolerated    Hypokalemia:  -Will continue to replete and recheck and necessary    HLD:  -Lipid Panel with AM labs  -Will resume patient's home medications    Dementia:  -Will resume patient's Aspirin, Statin    Hypoalbuminemia:  -Nutrition Consult    GI/DVT PPx:  -Heparin  -Pepcid Patient is a 80 year old female with a PMH of HTN, HLD, DM, Vascular Dementia, h/o TIA in 3/2020, Paroxysmal Atrial Fibrillation who presented with a chief complaint of shortness of breath.  Patient states that beginning approximately 2 days prior to current presentation, she began to experience a rapidly progressive worsening of a non-productive cough along with shortness of breath.  Patient denies having experienced symptoms such as this before, for which she decided to come to [facility] ED for further evaluation.    In the ED, patient was seen sitting up in bed, intermittent wearing her supplemental oxygen, with difficulty speaking full sentences due to frequent coughing, tachypneic into high 20's low 30s when attempting to speak.  ICU was consulted, not a candidate at this time.    T(C): 36.3 (11-18-20 @ 00:26), Max: 36.8 (11-17-20 @ 22:48)  T(F): 97.4 (11-18-20 @ 00:26), Max: 98.3 (11-17-20 @ 22:48)  HR: 74 (11-18-20 @ 00:26) (70 - 74)  BP: 119/52 (11-18-20 @ 00:26) (119/52 - 131/63)  RR: 21 (11-18-20 @ 00:26) (20 - 21)  SpO2: 95% (11-18-20 @ 00:26) (88% - 95%)  Wt(kg): --    P/E: As above MAR    A/P:    Shortness of Breath most probably due to Acute CHF Exacerbation:  -Pro-BNP= 2012 (baseline= 2050 on 8/13/2020)  -Troponin= <0.015  -TTE (1/31/2020) identified a severely dilated left atrium.  Mild concentric left ventricular hypertrophy.  LVEF = 55 to 60% with No regional wall motion abnormalities.  Grade I diastolic dysfunction (Impaired relaxation).  Though unable to estimate RVSP.  -Chest film, although portable, demonstrates appreciably enlarged heart size with a straightened left heart border, with diffuse patchy densities of the bilateral lower lung fields.  -COVID still pending in ED  -SIRS Criteria= 1 (RR>20) + no certain source of infection (?lungs)  -qSOFA= 0, Not High Risk  -CURB-65= 1 point, Low risk group: 2.7% 30-day mortality.    -Patient received Rocephin and Azithromycin in ED  -Will send ESR, CRP, Lactic Acid, LDH and Procalcitonin  -TTE (1/31/2020) identified a severely dilated left atrium.  Mild concentric left ventricular hypertrophy.  LVEF = 55 to 60% with No regional wall motion abnormalities.  Grade I diastolic dysfunction (Impaired relaxation).  Though unable to estimate RVSP.  -Will admit patient to Telemetry for Continuous Cardiac Monitoring  -Will continue to trend Troponin with simultaneous acquisition of EKG  -TTE (with attention to mPAP)  -Strict I&Os, Daily Weights  -Fluid Restrictions <2000 daily  -Will continue patient's Metoprolol  -As patient's Blood Pressure and HR are currently stable, will start patient on low-dose Lasix 20mg daily, for now  -Rehab Consult  -Speech and Swallow eval  -Will need to ask Cardiology to evaluate patient for additional recommendations    HTN:  -As patient was initially hypotensive, will resume patient's Metoprolol, though will hold additional antihypertensives, for now  -Vital Signs Q2H    Chronic Renal Insufficiency:  -CT Abdomen/Pelvis with IV contrast (10/3/2020) identified  right kidney demonstrates 2 heterogeneous lesions, measuring 2 cm in the upper pole and 4.8 cm in the lower pole concerning for renal cell carcinoma. Recommend further evaluation with renal mass protocol MRI or CT. No evidence of neovascularity or renal vein thrombosis.  -eGFR= 52 (baseline= 36 on 10/3/2020)  -Will send Urinalysis and Urinary Electrolytes (Sodium, Potassium, Creatinine, Chloride)  -Will hold IVF hydration, for now  -May need to consider obtaining Bilateral Renal Sonogram    DM:  -Hemoglobin A1c= 9.7% on 3/11/2020  -Hemoglobin A1c with AM labs  -Blood Glucose Monitoring ACHS  -Regular Insulin Sliding Scale ACHS  -NPO (except meds), for now until Speech and Swallow eval then can start Carb Controlled, Heart Healthy, Renal (Non-Dialysis) diet as tolerated    Hypokalemia:  -Will continue to replete and recheck and necessary    HLD:  -Lipid Panel with AM labs  -Will resume patient's home medications    Dementia:  -Will resume patient's Aspirin, Statin    Hypoalbuminemia:  -Nutrition Consult    GI/DVT PPx:  -Heparin  -Pepcid Patient is a 80 year old female with a PMH of HTN, HLD, DM, Vascular Dementia, h/o TIA in 3/2020, Paroxysmal Atrial Fibrillation who presented with a chief complaint of shortness of breath.  Patient states that beginning approximately 2 days prior to current presentation, she began to experience a rapidly progressive worsening of a non-productive cough along with shortness of breath.  Patient denies having experienced symptoms such as this before, for which she decided to come to [facility] ED for further evaluation.    In the ED, patient was seen sitting up in bed, intermittent wearing her supplemental oxygen, with difficulty speaking full sentences due to frequent coughing, tachypneic into high 20's low 30s when attempting to speak.  ICU was consulted, not a candidate at this time.    T(C): 36.3 (11-18-20 @ 00:26), Max: 36.8 (11-17-20 @ 22:48)  T(F): 97.4 (11-18-20 @ 00:26), Max: 98.3 (11-17-20 @ 22:48)  HR: 74 (11-18-20 @ 00:26) (70 - 74)  BP: 119/52 (11-18-20 @ 00:26) (119/52 - 131/63)  RR: 21 (11-18-20 @ 00:26) (20 - 21)  SpO2: 95% (11-18-20 @ 00:26) (88% - 95%)  Wt(kg): --    P/E: As above MAR    A/P:    Shortness of Breath most probably due to Acute CHF Exacerbation:  -Pro-BNP= 2012 (baseline= 2050 on 8/13/2020)  -Troponin= <0.015  -TTE (1/31/2020) identified a severely dilated left atrium.  Mild concentric left ventricular hypertrophy.  LVEF = 55 to 60% with No regional wall motion abnormalities.  Grade I diastolic dysfunction (Impaired relaxation).  Though unable to estimate RVSP.  -Chest film, although portable, demonstrates appreciably enlarged heart size with a straightened left heart border, with diffuse patchy densities of the bilateral lower lung fields.  -COVID still pending in ED  -SIRS Criteria= 1 (RR>20) + no certain source of infection (?lungs)  -qSOFA= 0, Not High Risk  -CURB-65= 1 point, Low risk group: 2.7% 30-day mortality.    -Patient received Rocephin and Azithromycin in ED  -Will send ESR, CRP, Lactic Acid, LDH and Procalcitonin  -TTE (1/31/2020) identified a severely dilated left atrium.  Mild concentric left ventricular hypertrophy.  LVEF = 55 to 60% with No regional wall motion abnormalities.  Grade I diastolic dysfunction (Impaired relaxation).  Though unable to estimate RVSP.  -Will admit patient to Telemetry for Continuous Cardiac Monitoring  -Will continue to trend Troponin with simultaneous acquisition of EKG  -TTE (with attention to mPAP)  -Strict I&Os, Daily Weights  -Fluid Restrictions <2000 daily  -Will continue patient's Metoprolol  -As patient's Blood Pressure and HR are currently stable, will start patient on low-dose Lasix 20mg daily, for now  -Rehab Consult  -Speech and Swallow eval  -Fall Precautions, Aspiration Precautions  -Will need to ask Cardiology to evaluate patient for additional recommendations    HTN:  -As patient was initially hypotensive, will resume patient's Metoprolol, though will hold additional antihypertensives, for now  -Vital Signs Q2H    Chronic Renal Insufficiency:  -CT Abdomen/Pelvis with IV contrast (10/3/2020) identified  right kidney demonstrates 2 heterogeneous lesions, measuring 2 cm in the upper pole and 4.8 cm in the lower pole concerning for renal cell carcinoma. Recommend further evaluation with renal mass protocol MRI or CT. No evidence of neovascularity or renal vein thrombosis.  -eGFR= 52 (baseline= 36 on 10/3/2020)  -Will send Urinalysis and Urinary Electrolytes (Sodium, Potassium, Creatinine, Chloride)  -Will hold IVF hydration, for now  -May need to consider obtaining Bilateral Renal Sonogram    DM:  -Hemoglobin A1c= 9.7% on 3/11/2020  -Hemoglobin A1c with AM labs  -Blood Glucose Monitoring ACHS  -Regular Insulin Sliding Scale ACHS  -NPO (except meds), for now until Speech and Swallow eval then can start Carb Controlled, Heart Healthy, Renal (Non-Dialysis) diet as tolerated    Hypokalemia:  -Will continue to replete and recheck and necessary    HLD:  -Lipid Panel with AM labs  -Will resume patient's home medications    Dementia:  -Will resume patient's Aspirin, Statin    Hypoalbuminemia:  -Nutrition Consult    GI/DVT PPx:  -Heparin  -Pepcid Patient is a 80 year old female with a PMH of HTN, HLD, DM, Vascular Dementia, h/o TIA in 3/2020, Paroxysmal Atrial Fibrillation who presented with a chief complaint of shortness of breath.  Patient states that beginning approximately 2 days prior to current presentation, she began to experience a rapidly progressive worsening of a non-productive cough along with shortness of breath.  Patient denies having experienced symptoms such as this before, for which she decided to come to [facility] ED for further evaluation.    In the ED, patient was seen sitting up in bed, intermittent wearing her supplemental oxygen, with difficulty speaking full sentences due to frequent coughing, tachypneic into high 20's low 30s when attempting to speak.  ICU was consulted, not a candidate at this time.    T(C): 36.3 (11-18-20 @ 00:26), Max: 36.8 (11-17-20 @ 22:48)  T(F): 97.4 (11-18-20 @ 00:26), Max: 98.3 (11-17-20 @ 22:48)  HR: 74 (11-18-20 @ 00:26) (70 - 74)  BP: 119/52 (11-18-20 @ 00:26) (119/52 - 131/63)  RR: 21 (11-18-20 @ 00:26) (20 - 21)  SpO2: 95% (11-18-20 @ 00:26) (88% - 95%)  Wt(kg): --    P/E: As above MAR    A/P:    Shortness of Breath most probably due to Acute CHF Exacerbation:  -Pro-BNP= 2012 (baseline= 2050 on 8/13/2020)  -Troponin= <0.015  -TTE (1/31/2020) identified a severely dilated left atrium.  Mild concentric left ventricular hypertrophy.  LVEF = 55 to 60% with No regional wall motion abnormalities.  Grade I diastolic dysfunction (Impaired relaxation).  Though unable to estimate RVSP.  -Chest film, although portable, demonstrates appreciably enlarged heart size with a straightened left heart border, with diffuse patchy densities of the bilateral lower lung fields.  -COVID still pending in ED  -SIRS Criteria= 1 (RR>20) + no certain source of infection (?lungs)  -qSOFA= 0, Not High Risk  -CURB-65= 1 point, Low risk group: 2.7% 30-day mortality.    -Patient received Rocephin and Azithromycin in ED  -Will send ESR, CRP, Lactic Acid, LDH and Procalcitonin  -TTE (1/31/2020) identified a severely dilated left atrium.  Mild concentric left ventricular hypertrophy.  LVEF = 55 to 60% with No regional wall motion abnormalities.  Grade I diastolic dysfunction (Impaired relaxation).  Though unable to estimate RVSP.  -Will admit patient to Telemetry for Continuous Cardiac Monitoring  -Will continue to trend Troponin with simultaneous acquisition of EKG  -TTE (with attention to mPAP)  -Strict I&Os, Daily Weights  -Fluid Restrictions <2000 daily  -Will continue patient's Metoprolol  -As patient's Blood Pressure and HR are currently stable, will start patient on low-dose Lasix 20mg daily, for now  -Rehab Consult  -Speech and Swallow eval  -Fall Precautions, Aspiration Precautions  -Will need to ask Cardiology to evaluate patient for additional recommendations    HTN:  -As patient was initially hypotensive, will resume patient's Metoprolol, though will hold additional antihypertensives, for now  -Vital Signs Q2H    Chronic Renal Insufficiency:  -CT Abdomen/Pelvis with IV contrast (10/3/2020) identified  right kidney demonstrates 2 heterogeneous lesions, measuring 2 cm in the upper pole and 4.8 cm in the lower pole concerning for renal cell carcinoma. Recommend further evaluation with renal mass protocol MRI or CT. No evidence of neovascularity or renal vein thrombosis.  -eGFR= 52 (baseline= 36 on 10/3/2020)  -Will send Urinalysis and Urinary Electrolytes (Sodium, Potassium, Creatinine, Chloride)  -Will hold IVF hydration, for now  -May need to consider obtaining Bilateral Renal Sonogram    DM:  -Hemoglobin A1c= 9.7% on 3/11/2020  -Hemoglobin A1c with AM labs  -Blood Glucose Monitoring ACHS  -Regular Insulin Sliding Scale ACHS  -NPO (except meds), for now until Speech and Swallow eval then can start Carb Controlled, Heart Healthy, Renal (Non-Dialysis) diet as tolerated    Hypokalemia:  -Will continue to replete and recheck and necessary    HLD:  -Lipid Panel with AM labs  -Will resume patient's home medications    Vascular Dementia:  -Will resume patient's Aspirin, Statin, Eliquis    Hypoalbuminemia:  -Nutrition Consult    GI/DVT PPx:  -Eliquis  -Pepcid

## 2020-11-18 NOTE — H&P ADULT - PROBLEM SELECTOR PLAN 3
Pt p/w decreased o2 sat in 80s  placed on NC SAT O2 ABOVE 95%  treatment of underlying CHF vs pna as above

## 2020-11-18 NOTE — H&P ADULT - PROBLEM SELECTOR PLAN 6
Hemoglobin A1c= 9.7% on 3/11/2020  -f/u Hemoglobin A1c with AM labs  -Blood Glucose Monitoring ACHS  -Sliding Scale ACHS  -NPO (except meds), for now until Speech and Swallow eval then can start Carb Controlled, Heart Healthy, Renal (Non-Dialysis) diet as tolerated

## 2020-11-18 NOTE — PROGRESS NOTE ADULT - ASSESSMENT
80 year old female from home ambulates with walker has HHA with a PMH of kidney ca right side, HTN, HLD, DM, Vascular Dementia, h/o TIA in 3/2020, Paroxysmal Atrial Fibrillation, chronic leg swelling (on Lasix presented to ED for low O2 saturation. Admitted to medicine for pneumonia.

## 2020-11-19 NOTE — PROGRESS NOTE ADULT - SUBJECTIVE AND OBJECTIVE BOX
NP Note discussed with  primary attending    Patient is a 80y old  Female who presents with a chief complaint of Acute hypoxic respiratory failure (18 Nov 2020 15:24)      INTERVAL HPI/OVERNIGHT EVENTS: no new complaints    MEDICATIONS  (STANDING):  apixaban 5 milliGRAM(s) Oral two times a day  aspirin  chewable 81 milliGRAM(s) Oral daily  atorvastatin 40 milliGRAM(s) Oral at bedtime  azithromycin  IVPB 500 milliGRAM(s) IV Intermittent every 24 hours  benzocaine 15 mG/menthol 3.6 mG (Sugar-Free) Lozenge 1 Lozenge Oral every 4 hours  cefTRIAXone   IVPB 1000 milliGRAM(s) IV Intermittent every 24 hours  dextrose 40% Gel 15 Gram(s) Oral once  dextrose 5%. 1000 milliLiter(s) (50 mL/Hr) IV Continuous <Continuous>  dextrose 5%. 1000 milliLiter(s) (100 mL/Hr) IV Continuous <Continuous>  dextrose 50% Injectable 25 Gram(s) IV Push once  dextrose 50% Injectable 12.5 Gram(s) IV Push once  dextrose 50% Injectable 25 Gram(s) IV Push once  donepezil 10 milliGRAM(s) Oral at bedtime  furosemide   Injectable 20 milliGRAM(s) IV Push daily  glucagon  Injectable 1 milliGRAM(s) IntraMuscular once  insulin lispro (ADMELOG) corrective regimen sliding scale   SubCutaneous three times a day before meals  insulin lispro Injectable (ADMELOG) 3 Unit(s) SubCutaneous three times a day before meals  melatonin 5 milliGRAM(s) Oral once  metoprolol tartrate 12.5 milliGRAM(s) Oral two times a day    MEDICATIONS  (PRN):  guaiFENesin   Syrup  (Sugar-Free) 100 milliGRAM(s) Oral every 6 hours PRN Cough      __________________________________________________  REVIEW OF SYSTEMS:    CONSTITUTIONAL: No fever,   RESPIRATORY: + dry  cough; No shortness of breath  CARDIOVASCULAR: No chest pain, no palpitations  GASTROINTESTINAL: No pain. No nausea or vomiting; No diarrhea   NEUROLOGICAL: No headache or numbness, no tremors  MUSCULOSKELETAL: No joint pain, no muscle pain  GENITOURINARY: no dysuria,   PSYCHIATRY: + hx of Dementia       Vital Signs Last 24 Hrs  T(C): 36.6 (19 Nov 2020 05:26), Max: 36.7 (18 Nov 2020 19:56)  T(F): 97.8 (19 Nov 2020 05:26), Max: 98.1 (18 Nov 2020 19:56)  HR: 59 (19 Nov 2020 05:26) (56 - 65)  BP: 123/51 (19 Nov 2020 05:26) (108/50 - 123/79)  BP(mean): --  RR: 18 (19 Nov 2020 05:26) (18 - 19)  SpO2: 96% (19 Nov 2020 05:26) (93% - 96%)    ________________________________________________  PHYSICAL EXAM:  GENERAL: NAD  CHEST/LUNG: + fine crackles  to ausculitation bilaterally   HEART: S1 S2  regular; no murmurs, gallops or rubs  ABDOMEN: Soft, Nontender, Nondistended; Bowel sounds present  EXTREMITIES: no cyanosis; no edema; no calf tenderness  SKIN: warm and dry; no rash  NERVOUS SYSTEM:  Awake and alert; Oriented  to place, person and disoriented to  time ; no new deficits    _________________________________________________  LABS:                        13.2   11.54 )-----------( 226      ( 19 Nov 2020 07:04 )             42.0     11-19    139  |  101  |  x   ----------------------------<  x   4.6   |  x   |  1.04    Ca    8.7      18 Nov 2020 07:43  Phos  3.3     11-19  Mg     1.9     11-18    TPro  7.5  /  Alb  3.3<L>  /  TBili  0.8  /  DBili  x   /  AST  12  /  ALT  15  /  AlkPhos  83  11-18    PT/INR - ( 17 Nov 2020 22:49 )   PT: 12.8 sec;   INR: 1.08 ratio         PTT - ( 17 Nov 2020 22:49 )  PTT:38.2 sec    CAPILLARY BLOOD GLUCOSE      POCT Blood Glucose.: 287 mg/dL (19 Nov 2020 11:47)  POCT Blood Glucose.: 290 mg/dL (19 Nov 2020 08:19)  POCT Blood Glucose.: 362 mg/dL (18 Nov 2020 21:12)  POCT Blood Glucose.: 338 mg/dL (18 Nov 2020 16:37)  POCT Blood Glucose.: 350 mg/dL (18 Nov 2020 13:06)        RADIOLOGY & ADDITIONAL TESTS:    Imaging Personally Reviewed:  YES/NO    Consultant(s) Notes Reviewed:   YES/ No    Care Discussed with Consultants :     Plan of care was discussed with patient and /or primary care giver; all questions and concerns were addressed and care was aligned with patient's wishes.

## 2020-11-19 NOTE — PROGRESS NOTE ADULT - PROBLEM SELECTOR PLAN 4
recently diagnosed with renal cancer and has plans for surgery in [place]   CT Abdomen/Pelvis with IV contrast (10/3/2020) identified  right kidney demonstrates 2 heterogeneous lesions, measuring 2 cm in the upper pole and 4.8 cm in the lower pole concerning for renal cell carcinoma.  f/u  Urinalysis and Urinary Electrolytes (Sodium, Potassium, Creatinine, Chloride)  outpatient follow up

## 2020-11-19 NOTE — PROGRESS NOTE ADULT - PROBLEM SELECTOR PLAN 1
continue lasix 20 mg daily   continue Metoprolol 12.5mg PO BID  cardiology Dr Duque on board   f/u repeat TTE   Fluid Restrictions <2000 daily  Strict I&O   Daily Weights

## 2020-11-20 NOTE — PROGRESS NOTE ADULT - SUBJECTIVE AND OBJECTIVE BOX
NP Note discussed with  primary attending    Patient is a 80y old  Female who presents with a chief complaint of Acute hypoxic respiratory failure (19 Nov 2020 12:17)      INTERVAL HPI/OVERNIGHT EVENTS: no new complaints    MEDICATIONS  (STANDING):  amLODIPine   Tablet 5 milliGRAM(s) Oral daily  apixaban 5 milliGRAM(s) Oral two times a day  aspirin  chewable 81 milliGRAM(s) Oral daily  atorvastatin 40 milliGRAM(s) Oral at bedtime  azithromycin  IVPB 500 milliGRAM(s) IV Intermittent every 24 hours  benzocaine 15 mG/menthol 3.6 mG (Sugar-Free) Lozenge 1 Lozenge Oral every 4 hours  cefTRIAXone   IVPB 1000 milliGRAM(s) IV Intermittent every 24 hours  dextrose 40% Gel 15 Gram(s) Oral once  dextrose 5%. 1000 milliLiter(s) (50 mL/Hr) IV Continuous <Continuous>  dextrose 5%. 1000 milliLiter(s) (100 mL/Hr) IV Continuous <Continuous>  dextrose 50% Injectable 25 Gram(s) IV Push once  dextrose 50% Injectable 12.5 Gram(s) IV Push once  dextrose 50% Injectable 25 Gram(s) IV Push once  donepezil 10 milliGRAM(s) Oral at bedtime  furosemide    Tablet 40 milliGRAM(s) Oral daily  glucagon  Injectable 1 milliGRAM(s) IntraMuscular once  insulin glargine Injectable (LANTUS) 5 Unit(s) SubCutaneous at bedtime  insulin lispro (ADMELOG) corrective regimen sliding scale   SubCutaneous three times a day before meals  insulin lispro Injectable (ADMELOG) 3 Unit(s) SubCutaneous three times a day before meals  losartan 50 milliGRAM(s) Oral daily  melatonin 5 milliGRAM(s) Oral once  metoprolol tartrate 12.5 milliGRAM(s) Oral two times a day  potassium chloride   Powder 40 milliEquivalent(s) Oral once    MEDICATIONS  (PRN):  guaiFENesin   Syrup  (Sugar-Free) 100 milliGRAM(s) Oral every 6 hours PRN Cough      __________________________________________________  REVIEW OF SYSTEMS:    CONSTITUTIONAL: No fever,   EYES: no acute visual disturbances  NECK: No pain or stiffness  RESPIRATORY: +  cough; No shortness of breath  CARDIOVASCULAR: No chest pain, no palpitations  GASTROINTESTINAL: No pain. No nausea or vomiting; No diarrhea   NEUROLOGICAL: No headache or numbness, no tremors  MUSCULOSKELETAL: No joint pain, no muscle pain  GENITOURINARY: no dysuria, no frequency, no hesitancy        Vital Signs Last 24 Hrs  T(C): 36.3 (20 Nov 2020 12:30), Max: 36.7 (19 Nov 2020 21:04)  T(F): 97.4 (20 Nov 2020 12:30), Max: 98 (19 Nov 2020 21:04)  HR: 82 (20 Nov 2020 12:30) (81 - 91)  BP: 129/86 (20 Nov 2020 12:30) (111/68 - 138/74)  BP(mean): --  RR: 16 (20 Nov 2020 12:30) (16 - 18)  SpO2: 93% (20 Nov 2020 12:30) (90% - 95%)    ________________________________________________  PHYSICAL EXAM:  GENERAL: NAD, obese   CHEST/LUNG: decreased breath sounds gaurav   HEART: S1 S2  irregular;   ABDOMEN: Soft, Nontender, Nondistended; Bowel sounds present  EXTREMITIES: + 3 LE  edema; no calf tenderness  SKIN: warm and dry; no rash  NERVOUS SYSTEM:  Awake and alert; Oriented  to place, person and disoriented to  time ; no new deficits    _________________________________________________  LABS:                        13.7   13.83 )-----------( 258      ( 20 Nov 2020 12:41 )             43.7     11-20    141  |  101  |  15  ----------------------------<  265<H>  3.4<L>   |  34<H>  |  1.06    Ca    8.8      20 Nov 2020 12:41  Phos  3.3     11-19  Mg     2.3     11-19          CAPILLARY BLOOD GLUCOSE      POCT Blood Glucose.: 307 mg/dL (20 Nov 2020 11:26)  POCT Blood Glucose.: 180 mg/dL (20 Nov 2020 07:48)  POCT Blood Glucose.: 196 mg/dL (19 Nov 2020 21:25)  POCT Blood Glucose.: 196 mg/dL (19 Nov 2020 17:12)        RADIOLOGY & ADDITIONAL TESTS:    Imaging Personally Reviewed:  YES/NO    Consultant(s) Notes Reviewed:   YES/ No    Care Discussed with Consultants :     Plan of care was discussed with patient and /or primary care giver; all questions and concerns were addressed and care was aligned with patient's wishes.     NP Note discussed with  primary attending    Patient is a 80y old  Female who presents with a chief complaint of Acute hypoxic respiratory failure (19 Nov 2020 12:17)      INTERVAL HPI/OVERNIGHT EVENTS: no new complaints    MEDICATIONS  (STANDING):  amLODIPine   Tablet 5 milliGRAM(s) Oral daily  apixaban 5 milliGRAM(s) Oral two times a day  aspirin  chewable 81 milliGRAM(s) Oral daily  atorvastatin 40 milliGRAM(s) Oral at bedtime  azithromycin  IVPB 500 milliGRAM(s) IV Intermittent every 24 hours  benzocaine 15 mG/menthol 3.6 mG (Sugar-Free) Lozenge 1 Lozenge Oral every 4 hours  cefTRIAXone   IVPB 1000 milliGRAM(s) IV Intermittent every 24 hours  dextrose 40% Gel 15 Gram(s) Oral once  dextrose 5%. 1000 milliLiter(s) (50 mL/Hr) IV Continuous <Continuous>  dextrose 5%. 1000 milliLiter(s) (100 mL/Hr) IV Continuous <Continuous>  dextrose 50% Injectable 25 Gram(s) IV Push once  dextrose 50% Injectable 12.5 Gram(s) IV Push once  dextrose 50% Injectable 25 Gram(s) IV Push once  donepezil 10 milliGRAM(s) Oral at bedtime  furosemide    Tablet 40 milliGRAM(s) Oral daily  glucagon  Injectable 1 milliGRAM(s) IntraMuscular once  insulin glargine Injectable (LANTUS) 5 Unit(s) SubCutaneous at bedtime  insulin lispro (ADMELOG) corrective regimen sliding scale   SubCutaneous three times a day before meals  insulin lispro Injectable (ADMELOG) 3 Unit(s) SubCutaneous three times a day before meals  losartan 50 milliGRAM(s) Oral daily  melatonin 5 milliGRAM(s) Oral once  metoprolol tartrate 12.5 milliGRAM(s) Oral two times a day  potassium chloride   Powder 40 milliEquivalent(s) Oral once    MEDICATIONS  (PRN):  guaiFENesin   Syrup  (Sugar-Free) 100 milliGRAM(s) Oral every 6 hours PRN Cough      __________________________________________________  REVIEW OF SYSTEMS:    CONSTITUTIONAL: No fever,   EYES: no acute visual disturbances  NECK: No pain or stiffness  RESPIRATORY: +  cough; No shortness of breath  CARDIOVASCULAR: No chest pain, no palpitations  GASTROINTESTINAL: No pain. No nausea or vomiting; No diarrhea   NEUROLOGICAL: No headache or numbness, no tremors  MUSCULOSKELETAL: No joint pain, no muscle pain  GENITOURINARY: no dysuria, no frequency, no hesitancy        Vital Signs Last 24 Hrs  T(C): 36.3 (20 Nov 2020 12:30), Max: 36.7 (19 Nov 2020 21:04)  T(F): 97.4 (20 Nov 2020 12:30), Max: 98 (19 Nov 2020 21:04)  HR: 82 (20 Nov 2020 12:30) (81 - 91)  BP: 129/86 (20 Nov 2020 12:30) (111/68 - 138/74)  BP(mean): --  RR: 16 (20 Nov 2020 12:30) (16 - 18)  SpO2: 93% (20 Nov 2020 12:30) (90% - 95%)    ________________________________________________  PHYSICAL EXAM:  GENERAL: NAD, obese   CHEST/LUNG: decreased breath sounds gaurav   HEART: S1 S2  irregular;   ABDOMEN: Soft, Nontender, Nondistended; Bowel sounds present  EXTREMITIES: + 3 LE  edema; no calf tenderness; 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  SKIN: warm and dry; no rash  NERVOUS SYSTEM:  Awake and alert; Oriented  to place, person and disoriented to  time ; no new deficits    _________________________________________________  LABS:                        13.7   13.83 )-----------( 258      ( 20 Nov 2020 12:41 )             43.7     11-20    141  |  101  |  15  ----------------------------<  265<H>  3.4<L>   |  34<H>  |  1.06    Ca    8.8      20 Nov 2020 12:41  Phos  3.3     11-19  Mg     2.3     11-19          CAPILLARY BLOOD GLUCOSE      POCT Blood Glucose.: 307 mg/dL (20 Nov 2020 11:26)  POCT Blood Glucose.: 180 mg/dL (20 Nov 2020 07:48)  POCT Blood Glucose.: 196 mg/dL (19 Nov 2020 21:25)  POCT Blood Glucose.: 196 mg/dL (19 Nov 2020 17:12)        RADIOLOGY & ADDITIONAL TESTS:    Imaging Personally Reviewed:  YES/NO    Consultant(s) Notes Reviewed:   YES/ No    Care Discussed with Consultants :     Plan of care was discussed with patient and /or primary care giver; all questions and concerns were addressed and care was aligned with patient's wishes.

## 2020-11-20 NOTE — DISCHARGE NOTE PROVIDER - HOSPITAL COURSE
81 yo F with HTN, HLD, DM, vascular dementia, and paroxysmal AF on Eliquis who presented with dyspnea and dry cough. Pt found to have   Patchy infiltrate of the right lung base on CXR,  Troponin  x 2 negative, mildly elevated Pro BNP 2012,  Admitted for acute on chronic diastolic HF exacerbation with possible pneumonia, treated with  Ceftriaxone and Azithromycin.  Blood culture negative   Received lasix 40 mg IV and oral lasix 40 mg was continued   transthoracic echocardiogram: showed Ejection Fraction : >55 %  Mitral annular calcification. Mild mitral regurgitation. Normal left atrium, grossly  normal left ventricular systolic function. Patient was evaluated by pulmonology and cardiology and recommendations were followed   Chest xray repated:    81 yo F with HTN, HLD, DM, vascular dementia, and paroxysmal AF on Eliquis who presented with dyspnea and dry cough. Pt found to have   Patchy infiltrate of the right lung base on CXR,  Troponin  x 2 negative, mildly elevated Pro BNP 2012,  Admitted for acute on chronic diastolic HF exacerbation with possible pneumonia, treated with  Ceftriaxone and Azithromycin.  Blood culture negative   Received lasix 40 mg IV and oral lasix 40 mg was continued   transthoracic echocardiogram: showed Ejection Fraction : >55 %  Mitral annular calcification. Mild mitral regurgitation. Normal left atrium, grossly  normal left ventricular systolic function. Patient was evaluated by pulmonology and cardiology and recommendations were followed   Chest CT repeated, Rt small pleural effusion, possible pneumonia, continued on lasix and zithromax/ ceftriaxone treatment. PT unable evaluate pt due to  non compliance with evaluation process, discussed with daughter, xray of lt knee was performed -- xx reattempt PT eval ---xx and family wants to take her back home.     incomplete 11/24 v     81 yo F with HTN, HLD, DM, vascular dementia, and paroxysmal AF on Eliquis who presented with dyspnea and dry cough. Pt found to have patchy infiltrate of the right lung base on CXR, mildly elevated Pro BNP 2012,Admitted for acute on chronic diastolic HF exacerbation with possible pneumonia, treated with  Ceftriaxone and Azithromycin.  Blood culture negative. Pt was given lasix  IV and  transitioned to oral lasix. Transthoracic echocardiogram: showed Ejection Fraction : >55 %. Mitral annular calcification. Mild mitral regurgitation. Normal left atrium, grossly, normal left ventricular systolic function. Pt was given IV ceftriaxone and azithromycin for PNA. Patient was evaluated by pulmonology and cardiology. PT recommended JESE but pt has been nonadherent with medication and PT evaluation. Daughter wants to take patient home upon discharge.  Pt is medically optimized and clinically improved for discharge as out-patient follow up.  Please note that this a brief summary of hospital course please refer to daily progress notes and consult notes for full course and events  ' 79 yo F with HTN, HLD, DM, vascular dementia, and paroxysmal AF on Eliquis who presented with dyspnea and dry cough. Pt found to have patchy infiltrate of the right lung base on CXR, mildly elevated Pro BNP 2012,Admitted for acute on chronic diastolic HF exacerbation with possible pneumonia, treated with  Ceftriaxone and Azithromycin.  Blood culture negative. Pt was given lasix  IV and  transitioned to oral lasix. Transthoracic echocardiogram: showed Ejection Fraction : >55 %. Mitral annular calcification. Mild mitral regurgitation. Normal left atrium, grossly, normal left ventricular systolic function. Pt was given IV ceftriaxone and azithromycin for PNA. Patient was evaluated by pulmonology and cardiology. . Daughter wants to take patient home upon discharge.  Pt is medically optimized and clinically improved for discharge as out-patient follow up.  Please note that this a brief summary of hospital course please refer to daily progress notes and consult notes for full course and events  '

## 2020-11-20 NOTE — PROGRESS NOTE ADULT - ATTENDING COMMENTS
Patient seen and examined.  Case discussed with NP.  Agree with above as edited.   Patient is a 80yoF with recently diagnosed renal cancer with plans for nephrectomy, DM II, Vascular dementia with h/o TIA in March, 2020, CKDIII, HTN, HLD, paroxysmal Afib, CHFpEF a/w acute on chronic CHFpEF and PNA.   Acute on chronic CHFpEF 2/2 moderate pulmonary hypertension. d/w Cardiology. On IV lasix. plan to change to 40mg oral daily starting tomorrow.  PNA - c/w IV ceftriaxone and azithro. TTE. Monitor Ow sats.   Afib - c/w metoprolol for rate control and apixaban for AC  h/o TIA - c/w statin and ASA  HTN - Currently holding losartan and amlodipine for now. c/w metoprolol  DM - holding oral hypoglycemics. ISS and monitor FSs  Renal cancer - planned nephrectomy is scheduled outpatient.
Patient seen and examined.  Case discussed with NP.  Agree with above as edited.   Patient is a 80yoF with recently diagnosed renal cancer with plans for nephrectomy, DM II, Vascular dementia with h/o TIA in March, 2020, CKDIII, HTN, HLD, paroxysmal Afib, CHFpEF a/w acute on chronic CHFpEF and PNA.   Acute on chronic CHFpEF 2/2 moderate pulmonary hypertension. Now on PO lasix. Symptomatically improving. Still with LE edema. c/w oral lasix as ordered.   PNA - c/w IV ceftriaxone and azithro, day 4. CXR reviewed. R infiltrate looks worse but patient is symptomatically improved. Will f/u pulm recs. Continue to monitor O2 sats. Repeat lactate  Afib - c/w metoprolol for rate control and apixaban for AC  Prior h/o TIA - c/w statin and ASA as ordered  HTN - Currently holding losartan and amlodipine for now. BP remains acceptable. c/w metoprolol  DM - holding oral hypoglycemics. ISS and monitor FSs. Start lantus 5u QHS tonight.  Renal cancer - planned nephrectomy is scheduled outpatient.  Team has updated daughter.
Patient seen and examined.  Case discussed with house staff.  Agree with above as edited.   Patient is a 80yoF with recently diagnosed renal cancer with plans for nephrectomy, DM II, Vascular dementia with h/o TIA in March, 2020, CKDIII, HTN, HLD, paroxysmal Afib, CHFpEF a/w acute on chronic CHFpEF and PNA. d/w Cardiology and pulmonary. c/w diuresis. c/w IV ceftriaxone and azithro. TTE. Monitor Ow sats.   Afib - c/w metoprolol for rate control and apixaban for AC  h/o TIA - c/w statin and ASA  HTN - holding losartan and amlodipine for now. c/w metoprolol  DM - holding oral hypoglycemics. ISS and monitor FSs  Renal cancer - planned nephrectomy is scheduled outpatient. Team to alert  that patient currently hospitalized.

## 2020-11-20 NOTE — DISCHARGE NOTE PROVIDER - ATTENDING COMMENTS
Seen and examined this morning. plan of care discussed with NP and Dr. Sweeney.     A/P  Acute hypoxic and hypercapnic respiratory failure secondary to b/l  Pneumonia and acute on chronic heart failure  BL Pneumonia community acquires s/p completion of antibiotics for total of 7 days.   Acute on chronic diastolic heart failure -is euvolemic  Pulmonary HTN  Metabolic alkalosis   Afib- on Eliquis, rate controlled  DM  HTN  HLD  Vascular Dementia    Medically stable for discharge     Seen and examined this morning. plan of care discussed with NP and Dr. Sweeney.     A/P  Acute hypoxic and hypercapnic respiratory failure secondary to b/l  Pneumonia and acute on chronic heart failure  BL Pneumonia community acquired s/p completion of antibiotics for total of 7 days.   Acute on chronic diastolic heart failure -is euvolemic  Pulmonary HTN  Metabolic alkalosis   Afib- on Eliquis, rate controlled  DM  HTN  HLD  Vascular Dementia    Medically stable for discharge  It was explained to the patients daughter that PT recommendation is for JESE, as  pt was able to take few steps with the walker. Daughter understands however wants to take the patient home with limited HHA hours. Face to face signed  Advised follow up with her PCP

## 2020-11-20 NOTE — PROGRESS NOTE ADULT - ASSESSMENT
79 yo F with HTN, HLD, DM, vascular dementia, and paroxysmal AF on Eliquis who presented with dyspnea and dry cough. Pt found to have   Patchy infiltrate of the right lung base on CXR,  Troponin  x 2 negative, mildly elevated Pro BNP 2012,   Admitted for acute on chronic diastolic HF exacerbation and  pneumonia. Received lasix 40 mg IV and started on Ceftriaxone and Azithromycin. lasix 40 mg po continued. Patient seen at bedside, alert and awake, disoriented to time, states feels better and wants to go home. Labs + hypokalemia 3.4, lactate 2.9, WBC 13, blood sugar elevated   Discharge planning in 1-2 days

## 2020-11-20 NOTE — PROGRESS NOTE ADULT - PROBLEM SELECTOR PLAN 1
continue lasix 40 mg daily   fu repeat chest xray  Dr Khanna pulmonary on board   continue Metoprolol 12.5mg PO BID  cardiology Dr Duque on board   repeat TTE noted   Fluid Restrictions <2000 daily  Strict I&O   Daily Weights

## 2020-11-20 NOTE — DISCHARGE NOTE PROVIDER - NSDCMRMEDTOKEN_GEN_ALL_CORE_FT
AMLODIPINE BESYLATE  10 MG TABS: orally once a day  aspirin 81 mg oral tablet, chewable: 1 tab(s) orally once a day  atorvastatin 40 mg oral tablet: 1 tab(s) orally once a day (at bedtime)  DONEPEZIL HCL  10 MG TABS: orally once a day  Drisdol 50,000 intl units (1.25 mg) oral capsule: 1 cap(s) orally once a week  ELIQUIS  2.5 MG TABS: orally 2 times a day  FUROSEMIDE  20 MG TABS: orally 3 times a day  JANUVIA 50 MG TABLET: take 1 tablet by mouth once daily  losartan 25 mg oral tablet: 1 tab(s) orally 2 times a day  LOSARTAN POTASSIUM  100 MG TABS: orally once a day  METOPROLOL TARTRATE 50 MG TAB: TAKE 1 TABLET BY MOUTH TWICE A DAY  PIOGLITAZONE HCL 30 MG TABLET: take 1 tablet by mouth once daily  POTASSIUM CL ER 10 MEQ TABLET: take 1 tablet by mouth every other day   acetaminophen 325 mg oral tablet: 2 tab(s) orally every 6 hours, As needed, Mild Pain (1 - 3), Moderate Pain (4 - 6)  acetaZOLAMIDE 250 mg oral tablet: 1 tab(s) orally 2 times a day  amLODIPine 5 mg oral tablet: 1 tab(s) orally once a day  apixaban 5 mg oral tablet: 1 tab(s) orally 2 times a day  aspirin 81 mg oral tablet, chewable: 1 tab(s) orally once a day  atorvastatin 40 mg oral tablet: 1 tab(s) orally once a day (at bedtime)  donepezil 10 mg oral tablet: 1 tab(s) orally once a day (at bedtime)  Drisdol 50,000 intl units (1.25 mg) oral capsule: 1 cap(s) orally once a week  furosemide 40 mg oral tablet: 1 tab(s) orally once a day  guaiFENesin 100 mg/5 mL oral liquid: 5 milliliter(s) orally every 6 hours, As needed, Cough  JANUVIA 50 MG TABLET: take 1 tablet by mouth once daily  losartan 50 mg oral tablet: 1 tab(s) orally once a day  metoprolol tartrate 25 mg oral tablet: 12.5 milligram(s) orally 2 times a day   PIOGLITAZONE HCL 30 MG TABLET: take 1 tablet by mouth once daily  POTASSIUM CL ER 10 MEQ TABLET: take 1 tablet by mouth every other day

## 2020-11-20 NOTE — DISCHARGE NOTE PROVIDER - NSDCCPCAREPLAN_GEN_ALL_CORE_FT
PRINCIPAL DISCHARGE DIAGNOSIS  Diagnosis: Pneumonia  Assessment and Plan of Treatment: Pneumonia is a lung infection that can cause a fever, cough, and trouble breathing. You completed the course of antibiotic.   Nutrition is important, eat small frequent meals.  Get lots of rest and drink fluids.  Call your health care provider upon arrival home from hospital and make a follow up appointment for one week.  If your cough worsens, you develop fever greater than 101', you have shaking chills, a fast heartbeat, trouble breathing and/or feel your are breathing much faster than usual, call your healthcare provider.  Make sure you wash your hands frequently.      SECONDARY DISCHARGE DIAGNOSES  Diagnosis: Atrial fibrillation  Assessment and Plan of Treatment: Atrial fibrillation is the most common heart rhythm problem.  The condition puts you at risk for has stroke and heart attack  Call your doctor if you feel your heart racing or beating unusually, chest tightness or pain, lightheaded, faint, shortness of breath especially with exercise  It is important to take your heart medication as prescribed  You are on anticoagulation which is very important to take as directed - you may need blood work to monitor drug levels    Diagnosis: CHF (congestive heart failure)  Assessment and Plan of Treatment: Continue all your medications as prescribed   Weigh yourself daily.  If you gain 3lbs in 3 days, or 5lbs in a week call your Health Care Provider.  Do not eat or drink foods containing more than 2000mg of salt (sodium) in your diet every day.  Call your Health Care Provider if you have any swelling or increased swelling in your feet, ankles, and/or stomach.  Take all of your medication as directed.  If you become dizzy call your Health Care Provider.    Diagnosis: HTN (hypertension)  Assessment and Plan of Treatment: Continue all your medications as prescribed. Recommend the DASH Diet. This diet emphasizes vegetables, fruits, and fat-free or low-fat dairy products.  Includes whole grains, fish, poultry, beans, seeds, nuts, and vegetable oils. Please limit sodium, sweets, sugary beverages, and red meats.    Diagnosis: DM (diabetes mellitus)  Assessment and Plan of Treatment: Make sure you get your HgA1c checked every three months.   check your blood glucose two times a day.  It's important not to skip any meals.  Keep a log of your blood glucose results and always take it with you to your doctor appointments.  Keep a list of your current medications including injectables and over the counter medications and bring this medication list with you to all your doctor appointments.  If you have not seen your ophthalmologist this year call for appointment.  Check your feet daily for redness, sores, or openings. Do not self treat. If no improvement in two days call your primary care physician for an appointment.  Low blood sugar (hypoglycemia) is a blood sugar below 70mg/dl. Check your blood sugar if you feel signs/symptoms of hypoglycemia. If your blood sugar is below 70 take 15 grams of carbohydrates (ex 4 oz of apple juice, 3-4 glucose tablets, or 4-6 oz of regular soda) wait 15 minutes and repeat blood sugar to make sure it comes up above 70.  If your blood sugar is above 70 and you are due for a meal, have a meal.  If you are not due for a meal have a snack.  This snack helps keeps your blood sugar at a safe range.     PRINCIPAL DISCHARGE DIAGNOSIS  Diagnosis: Acute respiratory failure with hypoxia  Assessment and Plan of Treatment: You presented with shortness of breath, likey due to chf axacerbation and PNA. You were given lasix IV to help you with your breathing and antibiotics for your PNA. Your breathing has gotten better and clinically impoved with these medications. You will take Diomox, another type of water pill for two more days as recommeded by pulmonologist. You were seen by cardiologist and pulmonologist while you were here to manage your care.   Please follow up with your PCP in 1 week to manage your medication   Please seek medical attention if you develop  increased shortness of breath, fever, chest pain, and increase in weight.      SECONDARY DISCHARGE DIAGNOSES  Diagnosis: Pneumonia  Assessment and Plan of Treatment: Pneumonia is a lung infection that can cause a fever, cough, and trouble breathing. You completed the course of antibiotic.   Nutrition is important, eat small frequent meals.  Get lots of rest and drink fluids.  Call your health care provider upon arrival home from hospital and make a follow up appointment for one week.  If your cough worsens, you develop fever greater than 101', you have shaking chills, a fast heartbeat, trouble breathing and/or feel your are breathing much faster than usual, call your healthcare provider.  Make sure you wash your hands frequently.    Diagnosis: Pneumonia  Assessment and Plan of Treatment: Pneumonia is a lung infection that can cause a fever, cough, and trouble breathing. You completed the course of antibiotic.   Nutrition is important, eat small frequent meals.  Get lots of rest and drink fluids.  Call your health care provider upon arrival home from hospital and make a follow up appointment for one week.  If your cough worsens, you develop fever greater than 101', you have shaking chills, a fast heartbeat, trouble breathing and/or feel your are breathing much faster than usual, call your healthcare provider.  Make sure you wash your hands frequently.    Diagnosis: Pneumonia  Assessment and Plan of Treatment: Pneumonia is a lung infection that can cause a fever, cough, and trouble breathing. You completed the course of antibiotic.   Nutrition is important, eat small frequent meals.  Get lots of rest and drink fluids.  Call your health care provider upon arrival home from hospital and make a follow up appointment for one week.  If your cough worsens, you develop fever greater than 101', you have shaking chills, a fast heartbeat, trouble breathing and/or feel your are breathing much faster than usual, call your healthcare provider.  Make sure you wash your hands frequently.    Diagnosis: HTN (hypertension)  Assessment and Plan of Treatment: Continue all your medications as prescribed. Recommend the DASH Diet. This diet emphasizes vegetables, fruits, and fat-free or low-fat dairy products.  Includes whole grains, fish, poultry, beans, seeds, nuts, and vegetable oils. Please limit sodium, sweets, sugary beverages, and red meats.    Diagnosis: DM (diabetes mellitus)  Assessment and Plan of Treatment: Make sure you get your HgA1c checked every three months.   check your blood glucose two times a day.  It's important not to skip any meals.  Keep a log of your blood glucose results and always take it with you to your doctor appointments.  Keep a list of your current medications including injectables and over the counter medications and bring this medication list with you to all your doctor appointments.  If you have not seen your ophthalmologist this year call for appointment.  Check your feet daily for redness, sores, or openings. Do not self treat. If no improvement in two days call your primary care physician for an appointment.  Low blood sugar (hypoglycemia) is a blood sugar below 70mg/dl. Check your blood sugar if you feel signs/symptoms of hypoglycemia. If your blood sugar is below 70 take 15 grams of carbohydrates (ex 4 oz of apple juice, 3-4 glucose tablets, or 4-6 oz of regular soda) wait 15 minutes and repeat blood sugar to make sure it comes up above 70.  If your blood sugar is above 70 and you are due for a meal, have a meal.  If you are not due for a meal have a snack.  This snack helps keeps your blood sugar at a safe range.    Diagnosis: Atrial fibrillation  Assessment and Plan of Treatment: Atrial fibrillation is the most common heart rhythm problem.  The condition puts you at risk for has stroke and heart attack  Call your doctor if you feel your heart racing or beating unusually, chest tightness or pain, lightheaded, faint, shortness of breath especially with exercise  It is important to take your heart medication as prescribed  You are on anticoagulation which is very important to take as directed - you may need blood work to monitor drug levels    Diagnosis: CHF (congestive heart failure)  Assessment and Plan of Treatment: Continue all your medications as prescribed   Weigh yourself daily.  If you gain 3lbs in 3 days, or 5lbs in a week call your Health Care Provider.  Do not eat or drink foods containing more than 2000mg of salt (sodium) in your diet every day.  Call your Health Care Provider if you have any swelling or increased swelling in your feet, ankles, and/or stomach.  Take all of your medication as directed.  If you become dizzy call your Health Care Provider.

## 2020-11-20 NOTE — DISCHARGE NOTE PROVIDER - CARE PROVIDER_API CALL
Camilo Alicia  Internal Medicine  [address] [place] [address]  Phone: [phone]  Fax: [phone]  Established Patient  Follow Up Time: 1 week

## 2020-11-21 NOTE — PROGRESS NOTE ADULT - ASSESSMENT
81 yo F with HTN, HLD, DM, vascular dementia, and paroxysmal AF on Eliquis who presented with dyspnea and dry cough. Pt found to have patchy infiltrate of the right lung base on CXR,  Troponin  x 2 negative, mildly elevated Pro BNP 2012, admitted for acute on chronic diastolic HF exacerbation and pneumonia. Received lasix 40 mg IV and started on Ceftriaxone and Azithromycin. lasix 40 mg po continued.

## 2020-11-21 NOTE — PROGRESS NOTE ADULT - SUBJECTIVE AND OBJECTIVE BOX
HPI: Patient is a 80y old  Female who presents with a chief complaint of Acute hypoxic respiratory failure (19 Nov 2020 12:17)    INTERVAL HPI/OVERNIGHT EVENTS: no new complaints, had mild dry cough, and feels aches "all over." Denies CP, F/C, N/V.    MEDICATIONS  (STANDING):  amLODIPine   Tablet 5 milliGRAM(s) Oral daily  apixaban 5 milliGRAM(s) Oral two times a day  aspirin  chewable 81 milliGRAM(s) Oral daily  atorvastatin 40 milliGRAM(s) Oral at bedtime  azithromycin  IVPB 500 milliGRAM(s) IV Intermittent every 24 hours  benzocaine 15 mG/menthol 3.6 mG (Sugar-Free) Lozenge 1 Lozenge Oral every 4 hours  cefTRIAXone   IVPB 1000 milliGRAM(s) IV Intermittent every 24 hours  dextrose 40% Gel 15 Gram(s) Oral once  dextrose 5%. 1000 milliLiter(s) (50 mL/Hr) IV Continuous <Continuous>  dextrose 5%. 1000 milliLiter(s) (100 mL/Hr) IV Continuous <Continuous>  dextrose 50% Injectable 25 Gram(s) IV Push once  dextrose 50% Injectable 12.5 Gram(s) IV Push once  dextrose 50% Injectable 25 Gram(s) IV Push once  donepezil 10 milliGRAM(s) Oral at bedtime  furosemide    Tablet 40 milliGRAM(s) Oral daily  glucagon  Injectable 1 milliGRAM(s) IntraMuscular once  insulin glargine Injectable (LANTUS) 5 Unit(s) SubCutaneous at bedtime  insulin lispro (ADMELOG) corrective regimen sliding scale   SubCutaneous three times a day before meals  insulin lispro Injectable (ADMELOG) 3 Unit(s) SubCutaneous three times a day before meals  losartan 50 milliGRAM(s) Oral daily  melatonin 5 milliGRAM(s) Oral once  metoprolol tartrate 12.5 milliGRAM(s) Oral two times a day  potassium chloride   Powder 40 milliEquivalent(s) Oral once    MEDICATIONS  (PRN):  guaiFENesin   Syrup  (Sugar-Free) 100 milliGRAM(s) Oral every 6 hours PRN Cough    __________________________________________________  REVIEW OF SYSTEMS:    CONSTITUTIONAL: No fever,   EYES: no acute visual disturbances  NECK: No pain or stiffness  RESPIRATORY: +  cough; No shortness of breath  CARDIOVASCULAR: No chest pain, no palpitations  GASTROINTESTINAL: No pain. No nausea or vomiting; No diarrhea   NEUROLOGICAL: No headache or numbness, no tremors  MUSCULOSKELETAL: No joint pain, no muscle pain  GENITOURINARY: no dysuria, no frequency, no hesitancy        Vital Signs Last 24 Hrs  Vital Signs Last 24 Hrs  T(C): 36.3 (21 Nov 2020 04:53), Max: 36.4 (20 Nov 2020 21:06)  T(F): 97.3 (21 Nov 2020 04:53), Max: 97.6 (20 Nov 2020 21:06)  HR: 62 (21 Nov 2020 04:53) (62 - 93)  BP: 123/61 (21 Nov 2020 04:53) (122/46 - 147/50)  BP(mean): --  RR: 18 (21 Nov 2020 04:53) (16 - 18)  SpO2: 98% (21 Nov 2020 04:53) (93% - 98%)    ________________________________________________  PHYSICAL EXAM:  GENERAL: NAD, obese  female  CHEST/LUNG: decreased breath sounds gaurav   HEART: S1 S2  irregular;   ABDOMEN: Soft, Nontender, Nondistended; Bowel sounds present  EXTREMITIES: + 2 LE  edema; no calf tenderness  SKIN: warm and dry; no rash  NERVOUS SYSTEM:  Awake and alert; Oriented  to place, person and disoriented to  time; no new deficits    ---  LABS personally reviewed:	 	                        15.0   10.11 )-----------( 242      ( 21 Nov 2020 07:25 )             48.3     11-21    143  |  102  |  14  ----------------------------<  190<H>  3.7   |  29  |  0.95  11-20    141  |  101  |  15  ----------------------------<  265<H>  3.4<L>   |  34<H>  |  1.06    Ca    9.0      21 Nov 2020 07:25  Ca    8.8      20 Nov 2020 12:41      proBNP: Serum Pro-Brain Natriuretic Peptide: 2164 pg/mL (11-18 @ 07:43)  Serum Pro-Brain Natriuretic Peptide: 2012 pg/mL (11-17 @ 22:49)      POCT Blood Glucose.: 380 mg/dL (21 Nov 2020 11:53)  POCT Blood Glucose.: 209 mg/dL (21 Nov 2020 07:41)  POCT Blood Glucose.: 181 mg/dL (20 Nov 2020 21:19)  POCT Blood Glucose.: 278 mg/dL (20 Nov 2020 17:14)    BCX/UCX: .Blood Blood  11-18-20   No growth to date.  --  --        Plan of care was discussed with patient and /or primary care giver; all questions and concerns were addressed and care was aligned with patient's wishes.

## 2020-11-21 NOTE — CHART NOTE - NSCHARTNOTEFT_GEN_A_CORE
EVENT: Probable small bilateral pleural effusions.x          OBJECTIVE:  Vital Signs Last 24 Hrs  T(C): 36.4 (20 Nov 2020 21:06), Max: 36.4 (20 Nov 2020 21:06)  T(F): 97.6 (20 Nov 2020 21:06), Max: 97.6 (20 Nov 2020 21:06)  HR: 71 (20 Nov 2020 21:06) (71 - 93)  BP: 122/46 (20 Nov 2020 21:06) (122/46 - 147/50)  BP(mean): --  RR: 17 (20 Nov 2020 21:06) (16 - 18)  SpO2: 97% (20 Nov 2020 21:06) (93% - 97%)    FOCUSED PHYSICAL EXAM:    LABS:                        13.7   13.83 )-----------( 258      ( 20 Nov 2020 12:41 )             43.7     11-20    141  |  101  |  15  ----------------------------<  265<H>  3.4<L>   |  34<H>  |  1.06    Ca    8.8      20 Nov 2020 12:41  Phos  3.3     11-19  Mg     2.3     11-19        EKG:   IMGAGING:  EXAM:  XR CHEST PORTABLE URGENT 1V                        PROCEDURE DATE:  11/20/2020    INTERPRETATION:  CLINICAL STATEMENT: Follow-up chest pain.  TECHNIQUE: AP view of the chest.  COMPARISON: 11/17/2020  FINDINGS/  IMPRESSION:  Worsening airspace opacity right lung base. Follow-up recommended.  Probable small bilateral pleural effusions.x  Heart size cannot be accurately assessed in this projection.    JET LAMB MD; Attending Radiologist  This document has beenelectronically signed. Nov 20 2020  5:41PM    ASSESSMENT:  HPI:  Patient is a 80 year old female from home ambulates with walker has HHA with a PMH of kidney ca right side, HTN, HLD, DM, Vascular Dementia, h/o TIA in 3/2020, Paroxysmal Atrial Fibrillation who presented with a chief complaint of shortness of breath.  Patient states that beginning approximately 2 days prior to current presentation, she began to experience a rapidly progressive worsening of a non-productive cough along with shortness of breath.  Patient denies having experienced symptoms such as this before, for which she decided to come to [facility] ED for further evaluation.pT daughter at bedside reports pt is recently diagnosed with right kidney cancer and is planed for surgery after cayden. Pt has chronic swelling of her both legs and is on lasix at home. Pt was decreased appetite recently and was cut back on diabetic medications.    In the ED, patient was seen sitting up in bed, on supplemental oxygen through NC, with difficulty speaking full sentences due to frequent coughing, tachypneic into high 20's low 30s when attempting to speak.  ICU was consulted, not a candidate at this time.  (18 Nov 2020 02:23)      PLAN:   MEDICATIONS  (STANDING):  amLODIPine   Tablet 5 milliGRAM(s) Oral daily  apixaban 5 milliGRAM(s) Oral two times a day  aspirin  chewable 81 milliGRAM(s) Oral daily  atorvastatin 40 milliGRAM(s) Oral at bedtime  azithromycin  IVPB 500 milliGRAM(s) IV Intermittent every 24 hours  benzocaine 15 mG/menthol 3.6 mG (Sugar-Free) Lozenge 1 Lozenge Oral every 4 hours  cefTRIAXone   IVPB 1000 milliGRAM(s) IV Intermittent every 24 hours  dextrose 40% Gel 15 Gram(s) Oral once  dextrose 5%. 1000 milliLiter(s) (50 mL/Hr) IV Continuous <Continuous>  dextrose 5%. 1000 milliLiter(s) (100 mL/Hr) IV Continuous <Continuous>  dextrose 50% Injectable 25 Gram(s) IV Push once  dextrose 50% Injectable 12.5 Gram(s) IV Push once  dextrose 50% Injectable 25 Gram(s) IV Push once  donepezil 10 milliGRAM(s) Oral at bedtime  furosemide    Tablet 40 milliGRAM(s) Oral daily  furosemide   Injectable 40 milliGRAM(s) IV Push once  glucagon  Injectable 1 milliGRAM(s) IntraMuscular once  insulin glargine Injectable (LANTUS) 5 Unit(s) SubCutaneous at bedtime  insulin lispro (ADMELOG) corrective regimen sliding scale   SubCutaneous three times a day before meals  insulin lispro Injectable (ADMELOG) 3 Unit(s) SubCutaneous three times a day before meals  losartan 50 milliGRAM(s) Oral daily  melatonin 5 milliGRAM(s) Oral once  metoprolol tartrate 12.5 milliGRAM(s) Oral two times a day    MEDICATIONS  (PRN):  guaiFENesin   Syrup  (Sugar-Free) 100 milliGRAM(s) Oral every 6 hours PRN Cough    FOLLOW UP / RESULT: EVENT: Repeat CXR showed worsening airspace opacity right lung base, probable small bilateral pleural effusions.    BRIEF HPI:79 yo F with HTN, HLD, DM, vascular dementia, and paroxysmal AF on Eliquis who presented with dyspnea and dry cough. Pt found to have   Patchy infiltrate of the right lung base on CXR,  Troponin  x 2 negative, mildly elevated Pro BNP 2012, Admitted for acute on chronic diastolic HF exacerbation and  pneumonia. Received Lasix 40 mg IV and started on Ceftriaxone and Azithromycin. Lasix 40 mg po continued. Patient seen at bedside, alert and awake, disoriented to time, states feels better and wants to go home. Labs + hypokalemia 3.4, lactate 2.9, WBC 13, blood sugar elevated Discharge planning in 1-2 days. Acute on chronic diastolic congestive heart failure.  Continue Lasix 40 mg daily, PLAN: fu repeat chest x-ray. Dr Sweeney pulmonary on board     OBJECTIVE:  Vital Signs Last 24 Hrs  T(C): 36.4 (20 Nov 2020 21:06), Max: 36.4 (20 Nov 2020 21:06)  T(F): 97.6 (20 Nov 2020 21:06), Max: 97.6 (20 Nov 2020 21:06)  HR: 71 (20 Nov 2020 21:06) (71 - 93)  BP: 122/46 (20 Nov 2020 21:06) (122/46 - 147/50)  BP(mean): --  RR: 17 (20 Nov 2020 21:06) (16 - 18)  SpO2: 97% (20 Nov 2020 21:06) (93% - 97%)    FOCUSED PHYSICAL EXAM:  NEURO: Awake, oriented to name  RESP: even unlabored, decrease at lung bases  CV: S1 S2, regular    LABS:                        13.7   13.83 )-----------( 258      ( 20 Nov 2020 12:41 )             43.7     11-20    141  |  101  |  15  ----------------------------<  265<H>  3.4<L>   |  34<H>  |  1.06    Ca    8.8      20 Nov 2020 12:41  Phos  3.3     11-19  Mg     2.3     11-19    IMAGING:  EXAM:  XR CHEST PORTABLE URGENT 1V                        PROCEDURE DATE:  11/20/2020    INTERPRETATION:  CLINICAL STATEMENT: Follow-up chest pain.  TECHNIQUE: AP view of the chest.  COMPARISON: 11/17/2020  FINDINGS/  IMPRESSION:  Worsening airspace opacity right lung base. Follow-up recommended.  Probable small bilateral pleural effusions.  Heart size cannot be accurately assessed in this projection.    ASSESSMENT: 80 year old female,  ambulates with walker has HHA with a PMH of kidney ca right side, HTN, HLD, DM, Vascular Dementia, h/o TIA in 3/2020, Paroxysmal Atrial Fibrillation who presented with a chief complaint of shortness of breath.  Now repeat CXR worsening airspace opacity right lung base, follow-up recommended.  Probable small bilateral pleural effusions.    PROBLEM: Dry Cough Probable due to small bilateral pleural effusions.  PLAN:   1. Furosemide Injectable 40 cj GRAM(s) IV Push once now per sign out  2. Cont azithromycin  IVPB 500 cj GRAM(s) IV Intermittent every 24 hours  3. Cont benzocaine 15 mG/menthol 3.6 mG (Sugar-Free) Lozenge 1 Lozenge Oral every 4 hours  4. Cont ceftriaxone   IVPB 1000 cj GRAM(s) IV Intermittent every 24 hours  5. Cont furosemide Tablet 40 cj GRAM(s) Oral daily  6. Cont guaianesin   Syrup  (Sugar-Free) 100 cj GRAM(s) Oral every 6 hours PRN Cough    FOLLOW UP / RESULT: Reassess resp status

## 2020-11-21 NOTE — PROGRESS NOTE ADULT - PROBLEM SELECTOR PLAN 1
continue lasix 40 mg daily   Dr Sweeney pulmonary on board   continue Metoprolol 12.5mg PO BID  cardiology Dr Duque on board   repeat TTE noted - pulm HTN EF 55%  Fluid Restrictions <2000 daily  Strict I&O   Daily Weights

## 2020-11-21 NOTE — PROGRESS NOTE ADULT - SUBJECTIVE AND OBJECTIVE BOX
Time of Visit:  Patient seen and examined.     MEDICATIONS  (STANDING):  amLODIPine   Tablet 5 milliGRAM(s) Oral daily  apixaban 5 milliGRAM(s) Oral two times a day  aspirin  chewable 81 milliGRAM(s) Oral daily  atorvastatin 40 milliGRAM(s) Oral at bedtime  azithromycin  IVPB 500 milliGRAM(s) IV Intermittent every 24 hours  benzocaine 15 mG/menthol 3.6 mG (Sugar-Free) Lozenge 1 Lozenge Oral every 4 hours  cefTRIAXone   IVPB 1000 milliGRAM(s) IV Intermittent every 24 hours  dextrose 40% Gel 15 Gram(s) Oral once  dextrose 5%. 1000 milliLiter(s) (50 mL/Hr) IV Continuous <Continuous>  dextrose 5%. 1000 milliLiter(s) (100 mL/Hr) IV Continuous <Continuous>  dextrose 50% Injectable 25 Gram(s) IV Push once  dextrose 50% Injectable 12.5 Gram(s) IV Push once  dextrose 50% Injectable 25 Gram(s) IV Push once  donepezil 10 milliGRAM(s) Oral at bedtime  furosemide    Tablet 40 milliGRAM(s) Oral daily  glucagon  Injectable 1 milliGRAM(s) IntraMuscular once  insulin glargine Injectable (LANTUS) 7 Unit(s) SubCutaneous at bedtime  insulin lispro (ADMELOG) corrective regimen sliding scale   SubCutaneous three times a day before meals  insulin lispro Injectable (ADMELOG) 5 Unit(s) SubCutaneous three times a day before meals  losartan 50 milliGRAM(s) Oral daily  melatonin 5 milliGRAM(s) Oral once  metoprolol tartrate 12.5 milliGRAM(s) Oral two times a day      MEDICATIONS  (PRN):  acetaminophen   Tablet .. 650 milliGRAM(s) Oral every 6 hours PRN Mild Pain (1 - 3), Moderate Pain (4 - 6)  guaiFENesin   Syrup  (Sugar-Free) 100 milliGRAM(s) Oral every 6 hours PRN Cough       Medications up to date at time of exam.      PHYSICAL EXAMINATION:  Patient has no new complaints.  GENERAL: The patient is a well-developed, well-nourished, in no apparent distress.     Vital Signs Last 24 Hrs  T(C): 36.7 (2020 13:19), Max: 36.7 (2020 13:19)  T(F): 98 (2020 13:19), Max: 98 (2020 13:19)  HR: 83 (2020 13:19) (62 - 93)  BP: 120/42 (2020 13:19) (120/42 - 147/50)  BP(mean): 62 (2020 13:19) (62 - 62)  RR: 18 (2020 13:19) (17 - 18)  SpO2: 98% (2020 13:19) (97% - 98%)   (if applicable)    Chest Tube (if applicable)    HEENT: Head is normocephalic and atraumatic. Extraocular muscles are intact. Mucous membranes are moist.     NECK: Supple, no palpable adenopathy.    LUNGS: Clear to auscultation, no wheezing, rales, or rhonchi.    HEART: Regular rate and rhythm without murmur.    ABDOMEN: Soft, nontender, and nondistended.  No hepatosplenomegaly is noted.    : No painful voiding, no pelvic pain    EXTREMITIES: no c/c/e. + right hand congenital malformation     NEUROLOGIC: Awake, alert, oriented, grossly intact    SKIN: Warm, dry, good turgor.      LABS:                        15.0   10.11 )-----------( 242      ( 2020 07:25 )             48.3         143  |  102  |  14  ----------------------------<  190<H>  3.7   |  29  |  0.95    Ca    9.0      2020 07:25                    Lactate, Blood: 2.2 mmol/L (20 @ 07:27)        MICROBIOLOGY: (if applicable)    RADIOLOGY & ADDITIONAL STUDIES:  EKG:   CXR:  < from: Xray Chest 1 View- PORTABLE-Urgent (Xray Chest 1 View- PORTABLE-Urgent .) (20 @ 16:52) >    EXAM:  XR CHEST PORTABLE URGENT 1V                            PROCEDURE DATE:  2020          INTERPRETATION:  CLINICAL STATEMENT: Follow-up chest pain.    TECHNIQUE: AP view of the chest.    COMPARISON: 2020    FINDINGS/  IMPRESSION:  Worsening airspace opacity right lung base. Follow-up recommended.    Probable small bilateral pleural effusions.x    Heart size cannot be accurately assessed in this projection.              JET LAMB MD; Attending Radiologist  This document has beenelectronically signed. 2020  5:41PM    < end of copied text >  ECHO:  < from: Transthoracic Echocardiogram (20 @ 09:16) >    Patient name: BAILEY WAGGONER  YOB: 1940   Age: 80 (F)   MR#: [id number]  Study Date: 2020  Location: [address] [place], [place] [address]Sonographer: Gisselle Ramos [facility]  Study quality: Technically difficult  Referring Physician:  TRINIDAD CASTILLO MD  Blood Pressure: 109/60 mmHg  Height: 150 cm  Weight: 80 kg  BSA: 1.8 m2  ------------------------------------------------------------------------    PROCEDURE: Transthoracic echocardiogram with 2-D, M-Mode  and complete spectral and color flow Doppler.  INDICATION: Heart failure, unspecified (I50.9)  HISTORY:  ------------------------------------------------------------------------  DIMENSIONS:  Dimensions:     Normal Values:  LA:     3.8 cm    2.0 - 4.0 cm  Ao:     3.2 cm    2.0 - 3.8 cm  SEPTUM: 0.9 cm  0.6 - 1.2 cm  PWT:    0.9 cm    0.6 - 1.1 cm  LVIDd:  5.5 cm    3.0 - 5.6 cm  LVIDs:  3.5 cm    1.8 - 4.0 cm      Derived Variables:  LVMI: 106 g/m2  RWT: 0.32  Ejection Fraction Visual Estimate: >55 %    ------------------------------------------------------------------------  OBSERVATIONS:  Mitral Valve: Mitral annular calcification. Mild mitral  regurgitation.  Aortic Root: Normal aortic root.  Aortic Valve: Normal trileaflet aortic valve.  Left Atrium: Normal left atrium.  LA volume index =27  cc/m2.  Left Ventricle: Endocardium not well visualized; grossly  normal left ventricular systolic function.   Segmental wall  motion could not be assessed. Increased relative wall  thickness with normal left ventricular (LV) mass index,  consistent with concentric LV remodeling.  Right Heart: Normal right atrium. Right ventricle not well  visualized. Probably normal right ventricular size and  systolic function. There is mild tricuspid regurgitation.  There is mild pulmonic regurgitation.  Pericardium/PleuraNormal pericardium with no pericardial  effusion.  Hemodynamic: RA Pressure is 10 mm Hg. RV systolic pressure  is 51 mm Hg. Moderate pulmonary hypertension.  ------------------------------------------------------------------------  CONCLUSIONS:  TECHNICALLY VERY DIFFICULT STUDY, POOR ACOUSTIC WINDOWS    1. Mitral annular calcification. Mild mitral regurgitation.    2. Increased relative wall thickness with normal left  ventricular (LV) mass index, consistent with concentric LV  remodeling.  3. Endocardium not well visualized; grossly normal left  ventricular systolic function.   Segmental wall motion  could not be assessed.  4. Right ventricle not well visualized. Probably normal  right ventricular size and systolic function.  5. RV systolic pressure is 51 mm Hg. Moderate pulmonary  hypertension.    ------------------------------------------------------------------------  Confirmed on  2020 - 20:49:40 by Roberto Moore MD  ------------------------------------------------------------------------    < end of copied text >    IMPRESSION: 80y Female PAST MEDICAL & SURGICAL HISTORY:  Afib    Dementia    HLD (hyperlipidemia)    HTN (hypertension)    DM (diabetes mellitus)    H/O:      p/w           IMP: This is a 80 yr old woman with  + second smoke exposure from late  ,uncontrol DM, HTN, HDL, vascular dementia, CAD, afib  admitted for acute hypoxic hypercapnic resp failure due to a combination of b/l PNA and heart failure. Covid-19 neg.    - Acute Hypoxic Hypercapnic Resp Failure  - PAN.. b/l.. CAP  - Pul edema  - Heart Failure  - DM -2 uncontrol   - HLD  - CAD/ Afib  - Moderate Pul HtN.        Sugg;    -Diuresis   -echo reported noted   -O2 supp , keep sat>90%  -continue antibx for CAP coverage  -cultures neg   -blood sugar control   -dvt/gi prophy

## 2020-11-22 NOTE — CHART NOTE - NSCHARTNOTEFT_GEN_A_CORE
EVENT: right leg pain    HPI:   Patient is a 80 year old female from home ambulates with walker has HHA with a PMH of kidney ca right side, HTN, HLD, DM, Vascular Dementia, h/o TIA in 3/2020, Paroxysmal Atrial Fibrillation who presented with a chief complaint of shortness of breath.  Patient states that beginning approximately 2 days prior to current presentation, she began to experience a rapidly progressive worsening of a non-productive cough along with shortness of breath.  Patient denies having experienced symptoms such as this before, for which she decided to come to [facility] ED for further evaluation.pT daughter at bedside reports pt is recently diagnosed with right kidney cancer and is planed for surgery after [place]. Pt has chronic swelling of her both legs and is on lasix at home. Pt was decreased appetite recently and was cut back on diabetic medications.  11/22/2020 - Patient c/o right leg pain, denies calf pain/tenderness, chest pain or SOB. Pt with no relief from Tylenol.     OBJECTIVE:  Vital Signs Last 24 Hrs  T(C): 36.6 (21 Nov 2020 21:48), Max: 36.7 (21 Nov 2020 13:19)  T(F): 97.9 (21 Nov 2020 21:48), Max: 98 (21 Nov 2020 13:19)  HR: 80 (21 Nov 2020 21:48) (62 - 90)  BP: 132/59 (21 Nov 2020 21:48) (115/68 - 132/59)  BP(mean): 62 (21 Nov 2020 13:19) (62 - 62)  RR: 19 (21 Nov 2020 21:48) (18 - 19)  SpO2: 100% (21 Nov 2020 21:48) (98% - 100%)    LABS:                        15.0   10.11 )-----------( 242      ( 21 Nov 2020 07:25 )             48.3     11-21    143  |  102  |  14  ----------------------------<  190<H>  3.7   |  29  |  0.95    Ca    9.0      21 Nov 2020 07:25      PLAN: Right leg pain  HYDROmorphone  Injectable 0.5 milliGRAM(s) IV Push once PRN Moderate Pain (4 - 6)  Reassess pain level           In the ED, patient was seen sitting up in bed, on supplemental oxygen through NC, with difficulty speaking full sentences due to frequent coughing, tachypneic into high 20's low 30s when attempting to speak.  ICU was consulted, not a candidate at this time.  (18 Nov 2020 02:23)      PLAN:

## 2020-11-22 NOTE — PROGRESS NOTE ADULT - PROBLEM SELECTOR PLAN 4
recently diagnosed with renal cancer and has plans for surgery in [place]   CT Abdomen/Pelvis with IV contrast (10/3/2020) identified  right kidney demonstrates 2 heterogeneous lesions, measuring 2 cm in the upper pole and 4.8 cm in the lower pole concerning for renal cell carcinoma.  outpatient follow up

## 2020-11-22 NOTE — PROGRESS NOTE ADULT - SUBJECTIVE AND OBJECTIVE BOX
HPI: Patient is a 80y old  Female who presents with a chief complaint of Acute hypoxic respiratory failure (19 Nov 2020 12:17)    INTERVAL HPI/OVERNIGHT EVENTS: no new complaints, pain stable, no F/C, CP, SOB, N/V. Pt mildly agitated, stating "I want to go home, I want to go home...  I'm almost 81... I don't belong here."    MEDICATIONS  (STANDING):  amLODIPine   Tablet 5 milliGRAM(s) Oral daily  apixaban 5 milliGRAM(s) Oral two times a day  aspirin  chewable 81 milliGRAM(s) Oral daily  atorvastatin 40 milliGRAM(s) Oral at bedtime  azithromycin  IVPB 500 milliGRAM(s) IV Intermittent every 24 hours  benzocaine 15 mG/menthol 3.6 mG (Sugar-Free) Lozenge 1 Lozenge Oral every 4 hours  cefTRIAXone   IVPB 1000 milliGRAM(s) IV Intermittent every 24 hours  dextrose 40% Gel 15 Gram(s) Oral once  dextrose 5%. 1000 milliLiter(s) (50 mL/Hr) IV Continuous <Continuous>  dextrose 5%. 1000 milliLiter(s) (100 mL/Hr) IV Continuous <Continuous>  dextrose 50% Injectable 25 Gram(s) IV Push once  dextrose 50% Injectable 12.5 Gram(s) IV Push once  dextrose 50% Injectable 25 Gram(s) IV Push once  donepezil 10 milliGRAM(s) Oral at bedtime  furosemide    Tablet 40 milliGRAM(s) Oral daily  glucagon  Injectable 1 milliGRAM(s) IntraMuscular once  insulin glargine Injectable (LANTUS) 7 Unit(s) SubCutaneous at bedtime  insulin lispro (ADMELOG) corrective regimen sliding scale   SubCutaneous three times a day before meals  insulin lispro Injectable (ADMELOG) 5 Unit(s) SubCutaneous three times a day before meals  losartan 50 milliGRAM(s) Oral daily  melatonin 5 milliGRAM(s) Oral once  metoprolol tartrate 12.5 milliGRAM(s) Oral two times a day  potassium chloride   Powder 40 milliEquivalent(s) Oral once    MEDICATIONS  (PRN):  guaiFENesin   Syrup  (Sugar-Free) 100 milliGRAM(s) Oral every 6 hours PRN Cough    __________________________________________________    Vital Signs Last 24 Hrs  T(C): 36.8 (22 Nov 2020 12:29), Max: 36.9 (22 Nov 2020 05:49)  T(F): 98.2 (22 Nov 2020 12:29), Max: 98.5 (22 Nov 2020 05:49)  HR: 53 (22 Nov 2020 12:29) (53 - 90)  BP: 147/113 (22 Nov 2020 12:29) (111/72 - 147/113)  BP(mean): --  RR: 20 (22 Nov 2020 12:29) (18 - 20)  SpO2: 100% (22 Nov 2020 12:29) (99% - 100%)    ________________________________________________  PHYSICAL EXAM:  GENERAL: NAD, obese  female, irritated  CHEST/LUNG: decreased breath sounds B/L  HEART: S1 S2  irregular;   ABDOMEN: Soft, Nontender, Nondistended; Bowel sounds present  EXTREMITIES: + 2 LE  edema; no calf tenderness, microdactyly  SKIN: warm and dry; no rash  NERVOUS SYSTEM:  Awake and alert; Oriented  to person; no new deficits  PSYCH: anxious/mildly agitated appearing    ---  LABS personally reviewed: hypokalemic, mild leukocytosis                        15.0   12.70 )-----------( 250      ( 22 Nov 2020 07:34 )             47.8     11-22    141  |  100  |  12  ----------------------------<  237<H>  3.3<L>   |  31  |  0.85  11-21    143  |  102  |  14  ----------------------------<  190<H>  3.7   |  29  |  0.95    Ca    9.0      22 Nov 2020 07:34  Ca    9.0      21 Nov 2020 07:25      proBNP: Serum Pro-Brain Natriuretic Peptide: 2164 pg/mL (11-18 @ 07:43)  Serum Pro-Brain Natriuretic Peptide: 2012 pg/mL (11-17 @ 22:49)        POCT Blood Glucose.: 189 mg/dL (22 Nov 2020 11:59)  POCT Blood Glucose.: 268 mg/dL (22 Nov 2020 08:12)  POCT Blood Glucose.: 222 mg/dL (21 Nov 2020 21:19)  POCT Blood Glucose.: 305 mg/dL (21 Nov 2020 16:55)    BCX/UCX: .Blood Blood  11-18-20   No growth to date.  --  --      Plan of care was discussed with patient and /or primary care giver; all questions and concerns were addressed and care was aligned with patient's wishes.

## 2020-11-22 NOTE — PROGRESS NOTE ADULT - PROBLEM SELECTOR PLAN 1
continue lasix 40 mg daily   Dr Sweeney pulmonary on board   continue Metoprolol 12.5mg PO BID  cardiology Dr Duque on board   repeat TTE noted - pulm HTN EF 55%  Fluid Restrictions <1500 daily  Strict I&O   Daily Weights

## 2020-11-22 NOTE — PROGRESS NOTE ADULT - SUBJECTIVE AND OBJECTIVE BOX
Time of Visit:  Patient seen and examined.     MEDICATIONS  (STANDING):  amLODIPine   Tablet 5 milliGRAM(s) Oral daily  apixaban 5 milliGRAM(s) Oral two times a day  aspirin  chewable 81 milliGRAM(s) Oral daily  atorvastatin 40 milliGRAM(s) Oral at bedtime  benzocaine 15 mG/menthol 3.6 mG (Sugar-Free) Lozenge 1 Lozenge Oral every 4 hours  dextrose 40% Gel 15 Gram(s) Oral once  dextrose 5%. 1000 milliLiter(s) (50 mL/Hr) IV Continuous <Continuous>  dextrose 5%. 1000 milliLiter(s) (100 mL/Hr) IV Continuous <Continuous>  dextrose 50% Injectable 25 Gram(s) IV Push once  dextrose 50% Injectable 12.5 Gram(s) IV Push once  dextrose 50% Injectable 25 Gram(s) IV Push once  donepezil 10 milliGRAM(s) Oral at bedtime  furosemide    Tablet 40 milliGRAM(s) Oral daily  glucagon  Injectable 1 milliGRAM(s) IntraMuscular once  insulin glargine Injectable (LANTUS) 9 Unit(s) SubCutaneous at bedtime  insulin lispro (ADMELOG) corrective regimen sliding scale   SubCutaneous three times a day before meals  insulin lispro Injectable (ADMELOG) 5 Unit(s) SubCutaneous three times a day before meals  lidocaine 2% Gel 1 Application(s) Topical three times a day  losartan 50 milliGRAM(s) Oral daily  metoprolol tartrate 12.5 milliGRAM(s) Oral two times a day  potassium chloride    Tablet ER 20 milliEquivalent(s) Oral once      MEDICATIONS  (PRN):  acetaminophen   Tablet .. 650 milliGRAM(s) Oral every 6 hours PRN Mild Pain (1 - 3), Moderate Pain (4 - 6)  guaiFENesin   Syrup  (Sugar-Free) 100 milliGRAM(s) Oral every 6 hours PRN Cough       Medications up to date at time of exam.      PHYSICAL EXAMINATION:  Patient has no new complaints.  GENERAL: The patient is a well-developed, well-nourished, in no apparent distress.     Vital Signs Last 24 Hrs  T(C): 36.8 (2020 12:29), Max: 36.9 (2020 05:49)  T(F): 98.2 (2020 12:29), Max: 98.5 (2020 05:49)  HR: 53 (2020 12:29) (53 - 90)  BP: 147/113 (2020 12:29) (111/72 - 147/113)  BP(mean): --  RR: 20 (2020 12:29) (18 - 20)  SpO2: 100% (2020 12:29) (99% - 100%)   (if applicable)    Chest Tube (if applicable)    HEENT: Head is normocephalic and atraumatic. Extraocular muscles are intact. Mucous membranes are moist.     NECK: Supple, no palpable adenopathy.    LUNGS: Clear to auscultation, no wheezing, rales, or rhonchi.    HEART: Regular rate and rhythm without murmur.    ABDOMEN: Soft, nontender, and nondistended.  No hepatosplenomegaly is noted.    : No painful voiding, no pelvic pain    EXTREMITIES: Without any cyanosis, clubbing, rash, lesions or edema.    NEUROLOGIC: Awake, forgettful    SKIN: Warm, dry, good turgor.      LABS:                        15.0   12.70 )-----------( 250      ( 2020 07:34 )             47.8     11-    141  |  100  |  12  ----------------------------<  237<H>  3.3<L>   |  31  |  0.85    Ca    9.0      2020 07:34                          MICROBIOLOGY: (if applicable)    RADIOLOGY & ADDITIONAL STUDIES:  EKG:   CXR:  ECHO:    IMPRESSION: 80y Female PAST MEDICAL & SURGICAL HISTORY:  Afib    Dementia    HLD (hyperlipidemia)    HTN (hypertension)    DM (diabetes mellitus)    H/O:      p/w             IMP: This is a 80 yr old woman with  + second smoke exposure from late  ,uncontrol DM, HTN, HDL, vascular dementia, CAD, afib  admitted for acute hypoxic hypercapnic resp failure due to a combination of b/l PNA and heart failure. Covid-19 neg.    - Acute Hypoxic Hypercapnic Resp Failure  - PAN.. b/l.. CAP  - Pul edema  - Heart Failure  - DM -2 uncontrol   - HLD  - CAD/ Afib  - Moderate Pul HtN.        Sugg;    -Diuresis   -lasix 40 mg x 1 dose  -pat is afebrile and neg cultures   -continue antibx for total 7 days  -O2 supp , keep sat>90%  -cultures neg   -blood sugar control   -dvt/gi prophy  -will need serial cxr to evaluate Right effusion

## 2020-11-23 NOTE — PROGRESS NOTE ADULT - PROBLEM SELECTOR PLAN 4
Eliquis 5mg PO BID   rate controlled on metoprolol Eliquis 5mg PO BID   Afib with   Consider increasing metoprolol if HR remains elevated

## 2020-11-23 NOTE — PROGRESS NOTE ADULT - PROBLEM SELECTOR PLAN 1
continue lasix 40 mg daily   Dr Sweeney pulmonary on board   continue Metoprolol 12.5mg PO BID  cardiology Dr Duque on board   repeat TTE noted - pulm HTN EF 55%  Fluid Restrictions <1500 daily  Strict I&O   Daily Weights continue lasix 40 mg daily, atorvastatin - got extra dose of lasix 9/21 without much improvement  Dr Sweeney pulmonary on board   continue Metoprolol 12.5mg PO BID  cardiology Dr Duque on board   repeat TTE noted - pulm HTN EF 55%  Fluid Restrictions <1500 daily  Strict I&O   Daily Weights

## 2020-11-23 NOTE — PROGRESS NOTE ADULT - PROBLEM SELECTOR PLAN 5
Recently diagnosed with renal cancer and has plans for surgery in [place]   CT Abdomen/Pelvis with IV contrast findings concerning for renal cell carcinoma

## 2020-11-23 NOTE — PROGRESS NOTE ADULT - SUBJECTIVE AND OBJECTIVE BOX
NP Note discussed with  Primary Attending    Patient is a 80y old  Female who presents with a chief complaint of Acute hypoxic respiratory failure (23 Nov 2020 14:47)      INTERVAL HPI/OVERNIGHT EVENTS: no new complaints    MEDICATIONS  (STANDING):  amLODIPine   Tablet 5 milliGRAM(s) Oral daily  apixaban 5 milliGRAM(s) Oral two times a day  aspirin  chewable 81 milliGRAM(s) Oral daily  atorvastatin 40 milliGRAM(s) Oral at bedtime  azithromycin  IVPB      benzocaine 15 mG/menthol 3.6 mG (Sugar-Free) Lozenge 1 Lozenge Oral every 4 hours  cefTRIAXone   IVPB      dextrose 40% Gel 15 Gram(s) Oral once  dextrose 5%. 1000 milliLiter(s) (50 mL/Hr) IV Continuous <Continuous>  dextrose 5%. 1000 milliLiter(s) (100 mL/Hr) IV Continuous <Continuous>  dextrose 50% Injectable 25 Gram(s) IV Push once  dextrose 50% Injectable 12.5 Gram(s) IV Push once  dextrose 50% Injectable 25 Gram(s) IV Push once  donepezil 10 milliGRAM(s) Oral at bedtime  furosemide    Tablet 40 milliGRAM(s) Oral daily  glucagon  Injectable 1 milliGRAM(s) IntraMuscular once  insulin glargine Injectable (LANTUS) 9 Unit(s) SubCutaneous at bedtime  insulin lispro (ADMELOG) corrective regimen sliding scale   SubCutaneous three times a day before meals  insulin lispro Injectable (ADMELOG) 5 Unit(s) SubCutaneous three times a day before meals  lidocaine 2% Gel 1 Application(s) Topical three times a day  losartan 50 milliGRAM(s) Oral daily  metoprolol tartrate 12.5 milliGRAM(s) Oral two times a day  potassium chloride    Tablet ER 40 milliEquivalent(s) Oral every 4 hours  potassium phosphate / sodium phosphate Tablet (K-PHOS No. 2) 1 Tablet(s) Oral three times a day    MEDICATIONS  (PRN):  acetaminophen   Tablet .. 650 milliGRAM(s) Oral every 6 hours PRN Mild Pain (1 - 3), Moderate Pain (4 - 6)  guaiFENesin   Syrup  (Sugar-Free) 100 milliGRAM(s) Oral every 6 hours PRN Cough      __________________________________________________  REVIEW OF SYSTEMS:    CONSTITUTIONAL: No fever,   EYES: no acute visual disturbances  NECK: No pain or stiffness  RESPIRATORY: No cough; No shortness of breath  CARDIOVASCULAR: No chest pain, no palpitations  GASTROINTESTINAL: No pain. No nausea or vomiting; No diarrhea   NEUROLOGICAL: No headache or numbness, no tremors  MUSCULOSKELETAL: No joint pain, no muscle pain  GENITOURINARY: no dysuria, no frequency, no hesitancy  PSYCHIATRY: no depression , no anxiety  ALL OTHER  ROS negative        Vital Signs Last 24 Hrs  T(C): 36.3 (23 Nov 2020 12:07), Max: 36.6 (23 Nov 2020 05:27)  T(F): 97.3 (23 Nov 2020 12:07), Max: 97.8 (23 Nov 2020 05:27)  HR: 105 (23 Nov 2020 12:07) (91 - 122)  BP: 112/59 (23 Nov 2020 12:07) (93/66 - 131/54)  BP(mean): --  RR: 20 (23 Nov 2020 12:07) (17 - 20)  SpO2: 94% (23 Nov 2020 12:07) (94% - 98%)    ________________________________________________  PHYSICAL EXAM:  GENERAL: NAD  HEENT: Normocephalic;  conjunctivae and sclerae clear; moist mucous membranes;   NECK : supple  CHEST/LUNG: Clear to auscultation bilaterally with good air entry   HEART: S1 S2  regular; no murmurs, gallops or rubs  ABDOMEN: Soft, Nontender, Nondistended; Bowel sounds present  EXTREMITIES: no cyanosis; no edema; no calf tenderness  SKIN: warm and dry; no rash  NERVOUS SYSTEM:  Awake and alert; Oriented  to place, person and time ; no new deficits    _________________________________________________  LABS:                        14.0   15.25 )-----------( 283      ( 23 Nov 2020 10:24 )             43.4     11-23    139  |  96  |  11  ----------------------------<  149<H>  2.7<LL>   |  37<H>  |  0.81    Ca    8.6      23 Nov 2020 10:24  Phos  2.3     11-23  Mg     1.9     11-23    CAPILLARY BLOOD GLUCOSE      POCT Blood Glucose.: 124 mg/dL (23 Nov 2020 11:25)  POCT Blood Glucose.: 232 mg/dL (23 Nov 2020 07:47)  POCT Blood Glucose.: 154 mg/dL (22 Nov 2020 21:18)  POCT Blood Glucose.: 177 mg/dL (22 Nov 2020 17:09)      RADIOLOGY & ADDITIONAL TESTS:    Imaging  Reviewed:  YES    Consultant(s) Notes Reviewed:   YES      Plan of care was discussed with patient and /or primary care giver; all questions and concerns were addressed

## 2020-11-23 NOTE — PROGRESS NOTE ADULT - ASSESSMENT
81 yo F with HTN, HLD, DM, vascular dementia, and paroxysmal AF on Eliquis who presented with dyspnea and dry cough. Pt found to have patchy infiltrate of the right lung base on CXR,  Troponin  x 2 negative, mildly elevated Pro BNP 2012. Admitted for acute on chronic diastolic HF exacerbation and  pneumonia, treated  with IV diuretics now transitioned to PO. Also treated for PNA with antibiotics. Pt with worsening leukocytosis today, antibiotics restarted.

## 2020-11-23 NOTE — PROGRESS NOTE ADULT - SUBJECTIVE AND OBJECTIVE BOX
Time of Visit:  Patient seen and examined.     MEDICATIONS  (STANDING):  amLODIPine   Tablet 5 milliGRAM(s) Oral daily  apixaban 5 milliGRAM(s) Oral two times a day  aspirin  chewable 81 milliGRAM(s) Oral daily  atorvastatin 40 milliGRAM(s) Oral at bedtime  azithromycin  IVPB      benzocaine 15 mG/menthol 3.6 mG (Sugar-Free) Lozenge 1 Lozenge Oral every 4 hours  cefTRIAXone   IVPB      dextrose 40% Gel 15 Gram(s) Oral once  dextrose 5%. 1000 milliLiter(s) (50 mL/Hr) IV Continuous <Continuous>  dextrose 5%. 1000 milliLiter(s) (100 mL/Hr) IV Continuous <Continuous>  dextrose 50% Injectable 25 Gram(s) IV Push once  dextrose 50% Injectable 12.5 Gram(s) IV Push once  dextrose 50% Injectable 25 Gram(s) IV Push once  donepezil 10 milliGRAM(s) Oral at bedtime  furosemide    Tablet 40 milliGRAM(s) Oral daily  glucagon  Injectable 1 milliGRAM(s) IntraMuscular once  insulin glargine Injectable (LANTUS) 9 Unit(s) SubCutaneous at bedtime  insulin lispro (ADMELOG) corrective regimen sliding scale   SubCutaneous three times a day before meals  insulin lispro Injectable (ADMELOG) 8 Unit(s) SubCutaneous three times a day before meals  lidocaine 2% Gel 1 Application(s) Topical three times a day  losartan 50 milliGRAM(s) Oral daily  metoprolol tartrate 12.5 milliGRAM(s) Oral two times a day  potassium chloride    Tablet ER 40 milliEquivalent(s) Oral once  potassium phosphate / sodium phosphate Tablet (K-PHOS No. 2) 1 Tablet(s) Oral three times a day      MEDICATIONS  (PRN):  acetaminophen   Tablet .. 650 milliGRAM(s) Oral every 6 hours PRN Mild Pain (1 - 3), Moderate Pain (4 - 6)  guaiFENesin   Syrup  (Sugar-Free) 100 milliGRAM(s) Oral every 6 hours PRN Cough       Medications up to date at time of exam.      PHYSICAL EXAMINATION:  Patient has no new complaints.  GENERAL: The patient is a well-developed, well-nourished, in no apparent distress.     Vital Signs Last 24 Hrs  T(C): 36.3 (2020 12:07), Max: 36.6 (2020 05:27)  T(F): 97.3 (2020 12:07), Max: 97.8 (2020 05:27)  HR: 105 (2020 12:07) (91 - 116)  BP: 112/59 (2020 12:07) (93/66 - 130/61)  BP(mean): --  RR: 20 (2020 12:07) (17 - 20)  SpO2: 94% (2020 12:07) (94% - 98%)   (if applicable)    Chest Tube (if applicable)    HEENT: Head is normocephalic and atraumatic. Extraocular muscles are intact. Mucous membranes are moist.     NECK: Supple, no palpable adenopathy.    LUNGS: Clear to auscultation, no wheezing, rales, or rhonchi.    HEART: Regular rate and rhythm without murmur.    ABDOMEN: Soft, nontender, and nondistended.  No hepatosplenomegaly is noted.    : No painful voiding, no pelvic pain    EXTREMITIES: Without any cyanosis, clubbing, rash, lesions or edema.    NEUROLOGIC: Awake,    SKIN: Warm, dry, good turgor.      LABS:                        14.0   15.25 )-----------( 283      ( 2020 10:24 )             43.4     11-23    137  |  96  |  13  ----------------------------<  218<H>  3.4<L>   |  33<H>  |  0.91    Ca    8.8      2020 17:11  Phos  2.3     11-  Mg     1.9     23                D-Dimer Assay, Quantitative: 201 ng/mL DDU (20 @ 10:24)            MICROBIOLOGY: (if applicable)    RADIOLOGY & ADDITIONAL STUDIES:  EKG:   CXR:  ECHO:    IMPRESSION: 80y Female PAST MEDICAL & SURGICAL HISTORY:  Afib    Dementia    HLD (hyperlipidemia)    HTN (hypertension)    DM (diabetes mellitus)    H/O:      p/w       IMP: This is a 80 yr old woman with  + second smoke exposure from late  ,uncontrol DM, HTN, HDL, vascular dementia, CAD, afib  admitted for acute hypoxic hypercapnic resp failure due to a combination of b/l PNA and heart failure. Covid-19 neg.    - Acute Hypoxic Hypercapnic Resp Failure  - PAN.. b/l.. CAP  - Pul edema  - Heart Failure  - DM -2 uncontrol   - HLD  - CAD/ Afib  - Moderate Pul HtN.        Sugg;    -Diuresis   -lasix 40 mg x 1 dose extra   -pat is afebrile and neg cultures but WBC trending up   -continue antibx for total 7 days (  last dose)  -O2 supp , keep sat>90%  -cultures neg   -blood sugar control   -dvt/gi prophy  -repeat CXR

## 2020-11-23 NOTE — CHART NOTE - NSCHARTNOTEFT_GEN_A_CORE
EVENT: Received telephone call from RN that pt is screaming out with Right leg pain    HPI: 81 yo F with HTN, HLD, DM, vascular dementia, and paroxysmal AF on Eliquis who presented with dyspnea and dry cough. Pt found to have patchy infiltrate of the right lung base on CXR,  Troponin  x 2 negative, mildly elevated Pro BNP 2012. Admitted for acute on chronic diastolic HF exacerbation and  pneumonia.    - Pt was seen at bedside. Pt still c/o of R leg pain which she rated a 8/10. Denied calf pain/tenderness, No relief with Tylenol stated pt.    OBJECTIVE:  Vital Signs Last 24 Hrs  T(C): 36.7 (23 Nov 2020 20:59), Max: 36.7 (23 Nov 2020 20:59)  T(F): 98.1 (23 Nov 2020 20:59), Max: 98.1 (23 Nov 2020 20:59)  HR: 128 (23 Nov 2020 20:59) (91 - 128)  BP: 101/71 (23 Nov 2020 20:59) (93/66 - 130/61)  BP(mean): --  RR: 19 (23 Nov 2020 20:59) (17 - 20)  SpO2: 100% (23 Nov 2020 20:59) (94% - 100%)    FOCUSED PHYSICAL EXAM:  Neuro: awake, alert, oriented x 3. No neuro deficit  Cardiovascular: Pulses +2 B/L in lower and upper extremities, HR regular, BP stable, No edema.  Respiratory: Respirations regular, unlabored, breath sounds clear B/L.   GI: Abdomen soft, non-tender, positive bowel sounds.  : no bladder distention noted. No complaints at this time.  Skin: Dry, intact, no bruising, no diaphoresis.    LABS:                        14.0   15.25 )-----------( 283      ( 23 Nov 2020 10:24 )             43.4     11-23    137  |  96  |  13  ----------------------------<  218<H>  3.4<L>   |  33<H>  |  0.91    Ca    8.8      23 Nov 2020 17:11  Phos  2.3     11-23  Mg     1.9     11-23        EKG:   IMAGING:      ASSESSMENT/Problem: Right leg pain      PLAN:   1. Dilaudid 0.5mg, IVP x 1 dose ordered  2. Cont present care/treatment  3. Monitor response to Dilaudid  4. Supportive care

## 2020-11-23 NOTE — PROGRESS NOTE ADULT - PROBLEM SELECTOR PLAN 1
Echo with EF >55%  Continue lasix 40 mg daily   CXR with small bilateral pleural effusions  CTA ordered (will r/o PE in setting of cancer, immobility, already on eliquis)   Dr Khanna pulmonary on board   continue Metoprolol 12.5mg PO BID  Fluid Restrictions <2000 daily  Strict I&O /Daily Weights  Cardiology Dr. Duque

## 2020-11-23 NOTE — PROGRESS NOTE ADULT - SUBJECTIVE AND OBJECTIVE BOX
HPI: Patient is a 80y old  Female who presents with a chief complaint of Acute hypoxic respiratory failure (19 Nov 2020 12:17)    INTERVAL HPI/OVERNIGHT EVENTS: pt sleepier today, uncooperative with staff. Reports that her left finger hurts. Does not respond when asked about other ROS.    MEDICATIONS  (STANDING):  amLODIPine   Tablet 5 milliGRAM(s) Oral daily  apixaban 5 milliGRAM(s) Oral two times a day  aspirin  chewable 81 milliGRAM(s) Oral daily  atorvastatin 40 milliGRAM(s) Oral at bedtime  azithromycin  IVPB 500 milliGRAM(s) IV Intermittent every 24 hours  benzocaine 15 mG/menthol 3.6 mG (Sugar-Free) Lozenge 1 Lozenge Oral every 4 hours  cefTRIAXone   IVPB 1000 milliGRAM(s) IV Intermittent every 24 hours  dextrose 40% Gel 15 Gram(s) Oral once  dextrose 5%. 1000 milliLiter(s) (50 mL/Hr) IV Continuous <Continuous>  dextrose 5%. 1000 milliLiter(s) (100 mL/Hr) IV Continuous <Continuous>  dextrose 50% Injectable 25 Gram(s) IV Push once  dextrose 50% Injectable 12.5 Gram(s) IV Push once  dextrose 50% Injectable 25 Gram(s) IV Push once  donepezil 10 milliGRAM(s) Oral at bedtime  furosemide    Tablet 40 milliGRAM(s) Oral daily  glucagon  Injectable 1 milliGRAM(s) IntraMuscular once  insulin glargine Injectable (LANTUS) 7 Unit(s) SubCutaneous at bedtime  insulin lispro (ADMELOG) corrective regimen sliding scale   SubCutaneous three times a day before meals  insulin lispro Injectable (ADMELOG) 5 Unit(s) SubCutaneous three times a day before meals  losartan 50 milliGRAM(s) Oral daily  melatonin 5 milliGRAM(s) Oral once  metoprolol tartrate 12.5 milliGRAM(s) Oral two times a day  potassium chloride   Powder 40 milliEquivalent(s) Oral once    MEDICATIONS  (PRN):  guaiFENesin   Syrup  (Sugar-Free) 100 milliGRAM(s) Oral every 6 hours PRN Cough    __________________________________________________    Vital Signs Last 24 Hrs  T(C): 36.3 (23 Nov 2020 12:07), Max: 36.6 (23 Nov 2020 05:27)  T(F): 97.3 (23 Nov 2020 12:07), Max: 97.8 (23 Nov 2020 05:27)  HR: 105 (23 Nov 2020 12:07) (91 - 122)  BP: 112/59 (23 Nov 2020 12:07) (93/66 - 131/54)  BP(mean): --  RR: 20 (23 Nov 2020 12:07) (17 - 20)  SpO2: 94% (23 Nov 2020 12:07) (94% - 98%)    ________________________________________________  PHYSICAL EXAM:  GENERAL: NAD, obese  female, appearing tired  CHEST/LUNG: decreased breath sounds B/L  HEART: S1 S2  irregular;   ABDOMEN: Soft, Nontender, Nondistended; Bowel sounds present  EXTREMITIES: + 1 LE  edema; no calf tenderness, microdactyly  SKIN: warm and dry; no rash  NERVOUS SYSTEM: no gross deficits  PSYCH: drowsy    ---  LABS personally reviewed: hypokalemic, leukocytosis worsening, hypophosphatemic               14.0   15.25 )-----------( 283      ( 23 Nov 2020 10:24 )             43.4     11-23    139  |  96  |  11  ----------------------------<  149<H>  2.7<LL>   |  37<H>  |  0.81  11-22    141  |  100  |  12  ----------------------------<  237<H>  3.3<L>   |  31  |  0.85    Ca    8.6      23 Nov 2020 10:24  Ca    9.0      22 Nov 2020 07:34  Phos  2.3     11-23  Mg     1.9     11-23      POCT Blood Glucose.: 124 mg/dL (23 Nov 2020 11:25)  POCT Blood Glucose.: 232 mg/dL (23 Nov 2020 07:47)  POCT Blood Glucose.: 154 mg/dL (22 Nov 2020 21:18)  POCT Blood Glucose.: 177 mg/dL (22 Nov 2020 17:09)    BCX/UCX: .Blood Blood  11-18-20   No Growth Final  --  --

## 2020-11-23 NOTE — PROGRESS NOTE ADULT - PROBLEM SELECTOR PLAN 4
recently diagnosed with renal cancer and has plans for surgery in [place]   CT Abdomen/Pelvis with IV contrast (10/3/2020) identified  right kidney demonstrates 2 heterogeneous lesions, measuring 2 cm in the upper pole and 4.8 cm in the lower pole concerning for renal cell carcinoma.  outpatient follow up recently diagnosed with renal cancer and has plans for surgery in [place]   CT Abdomen/Pelvis with IV contrast (10/3/2020) identified  right kidney demonstrates 2 heterogeneous lesions, measuring 2 cm in the upper pole and 4.8 cm in the lower pole concerning for renal cell carcinoma.  outpatient follow up  replenish lytes - K+ and phos low today

## 2020-11-24 NOTE — PHYSICAL THERAPY INITIAL EVALUATION ADULT - DIAGNOSIS, PT EVAL
impaired cognition, balance, strength, ROM. Additional diagnoses pending further assessment of Pt. functional mobility and ability to participate
Decline in functional mobility due to  weakness, decreased balance and endurance.

## 2020-11-24 NOTE — PHYSICAL THERAPY INITIAL EVALUATION ADULT - LIVES WITH, PROFILE
Per IE: Daughter in an apartment with elevator access; no stairs to negotiate to enter the building.
Daughter in an apartment with elevator access; no stairs to negotiate to enter the building.

## 2020-11-24 NOTE — CHART NOTE - NSCHARTNOTEFT_GEN_A_CORE
EVENT: Received telephone call from RN that pt is very agitated and screaming. Pt with Hx dementia.    Brief HPI: 79 yo F with HTN, HLD, DM, vascular dementia, and paroxysmal AF on Eliquis who presented with dyspnea and dry cough. Pt found to have patchy infiltrate of the right lung base on CXR,  Troponin  x 2 negative, mildly elevated Pro BNP 2012. Admitted for acute on chronic diastolic HF exacerbation and  pneumonia.      OBJECTIVE:  Vital Signs Last 24 Hrs  T(C): 36.6 (24 Nov 2020 20:51), Max: 36.8 (24 Nov 2020 12:45)  T(F): 97.8 (24 Nov 2020 20:51), Max: 98.3 (24 Nov 2020 12:45)  HR: 102 (24 Nov 2020 20:51) (82 - 126)  BP: 115/65 (24 Nov 2020 20:51) (108/71 - 137/69)  BP(mean): --  RR: 19 (24 Nov 2020 20:51) (18 - 20)  SpO2: 95% (24 Nov 2020 20:51) (93% - 97%)    FOCUSED PHYSICAL EXAM:  Neuro: awake, confused and agitated  Cardiovascular: Pulses +2 B/L in lower and upper extremities, HR regular, BP stable, No edema.  Respiratory: Respirations regular, unlabored, breath sounds clear B/L.   GI: Abdomen soft, non-tender, positive bowel sounds.  : no bladder distention noted. No complaints at this time.  Skin: Dry, intact, no bruising, no diaphoresis.    LABS:                        13.1   15.43 )-----------( 292      ( 24 Nov 2020 10:44 )             42.0     11-24    138  |  95<L>  |  13  ----------------------------<  97  3.1<L>   |  37<H>  |  0.83    Ca    8.7      24 Nov 2020 10:44  Phos  2.3     11-23  Mg     1.9     11-23        EKG:   IMAGING:      ASSESSMENT/Problem: Agitation probably 2/2 to dementia      PLAN:   1. Haldol 0.5mg, IM x 1 dose order  2. Re-orient pt  3. Cont present care/treatment  4. Supportive care

## 2020-11-24 NOTE — PHYSICAL THERAPY INITIAL EVALUATION ADULT - LEVEL OF INDEPENDENCE: SIT/STAND, REHAB EVAL
independent
Pt kept expressing wanting to go to commode, but then kept refusing attempt. Will reattempt as tolerated/pt allowing.

## 2020-11-24 NOTE — PHYSICAL THERAPY INITIAL EVALUATION ADULT - PERTINENT HX OF CURRENT PROBLEM, REHAB EVAL
PNA, SOB
ayala reviewed. Laboratory and Radiology results were also reviewed. Pt. admitted for rapidly progressive worsening of a non-productive cough along with shortness of breath.

## 2020-11-24 NOTE — PROGRESS NOTE ADULT - PROBLEM SELECTOR PLAN 10
pt did not f/u PT eval  daughter wants to take her back home when pt is medically optimized  currently on IV ABT for leukocytosis  will try to reattempt PT eval when xray result is available for Lt knee   v

## 2020-11-24 NOTE — PROGRESS NOTE ADULT - SUBJECTIVE AND OBJECTIVE BOX
Time of Visit:  Patient seen and examined.     MEDICATIONS  (STANDING):  amLODIPine   Tablet 5 milliGRAM(s) Oral daily  apixaban 5 milliGRAM(s) Oral two times a day  aspirin  chewable 81 milliGRAM(s) Oral daily  atorvastatin 40 milliGRAM(s) Oral at bedtime  azithromycin  IVPB 500 milliGRAM(s) IV Intermittent every 24 hours  azithromycin  IVPB      benzocaine 15 mG/menthol 3.6 mG (Sugar-Free) Lozenge 1 Lozenge Oral every 4 hours  cefTRIAXone   IVPB      cefTRIAXone   IVPB 1000 milliGRAM(s) IV Intermittent every 24 hours  dextrose 40% Gel 15 Gram(s) Oral once  dextrose 5%. 1000 milliLiter(s) (50 mL/Hr) IV Continuous <Continuous>  dextrose 5%. 1000 milliLiter(s) (100 mL/Hr) IV Continuous <Continuous>  dextrose 50% Injectable 25 Gram(s) IV Push once  dextrose 50% Injectable 12.5 Gram(s) IV Push once  dextrose 50% Injectable 25 Gram(s) IV Push once  donepezil 10 milliGRAM(s) Oral at bedtime  furosemide    Tablet 40 milliGRAM(s) Oral daily  glucagon  Injectable 1 milliGRAM(s) IntraMuscular once  ibuprofen  Tablet. 400 milliGRAM(s) Oral two times a day  insulin glargine Injectable (LANTUS) 9 Unit(s) SubCutaneous at bedtime  insulin lispro (ADMELOG) corrective regimen sliding scale   SubCutaneous three times a day before meals  insulin lispro Injectable (ADMELOG) 8 Unit(s) SubCutaneous three times a day before meals  lidocaine 2% Gel 1 Application(s) Topical three times a day  losartan 50 milliGRAM(s) Oral daily  metoprolol tartrate 12.5 milliGRAM(s) Oral two times a day  nystatin Powder 1 Application(s) Topical three times a day  potassium chloride    Tablet ER 40 milliEquivalent(s) Oral once      MEDICATIONS  (PRN):  acetaminophen   Tablet .. 650 milliGRAM(s) Oral every 6 hours PRN Mild Pain (1 - 3), Moderate Pain (4 - 6)  guaiFENesin   Syrup  (Sugar-Free) 100 milliGRAM(s) Oral every 6 hours PRN Cough       Medications up to date at time of exam.      PHYSICAL EXAMINATION:  Patient has no new complaints.  GENERAL: The patient is a well-developed, well-nourished, in no apparent distress.     Vital Signs Last 24 Hrs  T(C): 36.6 (2020 17:29), Max: 36.8 (2020 12:45)  T(F): 97.8 (2020 17:29), Max: 98.3 (2020 12:45)  HR: 118 (2020 17:29) (61 - 128)  BP: 137/69 (2020 17:29) (101/71 - 137/69)  BP(mean): --  RR: 18 (2020 17:29) (18 - 20)  SpO2: 96% (2020 17:29) (93% - 100%)   (if applicable)    Chest Tube (if applicable)    HEENT: Head is normocephalic and atraumatic. Extraocular muscles are intact. Mucous membranes are moist.     NECK: Supple, no palpable adenopathy.    LUNGS: Clear to auscultation, no wheezing, rales, or rhonchi.    HEART: Regular rate and rhythm without murmur.    ABDOMEN: Soft, nontender, and nondistended.  No hepatosplenomegaly is noted.    : No painful voiding, no pelvic pain    EXTREMITIES: Without any cyanosis, clubbing, rash, lesions or edema.    NEUROLOGIC: Awake, alert, oriented, grossly intact    SKIN: Warm, dry, good turgor.      LABS:                        13.1   15.43 )-----------( 292      ( 2020 10:44 )             42.0     11-24    138  |  95<L>  |  13  ----------------------------<  97  3.1<L>   |  37<H>  |  0.83    Ca    8.7      2020 10:44  Phos  2.3     11-23  Mg     1.9     11-23                          MICROBIOLOGY: (if applicable)    RADIOLOGY & ADDITIONAL STUDIES:  EKG:   CT chest:  < from: CT Chest No Cont (20 @ 11:27) >    EXAM:  CT CHEST                            PROCEDURE DATE:  2020          INTERPRETATION:  CLINICAL INDICATION: 80 years  Female with chf, pleural effusions, r/o pna.    COMPARISON: None.    PROCEDURE:  CT of the Chest was performed without intravenous contrast.  Sagittal and coronal reformats were performed.    Limitations: Motion artifact. Streak artifact from patient's adjacent arms. Absence of IV contrast limits evaluation of the vascular structures.    FINDINGS:    LUNGS AND AIRWAYS:Patent central airways.  Right lower lobe compressive atelectasis. Mild left lower lobe dependent atelectasis. Mild left lung volume loss.  PLEURA: Small right pleural effusion.  MEDIASTINUM AND TATI: No lymphadenopathy.  VESSELS: Atherosclerotic aorta without aneurysm. Low-attenuation blood pool secondary to anemia.  HEART: Mild cardiomegaly. Coronary atherosclerosis. No pericardial effusion.  CHEST WALL AND LOWER NECK: Within normal limits.  VISUALIZED UPPER ABDOMEN: Right upper pole renal mass.  BONES: Degenerative changes.    IMPRESSION:    Small right pleural effusion with underlying compressive atelectasis of lower lobe. Cannot exclude underlying pneumonia. Mild dependent atelectasis left lower lobe.    Cardiomegaly.    Right renal mass. Please refer to CT scan 10/3/2020.              AME MYRICK MD; Attending Radiologist  This document has been electronically signed. 2020  1:52PM    < end of copied text >  ECHO:    IMPRESSION: 80y Female PAST MEDICAL & SURGICAL HISTORY:  Afib    Dementia    HLD (hyperlipidemia)    HTN (hypertension)    DM (diabetes mellitus)    H/O:      p/w       IMP: This is a 80 yr old woman with  + second smoke exposure from late  ,uncontrol DM, HTN, HDL, vascular dementia, CAD, afib  admitted for acute hypoxic hypercapnic resp failure due to a combination of b/l PNA and heart failure. Covid-19 neg.    - Acute Hypoxic Hypercapnic Resp Failure  - PAN.. b/l.. CAP  - Pul edema  - Heart Failure  - DM -2 uncontrol   - HLD  - CAD/ Afib  - Moderate Pul HtN.        Sugg;    -Diuresis   -metabolic alkalosis , diamox 250 bid x 2 days  -lasix 40 mg x 1 dose extra   -pat is afebrile and neg cultures but WBC trending up   -continue antibx for total 7 days (  last dose)  -O2 supp , keep sat>90%  -cultures neg   -blood sugar control   -dvt/gi prophy  -CT chest report noted  d/c with NP

## 2020-11-24 NOTE — PHYSICAL THERAPY INITIAL EVALUATION ADULT - GROSSLY INTACT, SENSORY
Highly limited secondary to imapired ability to follow commands, but grossly responds to touch
Grossly Intact

## 2020-11-24 NOTE — PROGRESS NOTE ADULT - PROBLEM SELECTOR PLAN 1
completed 5days of zithromax and ceftriaxone   on 11/23 her WBC elevated, resume zithromax and ceftriaxone again total D7   ID Dr. Gallardo consulted  CT chest was performed - small Rt pleural effusion with possible pneumonia   no respiratory distress noted  monitor cbc im AM

## 2020-11-24 NOTE — DIETITIAN INITIAL EVALUATION ADULT. - PERTINENT MEDS FT
MEDICATIONS  (STANDING):  amLODIPine   Tablet 5 milliGRAM(s) Oral daily  apixaban 5 milliGRAM(s) Oral two times a day  aspirin  chewable 81 milliGRAM(s) Oral daily  atorvastatin 40 milliGRAM(s) Oral at bedtime  azithromycin  IVPB 500 milliGRAM(s) IV Intermittent every 24 hours  azithromycin  IVPB      benzocaine 15 mG/menthol 3.6 mG (Sugar-Free) Lozenge 1 Lozenge Oral every 4 hours  cefTRIAXone   IVPB      cefTRIAXone   IVPB 1000 milliGRAM(s) IV Intermittent every 24 hours  dextrose 40% Gel 15 Gram(s) Oral once  dextrose 5%. 1000 milliLiter(s) (50 mL/Hr) IV Continuous <Continuous>  dextrose 5%. 1000 milliLiter(s) (100 mL/Hr) IV Continuous <Continuous>  dextrose 50% Injectable 25 Gram(s) IV Push once  dextrose 50% Injectable 12.5 Gram(s) IV Push once  dextrose 50% Injectable 25 Gram(s) IV Push once  donepezil 10 milliGRAM(s) Oral at bedtime  furosemide    Tablet 40 milliGRAM(s) Oral daily  glucagon  Injectable 1 milliGRAM(s) IntraMuscular once  insulin glargine Injectable (LANTUS) 9 Unit(s) SubCutaneous at bedtime  insulin lispro (ADMELOG) corrective regimen sliding scale   SubCutaneous three times a day before meals  insulin lispro Injectable (ADMELOG) 8 Unit(s) SubCutaneous three times a day before meals  lidocaine 2% Gel 1 Application(s) Topical three times a day  losartan 50 milliGRAM(s) Oral daily  metoprolol tartrate 12.5 milliGRAM(s) Oral two times a day  nystatin Powder 1 Application(s) Topical three times a day  potassium chloride    Tablet ER 40 milliEquivalent(s) Oral once    MEDICATIONS  (PRN):  acetaminophen   Tablet .. 650 milliGRAM(s) Oral every 6 hours PRN Mild Pain (1 - 3), Moderate Pain (4 - 6)  acetaminophen   Tablet .. 650 milliGRAM(s) Oral every 6 hours PRN Mild Pain (1 - 3), Moderate Pain (4 - 6)  guaiFENesin   Syrup  (Sugar-Free) 100 milliGRAM(s) Oral every 6 hours PRN Cough

## 2020-11-24 NOTE — PROGRESS NOTE ADULT - PROBLEM SELECTOR PLAN 5
Recently diagnosed with renal cancer and has plans for surgery in [place]   CT Abdomen/Pelvis with IV contrast findings concerning for renal cell carcinoma.

## 2020-11-24 NOTE — DIETITIAN INITIAL EVALUATION ADULT. - PERTINENT LABORATORY DATA
11-23 Na137 mmol/L Glu 218 mg/dL<H> K+ 3.4 mmol/L<L> Cr  0.91 mg/dL BUN 13 mg/dL   11-23 Phos 2.3 mg/dL<L>   11-18 Alb 3.3 g/dL<L>       11-18 Chol 166 mg/dL LDL --    HDL 60 mg/dL Trig 89 mg/dL  11-18-20 @ 09:33 HgbA1C 8.9 [4.0 - 5.6]

## 2020-11-24 NOTE — PHYSICAL THERAPY INITIAL EVALUATION ADULT - PLANNED THERAPY INTERVENTIONS, PT EVAL
neuromuscular re-education/postural re-education/ROM/balance training/bed mobility training/gait training/strengthening/transfer training
balance training/gait training/strengthening

## 2020-11-24 NOTE — PHYSICAL THERAPY INITIAL EVALUATION ADULT - PHYSICAL ASSIST/NONPHYSICAL ASSIST: SIT/SUPINE, REHAB EVAL
1 person assist/verbal cues
3 persons. PT, PT student and NP. Pt not wanting to stand/transfer, not wanting to lay down, but high fall risk at EOB. NP in agreement for return pt to be secondary to safey concern/risk of injury.

## 2020-11-24 NOTE — PROGRESS NOTE ADULT - ASSESSMENT
BL Pneumonia   Acute on chronic diastolic heart failure   Pulmonary HTN  Metabolic alkalosis   ?JOSE G  Afib  Renal mass   DM  HTN  HLD  Vascular Dementia, TIA in March 2020    Plan:  CT chest noted  Cont Ceftriaxone and Azithromycin   Lasix one dose today, cont with daily dose   Trend WBC   Cont Eliquis 5mg PO BID  Cont Losartan, Metoprolol and Amlodipine  Metabolic alkalosis possibly due to diuretic use vs undiagnosed JOSE G  Cont current management for DM, HTN and HLD  Knee Xray  PT eval  Full code   Spoke with patient's daughter. Patient has dementia, needs assistance for her ADLs, walks with a walker at home. Has HHA 5hrs/day. Wants to take patient home and patient needs repeated explanation for any medication or intervention for her dementia  Daughter reported patient had acute onset of slurring of speech while talking at this moment. Will evaluate ASAP. BL Pneumonia   Acute on chronic diastolic heart failure   Pulmonary HTN  Metabolic alkalosis   ?JOSE G  Afib  Renal mass   DM  HTN  HLD  Vascular Dementia, TIA in March 2020    Plan:  CT chest noted  Cont Ceftriaxone and Azithromycin   Lasix one dose today, cont with daily dose   Trend WBC   Cont Eliquis 5mg PO BID  Cont Losartan, Metoprolol and Amlodipine  Metabolic alkalosis possibly due to diuretic use vs undiagnosed JOSE G  Cont current management for DM, HTN and HLD  Knee Xray  PT eval  Full code   Spoke with patient's daughter. Patient has dementia, needs assistance for her ADLs, walks with a walker at home. Has HHA 5hrs/day. Wants to take patient home and patient needs repeated explanation for any medication or intervention for her dementia  Daughter reported patient had acute onset of slurring of speech while talking, was evaluated by NP at bedside, no focal deficit, speaking in full sentence without any difficulty

## 2020-11-24 NOTE — PHYSICAL THERAPY INITIAL EVALUATION ADULT - IMPAIRMENTS FOUND, PT EVAL
ROM/cognitive impairment/gait, locomotion, and balance/muscle strength/poor safety awareness
gross motor/gait, locomotion, and balance/aerobic capacity/endurance/muscle strength

## 2020-11-24 NOTE — CHART NOTE - NSCHARTNOTEFT_GEN_A_CORE
EVENT: Received telephone call from RN that pt has perineal rash    HPI: 79 yo F with HTN, HLD, DM, vascular dementia, and paroxysmal AF on Eliquis who presented with dyspnea and dry cough. Pt found to have patchy infiltrate of the right lung base on CXR,  Troponin  x 2 negative, mildly elevated Pro BNP 2012. Admitted for acute on chronic diastolic HF exacerbation and  pneumonia.    - Pt examined at bedside. + perineal rash noted.    OBJECTIVE:  Vital Signs Last 24 Hrs  T(C): 36.3 (24 Nov 2020 05:04), Max: 36.7 (23 Nov 2020 20:59)  T(F): 97.3 (24 Nov 2020 05:04), Max: 98.1 (23 Nov 2020 20:59)  HR: 90 (24 Nov 2020 05:04) (61 - 128)  BP: 113/70 (24 Nov 2020 05:04) (101/71 - 125/62)  BP(mean): --  RR: 18 (24 Nov 2020 05:04) (18 - 20)  SpO2: 94% (24 Nov 2020 05:04) (94% - 100%)    FOCUSED PHYSICAL EXAM:  Neuro: awake, alert, confused  Cardiovascular: Pulses +2 B/L in lower and upper extremities, HR regular, BP stable, No edema.  Respiratory: Respirations regular, unlabored, breath sounds clear B/L.   GI: Abdomen soft, non-tender, positive bowel sounds.  : no bladder distention noted. No complaints at this time.  Skin: Dry, intact, no bruising, no diaphoresis.    LABS:                        14.0   15.25 )-----------( 283      ( 23 Nov 2020 10:24 )             43.4     11-23    137  |  96  |  13  ----------------------------<  218<H>  3.4<L>   |  33<H>  |  0.91    Ca    8.8      23 Nov 2020 17:11  Phos  2.3     11-23  Mg     1.9     11-23        EKG:   IMAGING:      ASSESSMENT/Problem: Perineal rash 2/2 to pt being incontinent of urine      PLAN:   1. Nystatin powder order  2. Cont good nursing care  3. Cont present care/treatment

## 2020-11-24 NOTE — PHYSICAL THERAPY INITIAL EVALUATION ADULT - ACTIVE RANGE OF MOTION EXAMINATION, REHAB EVAL
Highly limited secondary to impaired ability/willingness to follow commands. At least 1/3 range gravity emiminated

## 2020-11-24 NOTE — PROGRESS NOTE ADULT - SUBJECTIVE AND OBJECTIVE BOX
NP Note discussed with  Primary Attending    Patient is a 80y old  Female who presents with a chief complaint of Acute hypoxic respiratory failure (24 Nov 2020 10:39)      HPI - 81 yo F with HTN, HLD, DM, vascular dementia, and paroxysmal AF on Eliquis who presented with dyspnea and dry cough. Pt found to have patchy infiltrate of the right lung base on CXR,  Troponin  x 2 negative, mildly elevated Pro BNP 2012. Admitted for acute on chronic diastolic HF exacerbation and  pneumonia, treated  with IV diuretics now transitioned to PO. Also treated for PNA with antibiotics. Pt with worsening leukocytosis on 11/23 Pneumonia  antibiotics restarted.     seen and examined pt multiple times today. Pt was easily agitated, refused care and medications then every time daughter spoke with her she allowed intervention.   c/o Lt knee let pain, painly knee pain, xray pending. breathing well on RA.     GOC : full code       INTERVAL HPI/OVERNIGHT EVENTS: Left knee pain     MEDICATIONS  (STANDING):  amLODIPine   Tablet 5 milliGRAM(s) Oral daily  apixaban 5 milliGRAM(s) Oral two times a day  aspirin  chewable 81 milliGRAM(s) Oral daily  atorvastatin 40 milliGRAM(s) Oral at bedtime  azithromycin  IVPB 500 milliGRAM(s) IV Intermittent every 24 hours  azithromycin  IVPB      benzocaine 15 mG/menthol 3.6 mG (Sugar-Free) Lozenge 1 Lozenge Oral every 4 hours  cefTRIAXone   IVPB      cefTRIAXone   IVPB 1000 milliGRAM(s) IV Intermittent every 24 hours  dextrose 40% Gel 15 Gram(s) Oral once  dextrose 5%. 1000 milliLiter(s) (50 mL/Hr) IV Continuous <Continuous>  dextrose 5%. 1000 milliLiter(s) (100 mL/Hr) IV Continuous <Continuous>  dextrose 50% Injectable 25 Gram(s) IV Push once  dextrose 50% Injectable 12.5 Gram(s) IV Push once  dextrose 50% Injectable 25 Gram(s) IV Push once  donepezil 10 milliGRAM(s) Oral at bedtime  furosemide    Tablet 40 milliGRAM(s) Oral daily  glucagon  Injectable 1 milliGRAM(s) IntraMuscular once  insulin glargine Injectable (LANTUS) 9 Unit(s) SubCutaneous at bedtime  insulin lispro (ADMELOG) corrective regimen sliding scale   SubCutaneous three times a day before meals  insulin lispro Injectable (ADMELOG) 8 Unit(s) SubCutaneous three times a day before meals  lidocaine 2% Gel 1 Application(s) Topical three times a day  losartan 50 milliGRAM(s) Oral daily  metoprolol tartrate 12.5 milliGRAM(s) Oral two times a day  nystatin Powder 1 Application(s) Topical three times a day  potassium chloride    Tablet ER 40 milliEquivalent(s) Oral every 4 hours  potassium chloride    Tablet ER 40 milliEquivalent(s) Oral once    MEDICATIONS  (PRN):  acetaminophen   Tablet .. 650 milliGRAM(s) Oral every 6 hours PRN Mild Pain (1 - 3), Moderate Pain (4 - 6)  acetaminophen   Tablet .. 650 milliGRAM(s) Oral every 6 hours PRN Mild Pain (1 - 3), Moderate Pain (4 - 6)  guaiFENesin   Syrup  (Sugar-Free) 100 milliGRAM(s) Oral every 6 hours PRN Cough      __________________________________________________  REVIEW OF SYSTEMS:    CONSTITUTIONAL: No fever,   EYES: no acute visual disturbances  NECK: No pain or stiffness  RESPIRATORY: No cough; No shortness of breath  CARDIOVASCULAR: No chest pain, no palpitations  GASTROINTESTINAL: No pain. No nausea or vomiting; No diarrhea   NEUROLOGICAL: No headache or numbness, no tremors  MUSCULOSKELETAL: Left leg pain including knee   GENITOURINARY: no dysuria, no frequency, no hesitancy  PSYCHIATRY: no depression , no anxiety  ALL OTHER  ROS negative        Vital Signs Last 24 Hrs  T(C): 36.8 (24 Nov 2020 12:45), Max: 36.8 (24 Nov 2020 12:45)  T(F): 98.3 (24 Nov 2020 12:45), Max: 98.3 (24 Nov 2020 12:45)  HR: 124 (24 Nov 2020 12:45) (61 - 128)  BP: 112/65 (24 Nov 2020 12:45) (101/71 - 113/70)  BP(mean): --  RR: 20 (24 Nov 2020 12:45) (18 - 20)  SpO2: 94% (24 Nov 2020 12:45) (93% - 100%)    ________________________________________________  PHYSICAL EXAM:  GENERAL: NAD  HEENT: Normocephalic;  conjunctivae and sclerae clear; moist mucous membranes;   NECK : supple  CHEST/LUNG: Clear to auscultation bilaterally with good air entry   HEART: S1 S2  regular; no murmurs, gallops or rubs  ABDOMEN: Soft, Nontender, Nondistended; Bowel sounds present  EXTREMITIES: no cyanosis; no edema; no calf tenderness, deformity to Rt fingers   SKIN: warm and dry; no rash  NERVOUS SYSTEM:  Awake and alert; Oriented  to place, person  ; no new deficits    _________________________________________________  LABS:                        13.1   15.43 )-----------( 292      ( 24 Nov 2020 10:44 )             42.0     11-24    138  |  95<L>  |  13  ----------------------------<  97  3.1<L>   |  37<H>  |  0.83    Ca    8.7      24 Nov 2020 10:44  Phos  2.3     11-23  Mg     1.9     11-23          CAPILLARY BLOOD GLUCOSE      POCT Blood Glucose.: 120 mg/dL (24 Nov 2020 12:22)  POCT Blood Glucose.: 223 mg/dL (24 Nov 2020 07:38)  POCT Blood Glucose.: 203 mg/dL (23 Nov 2020 21:38)  POCT Blood Glucose.: 310 mg/dL (23 Nov 2020 17:08)        RADIOLOGY & ADDITIONAL TESTS:  < from: Xray Chest 1 View- PORTABLE-Urgent (Xray Chest 1 View- PORTABLE-Urgent .) (11.20.20 @ 16:52) >    EXAM:  XR CHEST PORTABLE URGENT 1V                            PROCEDURE DATE:  11/20/2020          INTERPRETATION:  CLINICAL STATEMENT: Follow-up chest pain.    TECHNIQUE: AP view of the chest.    COMPARISON: 11/17/2020    FINDINGS/  IMPRESSION:  Worsening airspace opacity right lung base. Follow-up recommended.    Probable small bilateral pleural effusions.x    Heart size cannot be accurately assessed in this projection.              JET LAMB MD; Attending Radiologist  This document has beenelectronically signed. Nov 20 2020  5:41PM    < end of copied text >  < from: CT Chest No Cont (11.24.20 @ 11:27) >    EXAM:  CT CHEST                            PROCEDURE DATE:  11/24/2020          INTERPRETATION:  CLINICAL INDICATION: 80 years  Female with chf, pleural effusions, r/o pna.    COMPARISON: None.    PROCEDURE:  CT of the Chest was performed without intravenous contrast.  Sagittal and coronal reformats were performed.    Limitations: Motion artifact. Streak artifact from patient's adjacent arms. Absence of IV contrast limits evaluation of the vascular structures.    FINDINGS:    LUNGS AND AIRWAYS:Patent central airways.  Right lower lobe compressive atelectasis. Mild left lower lobe dependent atelectasis. Mild left lung volume loss.  PLEURA: Small right pleural effusion.  MEDIASTINUM AND TATI: No lymphadenopathy.  VESSELS: Atherosclerotic aorta without aneurysm. Low-attenuation blood pool secondary to anemia.  HEART: Mild cardiomegaly. Coronary atherosclerosis. No pericardial effusion.  CHEST WALL AND LOWER NECK: Within normal limits.  VISUALIZED UPPER ABDOMEN: Right upper pole renal mass.  BONES: Degenerative changes.    IMPRESSION:    Small right pleural effusion with underlying compressive atelectasis of lower lobe. Cannot exclude underlying pneumonia. Mild dependent atelectasis left lower lobe.    Cardiomegaly.    Right renal mass. Please refer to CT scan 10/3/2020.              AME MYRICK MD; Attending Radiologist  This document has been electronically signed. Nov 24 2020  1:52PM    < end of copied text >    Imaging Personally Reviewed:  YES    Consultant(s) Notes Reviewed:   YES    Care Discussed with Consultants : ID/ cardiology     Plan of care was discussed with patient and /or primary care giver; all questions and concerns were addressed and care was aligned with patient's wishes.

## 2020-11-24 NOTE — DIETITIAN INITIAL EVALUATION ADULT. - OTHER INFO
Pt visited. Pt seen for  Pt is alert but  confused. Unable to obtain any information.  D/W RN pt eating fair with encouragement. Pt is obese. BMI 34. Pt admitted with Pneumonia , CRI, CHF. On  1500 ml fluid restriction.

## 2020-11-24 NOTE — PHYSICAL THERAPY INITIAL EVALUATION ADULT - GENERAL OBSERVATIONS, REHAB EVAL
Pt. received supine in bed, NAD.
Pt. received supine in bed, NAD, cooperative and motivated during eval. Bilateral feet swollen. Pt. A&O x 1-2; confused but able to follow single step commands.

## 2020-11-24 NOTE — PHYSICAL THERAPY INITIAL EVALUATION ADULT - NS ASR RISK AREAS PT EVAL
safety awareness/impaired judgment/cognitive impairment/fall
impaired judgment/safety awareness/cognitive impairment

## 2020-11-24 NOTE — PHYSICAL THERAPY INITIAL EVALUATION ADULT - PATIENT/FAMILY/SIGNIFICANT OTHER GOALS STATEMENT, PT EVAL
wants to sit up and go to commode at this time, and participate in PT. Wants to go home,
Unable to express due to cognition.

## 2020-11-24 NOTE — PHYSICAL THERAPY INITIAL EVALUATION ADULT - PASSIVE RANGE OF MOTION EXAMINATION, REHAB EVAL
bilateral upper extremity Passive ROM was WFL (within functional limits)/bilateral lower extremity Passive ROM was WFL (within functional limits)/Highly limited secondary to impaired ability/willingness to follow commands. except shoulders at least 1/3 range. Ankles ~ neutral.

## 2020-11-24 NOTE — PHYSICAL THERAPY INITIAL EVALUATION ADULT - LEVEL OF INDEPENDENCE: BED TO CHAIR, REHAB EVAL
Pt kept expressing wanting to go to commode, but then kept refusing attempt. Will reattempt as tolerated/pt allowing.

## 2020-11-24 NOTE — PROGRESS NOTE ADULT - SUBJECTIVE AND OBJECTIVE BOX
Patient is a 80y old  Female who presents with a chief complaint of Acute hypoxic respiratory failure (24 Nov 2020 16:07)    Patient was seen and examined at bedside   Very agitated, complains of left knee pain   Does not want to talk about her treatment plan, wants us to discuss with her daughter     INTERVAL HPI/OVERNIGHT EVENTS:  T(C): 36.8 (11-24-20 @ 12:45), Max: 36.8 (11-24-20 @ 12:45)  HR: 124 (11-24-20 @ 12:45) (61 - 128)  BP: 112/65 (11-24-20 @ 12:45) (101/71 - 113/70)  RR: 20 (11-24-20 @ 12:45) (18 - 20)  SpO2: 94% (11-24-20 @ 12:45) (93% - 100%)  Wt(kg): --  I&O's Summary      REVIEW OF SYSTEMS: denies fever, chills, SOB, palpitations, chest pain, abdominal pain, nausea, vomiting, diarrhea, constipation, dizziness    MEDICATIONS  (STANDING):  amLODIPine   Tablet 5 milliGRAM(s) Oral daily  apixaban 5 milliGRAM(s) Oral two times a day  aspirin  chewable 81 milliGRAM(s) Oral daily  atorvastatin 40 milliGRAM(s) Oral at bedtime  azithromycin  IVPB 500 milliGRAM(s) IV Intermittent every 24 hours  azithromycin  IVPB      benzocaine 15 mG/menthol 3.6 mG (Sugar-Free) Lozenge 1 Lozenge Oral every 4 hours  cefTRIAXone   IVPB      cefTRIAXone   IVPB 1000 milliGRAM(s) IV Intermittent every 24 hours  dextrose 40% Gel 15 Gram(s) Oral once  dextrose 5%. 1000 milliLiter(s) (50 mL/Hr) IV Continuous <Continuous>  dextrose 5%. 1000 milliLiter(s) (100 mL/Hr) IV Continuous <Continuous>  dextrose 50% Injectable 25 Gram(s) IV Push once  dextrose 50% Injectable 12.5 Gram(s) IV Push once  dextrose 50% Injectable 25 Gram(s) IV Push once  donepezil 10 milliGRAM(s) Oral at bedtime  furosemide    Tablet 40 milliGRAM(s) Oral once  furosemide    Tablet 40 milliGRAM(s) Oral daily  glucagon  Injectable 1 milliGRAM(s) IntraMuscular once  insulin glargine Injectable (LANTUS) 9 Unit(s) SubCutaneous at bedtime  insulin lispro (ADMELOG) corrective regimen sliding scale   SubCutaneous three times a day before meals  insulin lispro Injectable (ADMELOG) 8 Unit(s) SubCutaneous three times a day before meals  lidocaine 2% Gel 1 Application(s) Topical three times a day  losartan 50 milliGRAM(s) Oral daily  metoprolol tartrate 12.5 milliGRAM(s) Oral two times a day  nystatin Powder 1 Application(s) Topical three times a day  potassium chloride    Tablet ER 40 milliEquivalent(s) Oral every 4 hours  potassium chloride    Tablet ER 40 milliEquivalent(s) Oral once    MEDICATIONS  (PRN):  acetaminophen   Tablet .. 650 milliGRAM(s) Oral every 6 hours PRN Mild Pain (1 - 3), Moderate Pain (4 - 6)  acetaminophen   Tablet .. 650 milliGRAM(s) Oral every 6 hours PRN Mild Pain (1 - 3), Moderate Pain (4 - 6)  guaiFENesin   Syrup  (Sugar-Free) 100 milliGRAM(s) Oral every 6 hours PRN Cough      PHYSICAL EXAM:  GENERAL: agitated, obese  NERVOUS SYSTEM:  Alert & Oriented X2, no focal deficit noted   CHEST/LUNG: Clear to auscultation anterolaterally  HEART: Regular rate and rhythm; No murmurs, rubs, or gallops  ABDOMEN: Soft, Nontender, Nondistended; Bowel sounds present  EXTREMITIES: left knee tender, no erythema or warmth, 2+ Peripheral Pulses, No clubbing, cyanosis, or edema  SKIN: No rashes or lesions    LABS:                        13.1   15.43 )-----------( 292      ( 24 Nov 2020 10:44 )             42.0     138  |  95<L>  |  13  ----------------------------<  97  3.1<L>   |  37<H>  |  0.83    Ca    8.7      24 Nov 2020 10:44  Phos  2.3     11-23  Mg     1.9     11-23          CAPILLARY BLOOD GLUCOSE      POCT Blood Glucose.: 120 mg/dL (24 Nov 2020 12:22)  POCT Blood Glucose.: 223 mg/dL (24 Nov 2020 07:38)  POCT Blood Glucose.: 203 mg/dL (23 Nov 2020 21:38)  POCT Blood Glucose.: 310 mg/dL (23 Nov 2020 17:08)

## 2020-11-24 NOTE — PHYSICAL THERAPY INITIAL EVALUATION ADULT - LEVEL OF INDEPENDENCE: GAIT, REHAB EVAL
Pt kept expressing wanting to go to commode, but then kept refusing attempt. Will reattempt as tolerated/pt allowing.
contact guard

## 2020-11-24 NOTE — PHYSICAL THERAPY INITIAL EVALUATION ADULT - SKIN COLOR/CHARACTERISTICS
at exposed areas. b/l knee and lower leg edema L>R; no redness. only minor warmth at b/l knees (NP came to examine)

## 2020-11-24 NOTE — PHYSICAL THERAPY INITIAL EVALUATION ADULT - CRITERIA FOR SKILLED THERAPEUTIC INTERVENTIONS
functional limitations in following categories/impairments found/risk reduction/prevention/rehab potential/therapy frequency/predicted duration of therapy intervention/anticipated discharge recommendation
risk reduction/prevention/functional limitations in following categories/impairments found/therapy frequency/rehab potential/anticipated discharge recommendation/predicted duration of therapy intervention

## 2020-11-25 NOTE — PROGRESS NOTE ADULT - PROBLEM SELECTOR PLAN 6
Continue amlodipine ,losartan metoprolol 12.5mg PO BID.
C/w amlodipine, losartan, BB
Hemoglobin A1c= 9.7% on 3/11/2020  f/u Hemoglobin A1c  continue Blood Glucose ACHS  Sliding Scale ACHS
Hemoglobin A1c= 9.7% on 3/11/2020 blood sugar elevated   continue Blood Glucose ACHS  Sliding Scale ACHS  lantus 5 units at bedtime - will increase given elevated FSBG today
Hemoglobin A1c= 9.7% on 3/11/2020 blood sugar elevated   continue Blood Glucose ACHS  Sliding Scale ACHS  lantus 9  c/w prandial insulin - may have to increase tomorrow if still elevated at 5 TID
Hemoglobin A1c= 9.7% on 3/11/2020 blood sugar elevated   continue Blood Glucose ACHS  Sliding Scale ACHS  lantus will increase to 9 today for high FSBG in AM  c/w prandial insulin 5 tid
hx of vascular dementia  Continue donepezil 10 mg Oral at bedtime
hx of vascular dementia  continue  home meds  donepezil 10 mg Oral at bedtime
Hemoglobin A1c 8.9  C/w HSS  Started on Lispro 3 units before meals

## 2020-11-25 NOTE — PROGRESS NOTE ADULT - REASON FOR ADMISSION
Acute hypoxic respiratory failure

## 2020-11-25 NOTE — PROGRESS NOTE ADULT - SUBJECTIVE AND OBJECTIVE BOX
Patient is a 80y old  Female who presents with a chief complaint of Acute hypoxic respiratory failure (24 Nov 2020 20:27)    INTERVAL HPI/OVERNIGHT EVENTS: no new complaints    REVIEW OF SYSTEMS:  CONSTITUTIONAL: No fever, chills  ENMT:  No difficulty hearing, no change in vision  NECK: No pain or stiffness  RESPIRATORY: No cough, SOB  CARDIOVASCULAR: No chest pain, palpitations  GASTROINTESTINAL: No abdominal pain. No nausea, vomiting, or diarrhea  GENITOURINARY: No dysuria  NEUROLOGICAL: No HA  SKIN: No itching, burning, rashes, or lesions   LYMPH NODES: No enlarged glands  MUSCULOSKELETAL: No joint pain or swelling; No muscle, back, or extremity pain  PSYCHIATRIC: No depression, anxiety    T(C): 36.3 (11-24-20 @ 23:52), Max: 36.8 (11-24-20 @ 12:45)  HR: 77 (11-25-20 @ 05:03) (77 - 124)  BP: 118/81 (11-25-20 @ 05:03) (109/86 - 137/69)  RR: 18 (11-24-20 @ 23:52) (18 - 20)  SpO2: 95% (11-24-20 @ 23:52) (94% - 96%)  Wt(kg): --Vital Signs Last 24 Hrs  T(C): 36.3 (24 Nov 2020 23:52), Max: 36.8 (24 Nov 2020 12:45)  T(F): 97.3 (24 Nov 2020 23:52), Max: 98.3 (24 Nov 2020 12:45)  HR: 77 (25 Nov 2020 05:03) (77 - 124)  BP: 118/81 (25 Nov 2020 05:03) (109/86 - 137/69)  BP(mean): --  RR: 18 (24 Nov 2020 23:52) (18 - 20)  SpO2: 95% (24 Nov 2020 23:52) (94% - 96%)    MEDICATIONS  (STANDING):  amLODIPine   Tablet 5 milliGRAM(s) Oral daily  apixaban 5 milliGRAM(s) Oral two times a day  aspirin  chewable 81 milliGRAM(s) Oral daily  atorvastatin 40 milliGRAM(s) Oral at bedtime  azithromycin  IVPB      azithromycin  IVPB 500 milliGRAM(s) IV Intermittent every 24 hours  benzocaine 15 mG/menthol 3.6 mG (Sugar-Free) Lozenge 1 Lozenge Oral every 4 hours  cefTRIAXone   IVPB 1000 milliGRAM(s) IV Intermittent every 24 hours  cefTRIAXone   IVPB      dextrose 40% Gel 15 Gram(s) Oral once  dextrose 5%. 1000 milliLiter(s) (50 mL/Hr) IV Continuous <Continuous>  dextrose 5%. 1000 milliLiter(s) (100 mL/Hr) IV Continuous <Continuous>  dextrose 50% Injectable 25 Gram(s) IV Push once  dextrose 50% Injectable 12.5 Gram(s) IV Push once  dextrose 50% Injectable 25 Gram(s) IV Push once  donepezil 10 milliGRAM(s) Oral at bedtime  furosemide    Tablet 40 milliGRAM(s) Oral daily  glucagon  Injectable 1 milliGRAM(s) IntraMuscular once  insulin glargine Injectable (LANTUS) 9 Unit(s) SubCutaneous at bedtime  insulin lispro (ADMELOG) corrective regimen sliding scale   SubCutaneous three times a day before meals  insulin lispro Injectable (ADMELOG) 8 Unit(s) SubCutaneous three times a day before meals  lidocaine 2% Gel 1 Application(s) Topical three times a day  losartan 50 milliGRAM(s) Oral daily  metoprolol tartrate 12.5 milliGRAM(s) Oral two times a day  nystatin Powder 1 Application(s) Topical three times a day    MEDICATIONS  (PRN):  acetaminophen   Tablet .. 650 milliGRAM(s) Oral every 6 hours PRN Mild Pain (1 - 3), Moderate Pain (4 - 6)  guaiFENesin   Syrup  (Sugar-Free) 100 milliGRAM(s) Oral every 6 hours PRN Cough    PHYSICAL EXAM:  GENERAL: NAD  EYES: clear conjunctiva; EOMI  ENMT: Moist mucous membranes  NECK: Supple, No JVD, Normal thyroid  CHEST/LUNG: mild crackles noted in lower base bilat  HEART: S1, S2, Regular rate and rhythm  ABDOMEN: Soft, Nontender, Nondistended; Bowel sounds present  NEURO: Alert & Oriented X3  EXTREMITIES: No LE edema, no calf tenderness  LYMPH: No lymphadenopathy noted  SKIN: No rashes or lesions    Consultant(s) Notes Reviewed:  [x ] YES  [ ] NO  Care Discussed with Consultants/Other Providers [ x] YES  [ ] NO    LABS:                        13.5   12.41 )-----------( 270      ( 25 Nov 2020 06:40 )             42.8     11-25    139  |  100  |  15  ----------------------------<  123<H>  4.5   |  31  |  0.70    Ca    8.6      25 Nov 2020 06:40    TPro  6.7  /  Alb  2.3<L>  /  TBili  0.9  /  DBili  x   /  AST  17  /  ALT  10  /  AlkPhos  76  11-25      CAPILLARY BLOOD GLUCOSE  POCT Blood Glucose.: 165 mg/dL (25 Nov 2020 11:26)  POCT Blood Glucose.: 214 mg/dL (25 Nov 2020 07:43)  POCT Blood Glucose.: 129 mg/dL (25 Nov 2020 05:58)  POCT Blood Glucose.: 154 mg/dL (25 Nov 2020 00:03)  POCT Blood Glucose.: 92 mg/dL (24 Nov 2020 22:36)  POCT Blood Glucose.: 111 mg/dL (24 Nov 2020 17:08)    RADIOLOGY & ADDITIONAL TESTS:    Imaging Personally Reviewed:  [x ] YES  [ ] NO    from: Xray Knee 3 Views, Left (11.25.20 @ 09:30)   EXAM:  KNEE AP LAT&OBL. LEFT                            PROCEDURE DATE:  11/25/2020      INTERPRETATION:  Left knee x-rays    Indication: Left knee pain.    3 views of the left knee are compared to a previous examination dated 1/30/2020.    Impression: No evidence for acute fracture or dislocation.    Grossly stable severe osteoarthritis.    Moderate suprapatellar joint effusion, increased since the previous examination.    end of copied text >  < from: CT Chest No Cont (11.24.20 @ 11:27) >    EXAM:  CT CHEST                            PROCEDURE DATE:  11/24/2020          INTERPRETATION:  CLINICAL INDICATION: 80 years  Female with chf, pleural effusions, r/o pna.    COMPARISON: None.    PROCEDURE:  CT of the Chest was performed without intravenous contrast.  Sagittal and coronal reformats were performed.    Limitations: Motion artifact. Streak artifact from patient's adjacent arms. Absence of IV contrast limits evaluation of the vascular structures.    FINDINGS:    LUNGS AND AIRWAYS:Patent central airways.  Right lower lobe compressive atelectasis. Mild left lower lobe dependent atelectasis. Mild left lung volume loss.  PLEURA: Small right pleural effusion.  MEDIASTINUM AND TATI: No lymphadenopathy.    < end of copied text >  < from: Xray Chest 1 View- PORTABLE-Urgent (Xray Chest 1 View- PORTABLE-Urgent .) (11.20.20 @ 16:52) >  EXAM:  XR CHEST PORTABLE URGENT 1V                            PROCEDURE DATE:  11/20/2020      INTERPRETATION:  CLINICAL STATEMENT: Follow-up chest pain.    TECHNIQUE: AP view of the chest.    COMPARISON: 11/17/2020    FINDINGS/  IMPRESSION:  Worsening airspace opacity right lung base. Follow-up recommended.    Probable small bilateral pleural effusions.x  Heart size cannot be accurately assessed in this projection.    end of copied text

## 2020-11-25 NOTE — PROGRESS NOTE ADULT - PROBLEM SELECTOR PLAN 10
daughter refusing JESE, wants to take her back home, pending CM for HHA Daughter refusing JESE, wants to take her back home, HHA will not resume until two-three days due to holiday schedule, daughter verbalizes understanding and insisted to take pt home today. CM to arrange transportation. Advised daughter to follow up with PCP in 1 week and regarding new medication Diomox for two more day. daughter verbalizes understanding.

## 2020-11-25 NOTE — PROGRESS NOTE ADULT - PROBLEM SELECTOR PROBLEM 4
DM (diabetes mellitus)
Atrial fibrillation
Atrial fibrillation
Chronic renal insufficiency
DM (diabetes mellitus)
Chronic renal insufficiency

## 2020-11-25 NOTE — PROGRESS NOTE ADULT - PROBLEM SELECTOR PLAN 8
likely due to lasix and poor po intake  supplemented  monitor BMP
-continue atorvastatin
Continue amlodipine ,losartan metoprolol 12.5mg PO BID
amlodipine ,losartan metoprolol 12.5mg PO BID  monitor blood pressure
f/u lipid panel   continue atorvastatin
likely due to lasix and poor po intake  K+ stable today  BMP in am
Lipid panel normal  C/w statin

## 2020-11-25 NOTE — PROGRESS NOTE ADULT - PROBLEM SELECTOR PROBLEM 5
Renal mass
Chronic renal insufficiency
Chronic renal insufficiency
Dementia
Renal mass
Dementia

## 2020-11-25 NOTE — PROGRESS NOTE ADULT - PROBLEM SELECTOR PLAN 2
see CT chest as above  pt refused lasix 40mg dose today, stat one dose ordered since daughter was able to convince her to follow up with medical intervention  monitor urine output   f/u wt tomorrow
Chest CXR w/ diffuse patchy densities of the bilateral lower lung fields pna vs chf  -5 day course of ctx/azithromycin administered  CBC mildly elevated today - pt otherwise asymptomatic - will trend  Dr bar pulmonology   f/u blood culture - neg from 11/18  supplemental oxygen as needed
Chest CXR w/ diffuse patchy densities of the bilateral lower lung fields pna vs chf  -5 day course of ctx/azithromycin administered - per pulm would like 7 days, so 2 more days  CBC increasing, differential includes infection vs. PE - will send repeat cultures, CTA chest. Pt is tachycardic but also has afib, so her HR is likely due to that.  Dr bar pulmonology   f/u blood culture - neg from 11/18  supplemental oxygen as needed
Chest CXR w/ diffuse patchy densities of the bilateral lower lung fields pna vs chf  continue  Rocephin and Azithromycin   f/u ESR, CRP, Lactic Acid, LDH and Procalcitonin  f/u blood culture   supplemental oxygen as needed
Chest CXR w/ diffuse patchy densities of the bilateral lower lung fields pna vs chf  continue  Rocephin and Azithromycin   f/u repeat lactate and CBC   repeat chest xray today   Dr bar pulmonology   f/u blood culture   supplemental oxygen as needed
Chest CXR w/ diffuse patchy densities of the bilateral lower lung fields pna vs chf  continue Rocephin and Azithromycin started on 11/18  f/u repeat lactate and CBC - CBC downtrending  repeat chest xray shows worsening rt airspace opacity - will need reassessment  Dr bar pulmonology   f/u blood culture - neg from 11/18  supplemental oxygen as needed
Continue rocephin and azithro (will need 7 day course)  Worsening leukocytosis   CXR repeated today   Dr bar pulmonology
See CT chest as above, clinically improving   C/w lasix, Asa and BB  start Diomox x 2 days  C/w I/O  daily weights  Pulm Dr Sweeney
On Eliquis at home 2.5 mg BID  Patient is rate controlled   Increased to 5 mg BID as per Cardiologist recommendation

## 2020-11-25 NOTE — PROGRESS NOTE ADULT - PROBLEM SELECTOR PROBLEM 2
Acute on chronic diastolic congestive heart failure
Acute on chronic diastolic congestive heart failure
Pneumonia
Atrial fibrillation

## 2020-11-25 NOTE — PROGRESS NOTE ADULT - PROBLEM SELECTOR PROBLEM 9
Prophylactic measure
HLD (hyperlipidemia)
Hypokalemia
Prophylactic measure
Hypokalemia

## 2020-11-25 NOTE — PROGRESS NOTE ADULT - ASSESSMENT
79 yo F with HTN, HLD, DM, vascular dementia, and paroxysmal AF on Eliquis who presented with dyspnea and dry cough. Pt found to have patchy infiltrate of the right lung base on CXR,  Troponin  x 2 negative, mildly elevated Pro BNP 2012. Admitted for acute on chronic diastolic HF exacerbation and  pneumonia, treated  with IV diuretics now transitioned to PO. Also treated for PNA with antibiotics. Pt with worsening leukocytosis on 11/23 Pneumonia  antibiotics restarted. Patient c/o L knee pain, X-ray unremarkable.

## 2020-11-25 NOTE — PROGRESS NOTE ADULT - PROBLEM SELECTOR PROBLEM 10
no
Discharge planning issues
Prophylactic measure

## 2020-11-25 NOTE — PROGRESS NOTE ADULT - PROBLEM SELECTOR PROBLEM 3
Afib
Afib
Atrial fibrillation
Hypokalemia
Hypokalemia
CHF (congestive heart failure)

## 2020-11-25 NOTE — DISCHARGE NOTE NURSING/CASE MANAGEMENT/SOCIAL WORK - PATIENT PORTAL LINK FT
You can access the FollowMyHealth Patient Portal offered by [facility] by registering at the following website: [email or web address]. By joining [email or web address]’s FollowMyHealth portal, you will also be able to view your health information using other applications (apps) compatible with our system.

## 2020-11-25 NOTE — PROGRESS NOTE ADULT - PROBLEM SELECTOR PLAN 9
continue eliquis
DVT PPX: Pt on Eliquis for afib
DVT ppx - on eliquis for afib
RISK                                                          Points  [] Previous VTE                                           3  [] Thrombophilia                                        2  [] Lower limb paralysis                              2   [] Current Cancer                                       2   [x] Immobilization > 24 hrs                        1  [] ICU/CCU stay > 24 hours                       1  [x] Age > 60                                                   1    score 2
RISK                                                          Points  [] Previous VTE                                           3  [] Thrombophilia                                        2  [] Lower limb paralysis                              2   [] Current Cancer                                       2   [x] Immobilization > 24 hrs                        1  [] ICU/CCU stay > 24 hours                       1  [x] Age > 60                                                   1    score 2    Dispo: home with PT once weaned off O2
RISK                                                          Points  [] Previous VTE                                           3  [] Thrombophilia                                        2  [] Lower limb paralysis                              2   [] Current Cancer                                       2   [x] Immobilization > 24 hrs                        1  [] ICU/CCU stay > 24 hours                       1  [x] Age > 60                                                   1    score 2    Dispo: home with PT once weaned off O2  Plan d/w NP Pretty
f/u lipid panel   continue atorvastatin
replaced aggressively  monitor bmp daily
Resolved

## 2020-11-25 NOTE — PROGRESS NOTE ADULT - PROBLEM SELECTOR PLAN 3
Eliquis 5mg PO BID   Afib with   Consider increasing metoprolol if HR remains elevated  she refused all meds today -- daughter was able to convince her to take PM meds   monitor v/s
Eliquis 5mg PO BID   rate controlled on metoprolol
K 2.7  Replaced   Repeat BMP today and replace as needed
Rate controlled   C/w Eliquis
replaced  monitor bmp daily
Pro-BNP 2012 (baseline 2050 on 8/13/2020)  Troponin x3 <0.015  TTE (1/31/2020) identified a severely dilated left atrium. Mild concentric left ventricular hypertrophy.  LVEF = 55 to 60%  CXR showed diffuse patchy densities of the bilateral lower lung fields   COVID19 neg  C/w Telemetry for Continuous Cardiac Monitoring  F/u repeat TTE   Strict I&Os, Daily Weights  Fluid Restrictions <2000 daily  C/w low dose Metoprolol   Monitor BP  C/w Lasix  PT recommends Home PT  Speech and Swallow recommends regular diet (low fat, low carb), thin liquids   Fall Precautions, Aspiration Precautions  Cardiology Dr Duque

## 2020-11-25 NOTE — PROGRESS NOTE ADULT - PROBLEM SELECTOR PROBLEM 1
Pneumonia
Acute on chronic diastolic congestive heart failure
Pneumonia
Pneumonia

## 2020-11-25 NOTE — PROGRESS NOTE ADULT - SUBJECTIVE AND OBJECTIVE BOX
Time of Visit:  Patient seen and examined.     MEDICATIONS  (STANDING):  acetaZOLAMIDE    Tablet 250 milliGRAM(s) Oral two times a day  amLODIPine   Tablet 5 milliGRAM(s) Oral daily  apixaban 5 milliGRAM(s) Oral two times a day  aspirin  chewable 81 milliGRAM(s) Oral daily  atorvastatin 40 milliGRAM(s) Oral at bedtime  benzocaine 15 mG/menthol 3.6 mG (Sugar-Free) Lozenge 1 Lozenge Oral every 4 hours  dextrose 40% Gel 15 Gram(s) Oral once  dextrose 5%. 1000 milliLiter(s) (50 mL/Hr) IV Continuous <Continuous>  dextrose 5%. 1000 milliLiter(s) (100 mL/Hr) IV Continuous <Continuous>  dextrose 50% Injectable 25 Gram(s) IV Push once  dextrose 50% Injectable 12.5 Gram(s) IV Push once  dextrose 50% Injectable 25 Gram(s) IV Push once  donepezil 10 milliGRAM(s) Oral at bedtime  furosemide    Tablet 40 milliGRAM(s) Oral daily  glucagon  Injectable 1 milliGRAM(s) IntraMuscular once  insulin glargine Injectable (LANTUS) 9 Unit(s) SubCutaneous at bedtime  insulin lispro (ADMELOG) corrective regimen sliding scale   SubCutaneous three times a day before meals  insulin lispro Injectable (ADMELOG) 8 Unit(s) SubCutaneous three times a day before meals  lidocaine 2% Gel 1 Application(s) Topical three times a day  losartan 50 milliGRAM(s) Oral daily  metoprolol tartrate 12.5 milliGRAM(s) Oral two times a day  nystatin Powder 1 Application(s) Topical three times a day      MEDICATIONS  (PRN):  acetaminophen   Tablet .. 650 milliGRAM(s) Oral every 6 hours PRN Mild Pain (1 - 3), Moderate Pain (4 - 6)  guaiFENesin   Syrup  (Sugar-Free) 100 milliGRAM(s) Oral every 6 hours PRN Cough       Medications up to date at time of exam.      PHYSICAL EXAMINATION:  Patient has no new complaints.  GENERAL: The patient is a well-developed, well-nourished, in no apparent distress.     Vital Signs Last 24 Hrs  T(C): 36.6 (2020 15:53), Max: 36.6 (2020 15:53)  T(F): 97.9 (2020 15:53), Max: 97.9 (2020 15:53)  HR: 104 (2020 15:53) (77 - 107)  BP: 97/54 (2020 18:15) (97/54 - 126/78)  BP(mean): --  RR: 19 (2020 15:53) (17 - 19)  SpO2: 97% (2020 15:53) (95% - 97%)   (if applicable)    Chest Tube (if applicable)    HEENT: Head is normocephalic and atraumatic. Extraocular muscles are intact. Mucous membranes are moist.     NECK: Supple, no palpable adenopathy.    LUNGS: Clear to auscultation, no wheezing, rales, or rhonchi.    HEART: Regular rate and rhythm without murmur.    ABDOMEN: Soft, nontender, and nondistended.  No hepatosplenomegaly is noted.    : No painful voiding, no pelvic pain    EXTREMITIES: Without any cyanosis, clubbing, rash, lesions or edema.    NEUROLOGIC: Awake, alert, oriented, grossly intact    SKIN: Warm, dry, good turgor.      LABS:                        13.5   12.41 )-----------( 270      ( 2020 06:40 )             42.8     11-    139  |  100  |  15  ----------------------------<  123<H>  4.5   |  31  |  0.70    Ca    8.6      2020 06:40    TPro  6.7  /  Alb  2.3<L>  /  TBili  0.9  /  DBili  x   /  AST  17  /  ALT  10  /  AlkPhos  76  11-25                        MICROBIOLOGY: (if applicable)    RADIOLOGY & ADDITIONAL STUDIES:  EKG:   CXR:  ECHO:    IMPRESSION: 80y Female PAST MEDICAL & SURGICAL HISTORY:  Afib    Dementia    HLD (hyperlipidemia)    HTN (hypertension)    DM (diabetes mellitus)    H/O:      p/w           IMP: This is a 80 yr old woman with  + second smoke exposure from late  ,uncontrol DM, HTN, HDL, vascular dementia, CAD, afib  admitted for acute hypoxic hypercapnic resp failure due to a combination of b/l PNA and heart failure. Covid-19 neg.    - Acute Hypoxic Hypercapnic Resp Failure  - PAN.. b/l.. CAP  - Pul edema  - Heart Failure  - DM -2 uncontrol   - HLD  - CAD/ Afib  - Moderate Pul HtN.        Sugg;    -Diuresis   -metabolic alkalosis , diamox 250 bid x 2 days  -pat is afebrile and neg cultures but WBC trending down   -completed  antibx for total 7 days (  last dose)  -O2 supp , keep sat>90%  -cultures neg   -blood sugar control   -dvt/gi prophy  -CT chest report noted  d/c with NP

## 2020-11-25 NOTE — PROGRESS NOTE ADULT - PROBLEM SELECTOR PLAN 7
hx of knee replacement  no swelling or redness noted  no incident   xray order - f/u result  continue tyleno and lidocaine gel for pain mgmt
-controlled; c/w amlodipine, losartan, metoprolol  monitor blood pressure
Hemoglobin A1c= 9.7% on 3/11/2020 blood sugar elevated   continue Blood Glucose ACHS  Sliding Scale ACHS  lantus 5 units at bedtime
Hx of knee replacement  Xray with no acute finding  C/w Tylenol and lidocaine gel
a1c 8.9   lantus 9 units at bedtime, admelog 5 units premeal, and sliding scale
amlodipine ,losartan metoprolol 12.5mg PO BID  monitor blood pressure
C/w Lasix and low dose Metoprolol

## 2020-11-25 NOTE — PROGRESS NOTE ADULT - PROBLEM SELECTOR PROBLEM 7
Knee pain, left
DM (diabetes mellitus)
DM (diabetes mellitus)
HTN (hypertension)
Knee pain, left
HTN (hypertension)

## 2020-11-25 NOTE — PROGRESS NOTE ADULT - PROBLEM SELECTOR PROBLEM 6
HTN (hypertension)
DM (diabetes mellitus)
Dementia
Dementia
HTN (hypertension)
DM (diabetes mellitus)

## 2020-11-25 NOTE — PROGRESS NOTE ADULT - PROBLEM SELECTOR PLAN 1
Completed 5days of azithromax and ceftriaxone   11/23 pt developed leukocytosis  Blood Cx prelim negative  C/w zithromax and ceftriaxone   CBC in am

## 2020-11-26 NOTE — H&P ADULT - HISTORY OF PRESENT ILLNESS
Patient is a 80 year old female with PMH of HTN, HLD, DM, vascular dementia, and paroxysmal AF on Eliquis who presents to the ED after a fall. Patient has dementia and unable to provide any history. She says she left the hospital yesterday and does not know why she is here. Per daughter, patient had a fall as she was coming from the restroom, she slipped over fall and fell. Patient reports left knee pain but no other complaints. Patient was just discharged from [facility] 2 days ago and was admitted for pneumonia (treated with rocephin and azitho) and CHF exacerbation. At that time, she was suggested rehab but daughter refused. No LOC or head trauma reported.    In ED,   Vital Signs Last 24 Hrs  T(C): 36.5 (26 Nov 2020 15:44), Max: 36.6 (26 Nov 2020 13:10)  T(F): 97.7 (26 Nov 2020 15:44), Max: 97.9 (26 Nov 2020 13:10)  HR: 120 (26 Nov 2020 15:44) (116 - 120)  BP: 102/56 (26 Nov 2020 15:44) (102/56 - 126/76)  BP(mean): --  RR: 17 (26 Nov 2020 15:44) (17 - 17)  SpO2: 94% (26 Nov 2020 15:44) (94% - 95%)  EKG showed afib with RVR. X-ray knee showed no fracture.

## 2020-11-26 NOTE — H&P ADULT - PROBLEM SELECTOR PLAN 4
patient is on metoprolol, amlodipine ,losartan   will start metoprolol, amlodipine  hold losartan for now  resume meds as needs.   monitor bp  DASH diet  fu lipid panel

## 2020-11-26 NOTE — ED PROVIDER NOTE - CRANIAL NERVE AND PUPILLARY EXAM
tongue is midline/corneal reflex intact/cranial nerves 2-12 intact/central and peripheral vision intact/extra-ocular movements intact cranial nerves 2-12 intact/extra-ocular movements intact/tongue is midline

## 2020-11-26 NOTE — H&P ADULT - PROBLEM SELECTOR PLAN 1
patient presents after a mechanical fall at home  no LOC or head trauma  CTH showed no hemorrhage or fracture  X-rays knee and pelvis showed no fractures  daughter refused rehab on previous admission, agreeable now  f/u PT  CM consulted  SW consulted

## 2020-11-26 NOTE — ED PROVIDER NOTE - CLINICAL SUMMARY MEDICAL DECISION MAKING FREE TEXT BOX
81 yo F with weakness and mechanical fall. Patient severely deconditioned and unable to bear weight. patient discharged from  yesterday but declined rehab at that time. Discussed with daughter, Ladonna and agreeable with admission and likely rehab. Discussed with hospitalist, Dr. Woods and accepted for admission.

## 2020-11-26 NOTE — PROGRESS NOTE ADULT - SUBJECTIVE AND OBJECTIVE BOX
Time of visit:    CHIEF COMPLAINT: Patient is a 80y old  Female who presents with a chief complaint of     HPI:   Patient seen and examined.     PAST MEDICAL & SURGICAL HISTORY:  Afib    Dementia    HLD (hyperlipidemia)    HTN (hypertension)    DM (diabetes mellitus)    H/O:         Allergies    No Known Allergies    Intolerances        MEDICATIONS  (STANDING):      MEDICATIONS  (PRN):   Medications up to date at time of exam.    Medications up to date at time of exam.    FAMILY HISTORY:      SOCIAL HISTORY  Smoking History: [   ] smoking/smoke exposure, [   ] former smoker  Living Condition: [   ] apartment, [   ] private house  Work History:   Travel History: denies recent travel  Illicit Substance Use: denies  Alcohol Use: denies    REVIEW OF SYSTEMS:    CONSTITUTIONAL:  denies fevers, chills, sweats, weight loss    HEENT:  denies diplopia or blurred vision, sore throat or runny nose.    CARDIOVASCULAR:  denies pressure, squeezing, tightness, or heaviness about the chest; no palpitations.    RESPIRATORY:  denies SOB, cough, HASTINGS, wheezing.    GASTROINTESTINAL:  denies abdominal pain, nausea, vomiting or diarrhea.    GENITOURINARY: denies dysuria, frequency or urgency.    NEUROLOGIC:  denies numbness, tingling, seizures or weakness.    PSYCHIATRIC:  denies disorder of thought or mood.    MSK: denies swelling, redness      PHYSICAL EXAMINATION:    GENERAL: The patient is a well-developed, well-nourished, in no apparent distress.     Vital Signs Last 24 Hrs  T(C): 36.5 (2020 15:44), Max: 36.6 (2020 13:10)  T(F): 97.7 (2020 15:44), Max: 97.9 (2020 13:10)  HR: 120 (2020 15:44) (116 - 120)  BP: 102/56 (2020 15:44) (97/54 - 126/76)  BP(mean): --  RR: 17 (2020 15:44) (17 - 17)  SpO2: 94% (2020 15:44) (94% - 95%)   (if applicable)    Chest Tube (if applicable)    HEENT: Head is normocephalic and atraumatic. Extraocular muscles are intact. Mucous membranes are moist.     NECK: Supple, no palpable adenopathy.    LUNGS: Clear to auscultation, no wheezing, rales, or rhonchi.    HEART: Regular rate and rhythm without murmur.    ABDOMEN: Soft, nontender, and nondistended.  No hepatosplenomegaly is noted.    RENAL: No difficulty voiding, no pelvic pain    EXTREMITIES: Without any cyanosis, clubbing, rash, lesions or edema.    NEUROLOGIC: Awake, alert, oriented, grossly intact    SKIN: Warm, dry, good turgor.      LABS:                        13.0   12.15 )-----------( 296      ( 2020 14:36 )             41.1         135  |  97  |  20<H>  ----------------------------<  295<H>  4.1   |  29  |  0.95    Ca    8.8      2020 14:36    TPro  7.6  /  Alb  2.7<L>  /  TBili  0.7  /  DBili  x   /  AST  21  /  ALT  15  /  AlkPhos  88      PT/INR - ( 2020 14:36 )   PT: 19.4 sec;   INR: 1.67 ratio         PTT - ( 2020 14:36 )  PTT:39.4 sec      CARDIAC MARKERS ( 2020 14:36 )  <0.015 ng/mL / x     / x     / x     / x                    MICROBIOLOGY: (if applicable)    RADIOLOGY & ADDITIONAL STUDIES:  EKG:   CXR:  ECHO:    IMPRESSION: 80y Female PAST MEDICAL & SURGICAL HISTORY:  Afib    Dementia    HLD (hyperlipidemia)    HTN (hypertension)    DM (diabetes mellitus)    H/O:      p/w                   RECOMMENDATIONS:   Time of visit:    CHIEF COMPLAINT: Patient is a 80y old  Female who presents with a chief complaint of     HPI:   Patient is a 80 year old female with PMH of HTN, HLD, DM, vascular dementia, and paroxysmal AF on Eliquis who presents to the ED after a fall. Patient has dementia and unable to provide any history. She says she left the hospital yesterday and does not know why she is here. Per daughter, patient had a fall as she was coming from the restroom, she slipped over fall and fell. Patient reports left knee pain but no other complaints. Patient was just discharged from [facility] 2 days ago and was admitted for pneumonia (treated with rocephin and azitho) and CHF exacerbation. At that time, she was suggested rehab but daughter refused. No LOC or head trauma reported.        PAST MEDICAL & SURGICAL HISTORY:  Afib    Dementia    HLD (hyperlipidemia)    HTN (hypertension)    DM (diabetes mellitus)    H/O:         Allergies    No Known Allergies    Intolerances        MEDICATIONS  (STANDING):      MEDICATIONS  (PRN):   Medications up to date at time of exam.    Medications up to date at time of exam.    FAMILY HISTORY:      SOCIAL HISTORY  Smoking History: [   ] smoking/smoke exposure, [   ] former smoker  Living Condition: [   ] apartment, [   ] private house  Work History:   Travel History: denies recent travel  Illicit Substance Use: denies  Alcohol Use: denies    REVIEW OF SYSTEMS: as per H/P    CONSTITUTIONAL:  denies fevers, chills, sweats, weight loss    HEENT:  denies diplopia or blurred vision, sore throat or runny nose.    CARDIOVASCULAR:  denies pressure, squeezing, tightness, or heaviness about the chest; no palpitations.    RESPIRATORY:  denies SOB, cough, HASTINGS, wheezing.    GASTROINTESTINAL:  denies abdominal pain, nausea, vomiting or diarrhea.    GENITOURINARY: denies dysuria, frequency or urgency.    NEUROLOGIC:  denies numbness, tingling, seizures or weakness.    PSYCHIATRIC:  denies disorder of thought or mood.    MSK: denies swelling, redness      PHYSICAL EXAMINATION:    GENERAL: The patient is a well-developed, well-nourished, in no apparent distress.     Vital Signs Last 24 Hrs  T(C): 36.5 (2020 15:44), Max: 36.6 (2020 13:10)  T(F): 97.7 (2020 15:44), Max: 97.9 (2020 13:10)  HR: 120 (2020 15:44) (116 - 120)  BP: 102/56 (2020 15:44) (97/54 - 126/76)  BP(mean): --  RR: 17 (2020 15:44) (17 - 17)  SpO2: 94% (2020 15:44) (94% - 95%)   (if applicable)    Chest Tube (if applicable)    HEENT: Head is normocephalic and atraumatic. Extraocular muscles are intact. Mucous membranes are moist.     NECK: Supple, no palpable adenopathy.    LUNGS: Clear to auscultation, no wheezing, rales, or rhonchi.    HEART: Regular rate and rhythm without murmur.    ABDOMEN: Soft, nontender, and nondistended.  No hepatosplenomegaly is noted.    RENAL: No difficulty voiding, no pelvic pain    EXTREMITIES: Without any cyanosis, clubbing, rash, lesions or edema.    NEUROLOGIC: Awake, alert,     SKIN: Warm, dry, good turgor.      LABS:                        13.0   12.15 )-----------( 296      ( 2020 14:36 )             41.1         135  |  97  |  20<H>  ----------------------------<  295<H>  4.1   |  29  |  0.95    Ca    8.8      2020 14:36    TPro  7.6  /  Alb  2.7<L>  /  TBili  0.7  /  DBili  x   /  AST  21  /  ALT  15  /  AlkPhos  88      PT/INR - ( 2020 14:36 )   PT: 19.4 sec;   INR: 1.67 ratio         PTT - ( 2020 14:36 )  PTT:39.4 sec      CARDIAC MARKERS ( 2020 14:36 )  <0.015 ng/mL / x     / x     / x     / x                    MICROBIOLOGY: (if applicable)    RADIOLOGY & ADDITIONAL STUDIES:  EKG:     CT head:  < from: CT Head No Cont (20 @ 15:26) >    EXAM:  CT BRAIN                            PROCEDURE DATE:  2020          INTERPRETATION:  History: Head trauma, weakness.    Description: A noncontrast head CT was performed. Axial images were performed from the skull base to the vertex withcoronal/sagittal reconstructions.    Comparison is made to prior head CT study from 03/10/2020.    An age-indeterminate lacunar infarct involves the left frontal white matter, not present on the 03/10/2020 exam. Correlate with neurologic history and physical exam findings.    There is no evidence for acute hemorrhage, mass effect, calvarial fracture, or hydrocephalus. A cavum septum pellucidum is again noted. Atherosclerotic calcifications involve the vasculature of the [place]. Tiny lipomatous changes along the interhemispheric falx are unchanged.    Moderate patchy hypodensity without mass effect is noted in the periventricular white matter which most likely represents chronic microvascular ischemic changes given the patient's age.    Cerebral volume loss is present with secondary proportional prominence of the sulci and ventricles.    A retention cyst involves the left maxillary sinus.    IMPRESSION:    No evidence for calvarial fracture or acute intracranial hemorrhage. 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 If the patient has new and persistent symptoms, consider short interval follow-up head CT or brain MRI follow-up if there are no MRI contraindications.            ANI LIMA MD; Attending Radiologist  This document has been electronically signed. 2020  3:29PM    < end of copied text >    CXR:  < from: Xray Chest 1 View- PORTABLE-Routine (Xray Chest 1 View- PORTABLE-Routine .) (20 @ 10:23) >    EXAM:  XR CHEST PORTABLE ROUTINE 1V                            PROCEDURE DATE:  2020          INTERPRETATION:  AP erect chest on 2020 9:35 AM. Patient has an unwitnessed fall. Patient has hypertension and diabetes. Patient has dementia.    Heart is magnified by technique.    No lung or pleural finding is evident.    Calcifications of both rotator cuff is noted. [place] in the left shoulder noted.    Bones are grossly intact.    Chest is similar to .    IMPRESSION: No acute finding or change.            SHELTON MENSAH M.D., ATTENDING RADIOLOGIST  This document has been electronically signed. 2020  5:02PM    < end of copied text >  ECHO:  < from: Transthoracic Echocardiogram (20 @ 09:16) >    Patient name: BAILEY WAGGONER  YOB: 1940   Age: 80 (F)   MR#: [id number]  Study Date: 2020  Location: [facility]4Sonographer: Gisselle Ramos [facility]  Study quality: Technically difficult  Referring Physician:  TRINIDAD CASTILLO MD  Blood Pressure: 109/60 mmHg  Height: 150 cm  Weight: 80 kg  BSA: 1.8 m2  ------------------------------------------------------------------------    PROCEDURE: Transthoracic echocardiogram with 2-D, M-Mode  and complete spectral and color flow Doppler.  INDICATION: Heart failure, unspecified (I50.9)  HISTORY:  ------------------------------------------------------------------------  DIMENSIONS:  Dimensions:     Normal Values:  LA:     3.8 cm    2.0 - 4.0 cm  Ao:     3.2 cm    2.0 - 3.8 cm  SEPTUM: 0.9 cm  0.6 - 1.2 cm  PWT:    0.9 cm    0.6 - 1.1 cm  LVIDd:  5.5 cm    3.0 - 5.6 cm  LVIDs:  3.5 cm    1.8 - 4.0 cm      Derived Variables:  LVMI: 106 g/m2  RWT: 0.32  Ejection Fraction Visual Estimate: >55 %    ------------------------------------------------------------------------  OBSERVATIONS:  Mitral Valve: Mitral annular calcification. Mild mitral  regurgitation.  Aortic Root: Normal aortic root.  Aortic Valve: Normal trileaflet aortic valve.  Left Atrium: Normal left atrium.  LA volume index =27  cc/m2.  Left Ventricle: Endocardium not well visualized; grossly  normal left ventricular systolic function.   Segmental wall  motion could not be assessed. Increased relative wall  thickness with normal left ventricular (LV) mass index,  consistent with concentric LV remodeling.  Right Heart: Normal right atrium. Right ventricle not well  visualized. Probably normal right ventricular size and  systolic function. There is mild tricuspid regurgitation.  There is mild pulmonic regurgitation.  Pericardium/PleuraNormal pericardium with no pericardial  effusion.  Hemodynamic: RA Pressure is 10 mm Hg. RV systolic pressure  is 51 mm Hg. Moderate pulmonary hypertension.  ------------------------------------------------------------------------  CONCLUSIONS:  TECHNICALLY VERY DIFFICULT STUDY, POOR ACOUSTIC WINDOWS    1. Mitral annular calcification. Mild mitral regurgitation.    2. Increased relative wall thickness with normal left  ventricular (LV) mass index, consistent with concentric LV  remodeling.  3. Endocardium not well visualized; grossly normal left  ventricular systolic function.   Segmental wall motion  could not be assessed.  4. Right ventricle not well visualized. Probably normal  right ventricular size and systolic function.  5. RV systolic pressure is 51 mm Hg. Moderate pulmonary  hypertension.    ------------------------------------------------------------------------  Confirmed on  2020 - 20:49:40 by Roberto Moore MD  ------------------------------------------------------------------------    < end of copied text >    IMPRESSION: 80y Female PAST MEDICAL & SURGICAL HISTORY:  Afib    Dementia    HLD (hyperlipidemia)    HTN (hypertension)    DM (diabetes mellitus)    H/O:      p/w           IMP: This is a  80 year old woman HTN, HLD, DM, vascular dementia, and paroxysmal AF on Eliquis who presents to the ED after a fall. Pat was recently admitted and treated for pna with pul edema.  She improved and was d/c home.  CXR on admission shows improvement .       RECOMMENDATIONS:    -continue diureses  -blood sugar control   -consider holding anticoag due to fall risk  -speak with family  -fall precaution     d/c with attend  -fall precaution

## 2020-11-26 NOTE — H&P ADULT - ASSESSMENT
Patient is a 80 year old female with PMH of HTN, HLD, DM, vascular dementia, and paroxysmal AF on Eliquis who presents to the ED after a fall. CTH showed no evidence for calvarial fracture or acute intracranial hemorrhage, an age-indeterminate lacunar infarct involving the left frontal lobe. X-ray knee and pelvis showed no fractures. CXR was clear. Labs significant for mild leucocytosis 12k and INR 1.6.    Patient admitted to medicine for possible rehab placement.

## 2020-11-26 NOTE — ED PROVIDER NOTE - PROGRESS NOTE DETAILS
discussed with dr. Sweeney who is aware of admission. patient discharged from hospitalist service. Will admit to hospitalist.

## 2020-11-26 NOTE — H&P ADULT - PROBLEM SELECTOR PLAN 3
Patient is on Januvia and pioglitazone at home  hold and start HSS  Hemoglobin A1c=8.9% on 18/11/2020  f/u Hemoglobin A1c with AM labs  Blood Glucose Monitoring ACHS  Sliding Scale ACHS

## 2020-11-26 NOTE — H&P ADULT - PROBLEM SELECTOR PLAN 6
Plan; RISK                                                          Points  [] Previous VTE                                           3  [] Thrombophilia                                        2  [] Lower limb paralysis                              2   [] Current Cancer                                       2   [x] Immobilization > 24 hrs                        1  [] ICU/CCU stay > 24 hours                       1  [x] Age > 60                                                   1  score 2.  on eliquis

## 2020-11-26 NOTE — H&P ADULT - NSHPPHYSICALEXAM_GEN_ALL_CORE
PHYSICAL EXAM:  GENERAL: NAD, lying in bed comfortably  HEAD:  Atraumatic, Normocephalic  EYES: EOMI, PERRLA, conjunctiva and sclera clear  ENT: Moist mucous membranes  NECK: Supple, No JVD  CHEST/LUNG: Clear to auscultation bilaterally; No rales, rhonchi, wheezing, or rubs. Unlabored respirations  HEART: Regular rate and rhythm; No murmurs, rubs, or gallops  ABDOMEN: Bowel sounds present; Soft, Nontender, Nondistended.   EXTREMITIES:  right hand deformity -4 fingers. No clubbing, cyanosis. +1 pedal edema   NERVOUS SYSTEM:  Alert & Oriented X3, forgetful.  MSK: bruise on left knee, tenderess on exam   SKIN: No rashes or lesions

## 2020-11-26 NOTE — H&P ADULT - ATTENDING COMMENTS
Patient seen and examined in ED. Discussed with admitting resident .    77 F with multiple medical problems, known to me from previous admission. She was recommended to discharge to [facility] on 11/25. However Daughter refused JESE.  patient is coming back to ED today s/p witnessed fall. I spoke with the daughter today who is now agreeing for J[facility] placement. Will get PT reevaluation for Placement. Daughter is the HCP, and Wants to keep the pt Full code. Given multiple comorbidity vascular dementia  will also obtain palliative care consult. Patient seen and examined in ED. Discussed with admitting resident .    80 F with multiple medical problems, known to me from previous admission. She was recommended to discharge to [facility] on 11/25. However Daughter refused JESE.  patient is coming back to ED today s/p witnessed fall. I spoke with the daughter today who is now agreeing for JESE placement. Will get PT reevaluation for Placement. Daughter is the HCP, and Wants to keep the pt Full code. Given multiple comorbidity vascular dementia  will also obtain palliative care consult.

## 2020-11-26 NOTE — ED ADULT NURSE NOTE - OBJECTIVE STATEMENT
pt BIBEMS c/o of back and bilateral knee pain s/p fall, pt reports my knees became weak and I fell, pt walks with a walker baseline

## 2020-11-26 NOTE — H&P ADULT - PROBLEM SELECTOR PLAN 2
patient is on eliquis and metoprolol at home  EKG in ED showed afib with RVR   echo 11/18 showed EF 55%  continue with home medications

## 2020-11-26 NOTE — ED PROVIDER NOTE - DURATION
----- Message from Rashi Burrell PA-C sent at 6/27/2018 10:09 AM CDT -----  Please inform pt: electrolytes wnl, continue lasix and potassium until seen by cardiology. Elevated creatinine likely d/t diuretics inpatient, recheck BMP 2 wks.
[facility]. Order pending. Results and recommendations d/w pt he expressed understanding. Order placed.
today

## 2020-11-26 NOTE — ED PROVIDER NOTE - OBJECTIVE STATEMENT
81 yo female with PMHx of AFib on Eliquis, vascular Dementia, HLD, HTN, DM, presents s/p slip and fall due to water being on the floor. Patient states she fell onto her back side and she is now with c/o back pain, left knee pain,  and weakness - knees felt weak. Patient was recently discharged yesterday for acute respiratory failure - patient was admitted for acute on chronic heart failure and PNA. At the time of her admission patient had an XR for her knee that was unremarkable.  During the patient's admission, her daughter refused JESE, and wanted to take the patient home however the  HHA couldn't see her until 2-3 days after the holiday. Patient denies any LOC. Patient had a negative COVID test on 11/19. 79 yo female with PMHx of AFib on Eliquis, vascular Dementia, HLD, HTN, DM, presents s/p slip and fall due to water being on the floor. Patient states she fell onto her back side and she is now with c/o back pain, left knee pain,  and weakness - knees felt weak. Patient was recently discharged yesterday for acute respiratory failure - patient was admitted for acute on chronic heart failure and PNA. At the time of her admission patient had an XR for her knee that was unremarkable.  During the patient's admission, her daughter refused JESE, and wanted to take the patient home however the HHA couldn't see her until 2-3 days after the holiday. Patient denies any LOC. Patient had a negative COVID test on 11/19.

## 2020-11-26 NOTE — ED ADULT NURSE NOTE - NSIMPLEMENTINTERV_GEN_ALL_ED
Implemented All Fall with Harm Risk Interventions:  [place] to call system. Call bell, personal items and telephone within reach. Instruct patient to call for assistance. Room bathroom lighting operational. Non-slip footwear when patient is off stretcher. Physically safe environment: no spills, clutter or unnecessary equipment. Stretcher in lowest position, wheels locked, appropriate side rails in place. Provide visual cue, wrist band, yellow gown, etc. Monitor gait and stability. Monitor for mental status changes and reorient to person, place, and time. Review medications for side effects contributing to fall risk. Reinforce activity limits and safety measures with patient and family. Provide visual clues: red socks.

## 2020-11-27 NOTE — PROGRESS NOTE ADULT - SUBJECTIVE AND OBJECTIVE BOX
PGY-1 Progress Note discussed with attending    PAGER #: [[phone]] TILL 5:00 PM  PLEASE CONTACT ON CALL TEAM:  - On Call Team (Please refer to Ximena) FROM 5:00 PM - 8:30PM  - Nightfloat Team FROM 8:30 -7:30 AM    CHIEF COMPLAINT & BRIEF HOSPITAL COURSE:    INTERVAL HPI/OVERNIGHT EVENTS:       REVIEW OF SYSTEMS:  CONSTITUTIONAL: No fever, weight loss, or fatigue  RESPIRATORY: No cough, wheezing, chills or hemoptysis; No shortness of breath  CARDIOVASCULAR: No chest pain, palpitations, dizziness, or leg swelling  GASTROINTESTINAL: No abdominal pain. No nausea, vomiting, or hematemesis; No diarrhea or constipation. No melena or hematochezia.  GENITOURINARY: No dysuria or hematuria, urinary frequency  NEUROLOGICAL: No headaches, memory loss, loss of strength, numbness, or tremors  SKIN: No itching, burning, rashes, or lesions     MEDICATIONS  (STANDING):  apixaban 5 milliGRAM(s) Oral two times a day  aspirin  chewable 81 milliGRAM(s) Oral daily  atorvastatin 40 milliGRAM(s) Oral at bedtime  dextrose 5%. 1000 milliLiter(s) (100 mL/Hr) IV Continuous <Continuous>  donepezil 10 milliGRAM(s) Oral at bedtime  glucagon  Injectable 1 milliGRAM(s) IntraMuscular once  insulin lispro (ADMELOG) corrective regimen sliding scale   SubCutaneous three times a day before meals  sodium chloride 0.9%. 1000 milliLiter(s) (65 mL/Hr) IV Continuous <Continuous>    MEDICATIONS  (PRN):  acetaminophen   Tablet .. 650 milliGRAM(s) Oral every 6 hours PRN Mild Pain (1 - 3)      Vital Signs Last 24 Hrs  T(C): 36.7 (27 Nov 2020 08:45), Max: 37.1 (26 Nov 2020 21:25)  T(F): 98 (27 Nov 2020 08:45), Max: 98.7 (26 Nov 2020 21:25)  HR: 92 (27 Nov 2020 08:45) (87 - 120)  BP: 116/61 (27 Nov 2020 08:45) (88/51 - 130/77)  BP(mean): --  RR: 16 (27 Nov 2020 08:45) (16 - 19)  SpO2: 100% (27 Nov 2020 08:45) (94% - 100%)    PHYSICAL EXAMINATION:  GENERAL: NAD, well built  HEAD:  Atraumatic, Normocephalic  EYES:  conjunctiva and sclera clear  NECK: Supple, No JVD, Normal thyroid  CHEST/LUNG: Clear to auscultation. Clear to percussion bilaterally; No rales, rhonchi, wheezing, or rubs  HEART: Regular rate and rhythm; No murmurs, rubs, or gallops  ABDOMEN: Soft, Nontender, Nondistended; Bowel sounds present  NERVOUS SYSTEM:  Alert & Oriented X3,    EXTREMITIES:  2+ Peripheral Pulses, No clubbing, cyanosis, or edema  SKIN: warm dry                          12.1   9.84  )-----------( 295      ( 27 Nov 2020 07:43 )             38.1     11-27    139  |  102  |  11  ----------------------------<  192<H>  3.3<L>   |  28  |  0.64    Ca    8.3<L>      27 Nov 2020 07:43  Phos  2.7     11-27  Mg     2.3     11-27    TPro  6.5  /  Alb  2.2<L>  /  TBili  0.5  /  DBili  x   /  AST  14  /  ALT  12  /  AlkPhos  73  11-27    LIVER FUNCTIONS - ( 27 Nov 2020 07:43 )  Alb: 2.2 g/dL / Pro: 6.5 g/dL / ALK PHOS: 73 U/L / ALT: 12 U/L DA / AST: 14 U/L / GGT: x           CARDIAC MARKERS ( 26 Nov 2020 14:36 )  <0.015 ng/mL / x     / x     / x     / x          PT/INR - ( 26 Nov 2020 14:36 )   PT: 19.4 sec;   INR: 1.67 ratio         PTT - ( 26 Nov 2020 14:36 )  PTT:39.4 sec    CAPILLARY BLOOD GLUCOSE      RADIOLOGY & ADDITIONAL TESTS:                   PGY-1 Progress Note discussed with attending    PAGER #: [[phone]] TILL 5:00 PM  PLEASE CONTACT ON CALL TEAM:  - On Call Team (Please refer to Ximena) FROM 5:00 PM - 8:30PM  - Nightfloat Team FROM 8:30 -7:30 AM    CHIEF COMPLAINT & BRIEF HOSPITAL COURSE:  80F with PMHx of HTN, HLD, T2DM, vascular dementia, and paroxysmal AF on Eliquis/ rate control who presents to the ED after a fall. Patient has dementia and unable to provide any history. She is baseline A0x1-2, lives at home alone, able to do most ADL's including cooking, cleaning, and paying bills. As per her daughter Ladonna Rosenberg (HCP), patient had a slip and fall as she was coming from the restroom. Patient reports bilateral knee pain but no other complaints. Patient was just discharged from [facility] 2 days ago and was admitted for pneumonia (treated with rocephin and azitho) with exacerbation. At that time, she was suggested rehab but daughter refused. No LOC or head trauma reported.    In ED,   Vital Signs Last 24 Hrs  T(C): 36.5 (26 Nov 2020 15:44), Max: 36.6 (26 Nov 2020 13:10)  T(F): 97.7 (26 Nov 2020 15:44), Max: 97.9 (26 Nov 2020 13:10)  HR: 120 (26 Nov 2020 15:44) (116 - 120)  BP: 102/56 (26 Nov 2020 15:44) (102/56 - 126/76)  BP(mean): --  RR: 17 (26 Nov 2020 15:44) (17 - 17)  SpO2: 94% (26 Nov 2020 15:44) (94% - 95%)  EKG showed afib with RVR. X-ray knee showed no fracture.      INTERVAL HPI/OVERNIGHT EVENTS:   Patient was examined while _ was lying in bed, Aox3, responding appropriately to questions, in NAD. _ has normal bowel and bladder movements, and denies any other acute complaints.     REVIEW OF SYSTEMS:  CONSTITUTIONAL: No fever, weight loss, or fatigue  RESPIRATORY: No cough, wheezing, chills or hemoptysis; No shortness of breath  CARDIOVASCULAR: No chest pain, palpitations, dizziness, or leg swelling  GASTROINTESTINAL: No abdominal pain. No nausea, vomiting, or hematemesis; No diarrhea or constipation. No melena or hematochezia.  GENITOURINARY: No dysuria or hematuria, urinary frequency  NEUROLOGICAL: No headaches, memory loss, loss of strength, numbness, or tremors  SKIN: No itching, burning, rashes, or lesions     MEDICATIONS  (STANDING):  apixaban 5 milliGRAM(s) Oral two times a day  aspirin  chewable 81 milliGRAM(s) Oral daily  atorvastatin 40 milliGRAM(s) Oral at bedtime  dextrose 5%. 1000 milliLiter(s) (100 mL/Hr) IV Continuous <Continuous>  donepezil 10 milliGRAM(s) Oral at bedtime  glucagon  Injectable 1 milliGRAM(s) IntraMuscular once  insulin lispro (ADMELOG) corrective regimen sliding scale   SubCutaneous three times a day before meals  sodium chloride 0.9%. 1000 milliLiter(s) (65 mL/Hr) IV Continuous <Continuous>    MEDICATIONS  (PRN):  acetaminophen   Tablet .. 650 milliGRAM(s) Oral every 6 hours PRN Mild Pain (1 - 3)      Vital Signs Last 24 Hrs  T(C): 36.7 (27 Nov 2020 08:45), Max: 37.1 (26 Nov 2020 21:25)  T(F): 98 (27 Nov 2020 08:45), Max: 98.7 (26 Nov 2020 21:25)  HR: 92 (27 Nov 2020 08:45) (87 - 120)  BP: 116/61 (27 Nov 2020 08:45) (88/51 - 130/77)  BP(mean): --  RR: 16 (27 Nov 2020 08:45) (16 - 19)  SpO2: 100% (27 Nov 2020 08:45) (94% - 100%)    PHYSICAL EXAMINATION:  GENERAL: NAD, well built  HEAD:  Atraumatic, Normocephalic  EYES:  conjunctiva and sclera clear  NECK: Supple, No JVD, Normal thyroid  CHEST/LUNG: Clear to auscultation. Clear to percussion bilaterally; No rales, rhonchi, wheezing, or rubs  HEART: Regular rate and rhythm; No murmurs, rubs, or gallops  ABDOMEN: Soft, Nontender, Nondistended; Bowel sounds present  NERVOUS SYSTEM:  Alert & Oriented X3,    EXTREMITIES:  2+ Peripheral Pulses, No clubbing, cyanosis, or edema  SKIN: warm dry                          12.1   9.84  )-----------( 295      ( 27 Nov 2020 07:43 )             38.1     11-27    139  |  102  |  11  ----------------------------<  192<H>  3.3<L>   |  28  |  0.64    Ca    8.3<L>      27 Nov 2020 07:43  Phos  2.7     11-27  Mg     2.3     11-27    TPro  6.5  /  Alb  2.2<L>  /  TBili  0.5  /  DBili  x   /  AST  14  /  ALT  12  /  AlkPhos  73  11-27    LIVER FUNCTIONS - ( 27 Nov 2020 07:43 )  Alb: 2.2 g/dL / Pro: 6.5 g/dL / ALK PHOS: 73 U/L / ALT: 12 U/L DA / AST: 14 U/L / GGT: x           CARDIAC MARKERS ( 26 Nov 2020 14:36 )  <0.015 ng/mL / x     / x     / x     / x          PT/INR - ( 26 Nov 2020 14:36 )   PT: 19.4 sec;   INR: 1.67 ratio         PTT - ( 26 Nov 2020 14:36 )  PTT:39.4 sec    CAPILLARY BLOOD GLUCOSE      RADIOLOGY & ADDITIONAL TESTS:                   PGY-1 Progress Note discussed with attending    PAGER #: [[phone]] TILL 5:00 PM  PLEASE CONTACT ON CALL TEAM:  - On Call Team (Please refer to Ximena) FROM 5:00 PM - 8:30PM  - Nightfloat Team FROM 8:30 -7:30 AM    CHIEF COMPLAINT & BRIEF HOSPITAL COURSE:  80F with PMHx of HTN, HLD, T2DM, vascular dementia, and paroxysmal AF on Eliquis/ rate control who presents to the ED after a fall. Patient has dementia and unable to provide any history. She is baseline A0x1-2, lives at home alone, able to do most ADL's including cooking, cleaning, and paying bills. As per her daughter Ladonna Rosenberg (HCP), patient had a slip and fall as she was coming from the restroom. Patient reports bilateral knee pain but no other complaints. [facility] was admitted for pneumonia (treated with rocephin and azitho) with exacerbation. At that time, she was suggested rehab but daughter was reluctant. No LOC or head trauma reported. In the ED, afebrile, /56, , RR 17 SP02; 94%; mild wbc increase to 12  noted. EKG showed Afib with RVR; CTH: age indeterminate lacunar infarct, frontal lobe. X-ray bilateral knees showed no fracture. She was admitted for rehab evaluation      INTERVAL HPI/OVERNIGHT EVENTS:   Patient was examined while she was lying in bed, Aox1-2, responding appropriately to questions, in NAD. She has normal bowel habits, but has a martin in place for urinary drainage. She reports that she doesn;t know why she is in the hospital. She was reoriented time place and person. She complains of tingling/numbness in her BL lower limbs, B/l knee pain, but otherwise denies any acute complaints.     REVIEW OF SYSTEMS:  CONSTITUTIONAL: No fever, weight loss, or fatigue  RESPIRATORY: No cough, wheezing, chills or hemoptysis; No shortness of breath  CARDIOVASCULAR: No chest pain, palpitations, dizziness, or leg swelling  GASTROINTESTINAL: No abdominal pain. No nausea, vomiting, or hematemesis; No diarrhea or constipation. No melena or hematochezia.  GENITOURINARY: No dysuria or hematuria, urinary frequency  NEUROLOGICAL: Numbness/ tingling in bilateral LL's, B/l knee pain, No headaches, memory loss, loss of strength, or tremors  SKIN: No itching, burning, rashes, or lesions     MEDICATIONS  (STANDING):  apixaban 5 milliGRAM(s) Oral two times a day  aspirin  chewable 81 milliGRAM(s) Oral daily  atorvastatin 40 milliGRAM(s) Oral at bedtime  dextrose 5%. 1000 milliLiter(s) (100 mL/Hr) IV Continuous <Continuous>  donepezil 10 milliGRAM(s) Oral at bedtime  glucagon  Injectable 1 milliGRAM(s) IntraMuscular once  insulin lispro (ADMELOG) corrective regimen sliding scale   SubCutaneous three times a day before meals  sodium chloride 0.9%. 1000 milliLiter(s) (65 mL/Hr) IV Continuous <Continuous>    MEDICATIONS  (PRN):  acetaminophen   Tablet .. 650 milliGRAM(s) Oral every 6 hours PRN Mild Pain (1 - 3)      Vital Signs Last 24 Hrs  T(C): 36.7 (27 Nov 2020 08:45), Max: 37.1 (26 Nov 2020 21:25)  T(F): 98 (27 Nov 2020 08:45), Max: 98.7 (26 Nov 2020 21:25)  HR: 92 (27 Nov 2020 08:45) (87 - 120)  BP: 116/61 (27 Nov 2020 08:45) (88/51 - 130/77)  BP(mean): --  RR: 16 (27 Nov 2020 08:45) (16 - 19)  SpO2: 100% (27 Nov 2020 08:45) (94% - 100%)    PHYSICAL EXAMINATION:  GENERAL: Elderly  lady, moderately built, appears stated age, in NAD  HEAD:  Atraumatic, Normocephalic  EYES:  conjunctiva and sclera clear  NECK: Supple, No JVD, Normal thyroid  CHEST/LUNG: Clear to auscultation. Clear to percussion bilaterally; No rales, rhonchi, wheezing, or rubs  HEART: Regular rate and rhythm; No murmurs, rubs, or gallops  ABDOMEN: Soft, Nontender, Nondistended; Bowel sounds present  NERVOUS SYSTEM:  Alert & Oriented X3,  no  motor or sensory loss.   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)  SKIN: warm dry                          12.1   9.84  )-----------( 295      ( 27 Nov 2020 07:43 )             38.1     11-27    139  |  102  |  11  ----------------------------<  192<H>  3.3<L>   |  28  |  0.64    Ca    8.3<L>      27 Nov 2020 07:43  Phos  2.7     11-27  Mg     2.3     11-27    TPro  6.5  /  Alb  2.2<L>  /  TBili  0.5  /  DBili  x   /  AST  14  /  ALT  12  /  AlkPhos  73  11-27    LIVER FUNCTIONS - ( 27 Nov 2020 07:43 )  Alb: 2.2 g/dL / Pro: 6.5 g/dL / ALK PHOS: 73 U/L / ALT: 12 U/L DA / AST: 14 U/L / GGT: x           CARDIAC MARKERS ( 26 Nov 2020 14:36 )  <0.015 ng/mL / x     / x     / x     / x          PT/INR - ( 26 Nov 2020 14:36 )   PT: 19.4 sec;   INR: 1.67 ratio         PTT - ( 26 Nov 2020 14:36 )  PTT:39.4 sec    CAPILLARY BLOOD GLUCOSE      RADIOLOGY & ADDITIONAL TESTS:                   PGY-1 Progress Note discussed with attending    PAGER #: [[phone]] TILL 5:00 PM  PLEASE CONTACT ON CALL TEAM:  - On Call Team (Please refer to Ximena) FROM 5:00 PM - 8:30PM  - Nightfloat Team FROM 8:30 -7:30 AM    CHIEF COMPLAINT & BRIEF HOSPITAL COURSE:  80F with PMHx of HTN, HLD, T2DM, vascular dementia, and paroxysmal AF on Eliquis/ rate control who presents to the ED after a fall. Patient has dementia and unable to provide any history. She is baseline A0x1-2, lives at home alone, able to do most ADL's including cooking, cleaning, and paying bills. As per her daughter Ladonna Rosenberg (HCP), patient had a slip and fall as she was coming from the restroom. Patient reports bilateral knee pain but no other complaints. Patient was just discharged from [facility] 2 days ago and was admitted for pneumonia (treated with rocephin and azitho) with exacerbation. At that time, she was suggested rehab but daughter was reluctant. No LOC or head trauma reported. In the ED, afebrile, /56, , RR 17 SP02; 94%; mild wbc increase to 12  noted. EKG showed Afib with RVR; CTH: age indeterminate lacunar infarct, frontal lobe. X-ray bilateral knees showed no fracture. She was admitted for rehab evaluation      INTERVAL HPI/OVERNIGHT EVENTS:   Patient was examined while she was lying in bed, Aox1-2, responding appropriately to questions, in NAD. She has normal bowel habits, but has a martin in place for urinary drainage. She reports that she doesn;t know why she is in the hospital. She was reoriented time place and person. She complains of tingling/numbness in her BL lower limbs, B/l knee pain, but otherwise denies any acute complaints.     REVIEW OF SYSTEMS:  CONSTITUTIONAL: No fever, weight loss, or fatigue  RESPIRATORY: No cough, wheezing, chills or hemoptysis; No shortness of breath  CARDIOVASCULAR: No chest pain, palpitations, dizziness, or leg swelling  GASTROINTESTINAL: No abdominal pain. No nausea, vomiting, or hematemesis; No diarrhea or constipation. No melena or hematochezia.  GENITOURINARY: No dysuria or hematuria, urinary frequency  NEUROLOGICAL: Numbness/ tingling in bilateral LL's, B/l knee pain, No headaches, memory loss, loss of strength, or tremors  SKIN: No itching, burning, rashes, or lesions     MEDICATIONS  (STANDING):  apixaban 5 milliGRAM(s) Oral two times a day  aspirin  chewable 81 milliGRAM(s) Oral daily  atorvastatin 40 milliGRAM(s) Oral at bedtime  dextrose 5%. 1000 milliLiter(s) (100 mL/Hr) IV Continuous <Continuous>  donepezil 10 milliGRAM(s) Oral at bedtime  glucagon  Injectable 1 milliGRAM(s) IntraMuscular once  insulin lispro (ADMELOG) corrective regimen sliding scale   SubCutaneous three times a day before meals  sodium chloride 0.9%. 1000 milliLiter(s) (65 mL/Hr) IV Continuous <Continuous>    MEDICATIONS  (PRN):  acetaminophen   Tablet .. 650 milliGRAM(s) Oral every 6 hours PRN Mild Pain (1 - 3)      Vital Signs Last 24 Hrs  T(C): 36.7 (27 Nov 2020 08:45), Max: 37.1 (26 Nov 2020 21:25)  T(F): 98 (27 Nov 2020 08:45), Max: 98.7 (26 Nov 2020 21:25)  HR: 92 (27 Nov 2020 08:45) (87 - 120)  BP: 116/61 (27 Nov 2020 08:45) (88/51 - 130/77)  RR: 16 (27 Nov 2020 08:45) (16 - 19)  SpO2: 100% (27 Nov 2020 08:45) (94% - 100%)    PHYSICAL EXAMINATION:  GENERAL: Elderly  lady, moderately built, appears stated age, in NAD  HEAD:  Atraumatic, Normocephalic  EYES:  conjunctiva and sclera clear  NECK: Supple, No JVD  CHEST/LUNG: Clear to auscultation. No rales, rhonchi, wheezing, or rubs  HEART: Regular rate and rhythm; No murmurs, rubs, or gallops  ABDOMEN: Soft, Nontender, Nondistended; Bowel sounds present  NERVOUS SYSTEM:  Alert & Oriented X3,  no  motor or sensory loss.   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)  SKIN: warm dry                          12.1   9.84  )-----------( 295      ( 27 Nov 2020 07:43 )             38.1     11-27    139  |  102  |  11  ----------------------------<  192<H>  3.3<L>   |  28  |  0.64    Ca    8.3<L>      27 Nov 2020 07:43  Phos  2.7     11-27  Mg     2.3     11-27    TPro  6.5  /  Alb  2.2<L>  /  TBili  0.5  /  DBili  x   /  AST  14  /  ALT  12  /  AlkPhos  73  11-27    LIVER FUNCTIONS - ( 27 Nov 2020 07:43 )  Alb: 2.2 g/dL / Pro: 6.5 g/dL / ALK PHOS: 73 U/L / ALT: 12 U/L DA / AST: 14 U/L / GGT: x           CARDIAC MARKERS ( 26 Nov 2020 14:36 )  <0.015 ng/mL / x     / x     / x     / x          PT/INR - ( 26 Nov 2020 14:36 )   PT: 19.4 sec;   INR: 1.67 ratio         PTT - ( 26 Nov 2020 14:36 )  PTT:39.4 sec    CAPILLARY BLOOD GLUCOSE      RADIOLOGY & ADDITIONAL TESTS:      < from: Xray Knee 3 Views, Bilateral (11.26.20 @ 15:24) >  EXAM:  KNEE AP.LAT&OBL. BILAT                            PROCEDURE DATE:  11/26/2020          INTERPRETATION:  Exam Date: 11/26/2020 3:24 PM    Radiographs of the right knee    CLINICAL INFORMATION:  trauma    TECHNIQUE:   Frontal, lateral and oblique views of the knee were obtained.    Findings/  impression:    No acute fracture or dislocation. Severe degenerative changes of the medial and patellofemoral compartments. Small suprasellar joint effusion. Soft tissues are intact. Basilar calcifications.    < end of copied text >  < from: Xray Pelvis AP only (11.26.20 @ 15:21) >  EXAM:  PELVIS AP ONLY                            PROCEDURE DATE:  11/26/2020          INTERPRETATION:  Exam Date: 11/26/2020 3:21 PM    Radiograph of the pelvis    CLINICAL INFORMATION:  trauma    TECHNIQUE:  A single frontal view of the pelvis wasobtained.    FINDINGS/  IMPRESSION:   No prior similar studies are available for review.    Pelvic bones appear intact.  No fracture is recognized.  The hips are located. Age-related degenerative changes of the bilateral hips. Vascular calcifications. The sacroiliac joints and pubic symphysis remain intact.  No pathologic calcifications are seen.  Soft tissues appear intact.    < end of copied text >  < from: CT Head No Cont (11.26.20 @ 15:26) >    EXAM:  CT BRAIN                            PROCEDURE DATE:  11/26/2020          INTERPRETATION:  History: Head trauma, weakness.    Description: A noncontrast head CT was performed. Axial images were performed from the skull base to the vertex withcoronal/sagittal reconstructions.    Comparison is made to prior head CT study from 03/10/2020.    An age-indeterminate lacunar infarct involves the left frontal white matter, not present on the 03/10/2020 exam. Correlate with neurologic history and physical exam findings.    There is no evidence for acute hemorrhage, mass effect, calvarial fracture, or hydrocephalus. A cavum septum pellucidum is again noted. Atherosclerotic calcifications involve the vasculature of the [place]. Tiny lipomatous changes along the interhemispheric falx are unchanged.    Moderate patchy hypodensity without mass effect is noted in the periventricular white matter which most likely represents chronic microvascular ischemic changes given the patient's age.    Cerebral volume loss is present with secondary proportional prominence of the sulci and ventricles.    A retention cyst involves the left maxillary sinus.    IMPRESSION:    No evidence for calvarial fracture or acute intracranial hemorrhage. 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 If the patient has new and persistent symptoms, consider short interval follow-up head CT or brain MRI follow-up if there are no MRI contraindications.    < end of copied text >

## 2020-11-27 NOTE — PHYSICAL THERAPY INITIAL EVALUATION ADULT - ADDITIONAL COMMENTS
Patient used R.W. at home. Patient was discharged home 2 days prior to hospitalization(daughter differed [facility] recommendation)

## 2020-11-27 NOTE — PROGRESS NOTE ADULT - SUBJECTIVE AND OBJECTIVE BOX
Time of Visit:  Patient seen and examined.     MEDICATIONS  (STANDING):  apixaban 5 milliGRAM(s) Oral two times a day  aspirin  chewable 81 milliGRAM(s) Oral daily  atorvastatin 40 milliGRAM(s) Oral at bedtime  dextrose 5%. 1000 milliLiter(s) (100 mL/Hr) IV Continuous <Continuous>  donepezil 10 milliGRAM(s) Oral at bedtime  glucagon  Injectable 1 milliGRAM(s) IntraMuscular once  insulin lispro (ADMELOG) corrective regimen sliding scale   SubCutaneous three times a day before meals  sodium chloride 0.9%. 1000 milliLiter(s) (65 mL/Hr) IV Continuous <Continuous>      MEDICATIONS  (PRN):  acetaminophen   Tablet .. 650 milliGRAM(s) Oral every 6 hours PRN Mild Pain (1 - 3)       Medications up to date at time of exam.      PHYSICAL EXAMINATION:  Patient has no new complaints.  GENERAL: The patient is a well-developed, well-nourished, in no apparent distress.     Vital Signs Last 24 Hrs  T(C): 36.4 (2020 14:44), Max: 37.1 (2020 21:25)  T(F): 97.5 (2020 14:44), Max: 98.7 (2020 21:25)  HR: 66 (2020 14:44) (66 - 111)  BP: 143/60 (2020 14:44) (88/51 - 143/60)  BP(mean): --  RR: 17 (2020 14:44) (16 - 19)  SpO2: 95% (2020 14:44) (95% - 100%)   (if applicable)    Chest Tube (if applicable)    HEENT: Head is normocephalic and atraumatic. Extraocular muscles are intact. Mucous membranes are moist.     NECK: Supple, no palpable adenopathy.    LUNGS: Clear to auscultation, no wheezing, rales, or rhonchi.    HEART: Regular rate and rhythm without murmur.    ABDOMEN: Soft, nontender, and nondistended.  No hepatosplenomegaly is noted.    : No painful voiding, no pelvic pain    EXTREMITIES: Without any cyanosis, clubbing, rash, lesions or edema.    NEUROLOGIC: Awake, alert, oriented, grossly intact    SKIN: Warm, dry, good turgor.      LABS:                        12.1   9.84  )-----------( 295      ( 2020 07:43 )             38.1         139  |  102  |  11  ----------------------------<  192<H>  3.3<L>   |  28  |  0.64    Ca    8.3<L>      2020 07:43  Phos  2.7       Mg     2.3         TPro  6.5  /  Alb  2.2<L>  /  TBili  0.5  /  DBili  x   /  AST  14  /  ALT  12  /  AlkPhos  73      PT/INR - ( 2020 14:36 )   PT: 19.4 sec;   INR: 1.67 ratio         PTT - ( 2020 14:36 )  PTT:39.4 sec      CARDIAC MARKERS ( 2020 14:36 )  <0.015 ng/mL / x     / x     / x     / x                    MICROBIOLOGY: (if applicable)    RADIOLOGY & ADDITIONAL STUDIES:  EKG:   CXR:  ECHO:    IMPRESSION: 80y Female PAST MEDICAL & SURGICAL HISTORY:  Afib    Dementia    HLD (hyperlipidemia)    HTN (hypertension)    DM (diabetes mellitus)    H/O:      p/w              Time of Visit:  Patient seen and examined.     MEDICATIONS  (STANDING):  apixaban 5 milliGRAM(s) Oral two times a day  aspirin  chewable 81 milliGRAM(s) Oral daily  atorvastatin 40 milliGRAM(s) Oral at bedtime  dextrose 5%. 1000 milliLiter(s) (100 mL/Hr) IV Continuous <Continuous>  donepezil 10 milliGRAM(s) Oral at bedtime  glucagon  Injectable 1 milliGRAM(s) IntraMuscular once  insulin lispro (ADMELOG) corrective regimen sliding scale   SubCutaneous three times a day before meals  sodium chloride 0.9%. 1000 milliLiter(s) (65 mL/Hr) IV Continuous <Continuous>      MEDICATIONS  (PRN):  acetaminophen   Tablet .. 650 milliGRAM(s) Oral every 6 hours PRN Mild Pain (1 - 3)       Medications up to date at time of exam.      PHYSICAL EXAMINATION:  Patient has no new complaints.  GENERAL: The patient is a well-developed, well-nourished, in no apparent distress.     Vital Signs Last 24 Hrs  T(C): 36.4 (2020 14:44), Max: 37.1 (2020 21:25)  T(F): 97.5 (2020 14:44), Max: 98.7 (2020 21:25)  HR: 66 (2020 14:44) (66 - 111)  BP: 143/60 (2020 14:44) (88/51 - 143/60)  BP(mean): --  RR: 17 (2020 14:44) (16 - 19)  SpO2: 95% (2020 14:44) (95% - 100%)   (if applicable)    Chest Tube (if applicable)    HEENT: Head is normocephalic and atraumatic. Extraocular muscles are intact. Mucous membranes are moist.     NECK: Supple, no palpable adenopathy.    LUNGS: Clear to auscultation, no wheezing, rales, or rhonchi.    HEART: Regular rate and rhythm without murmur.    ABDOMEN: Soft, nontender, and nondistended.  No hepatosplenomegaly is noted.    : No painful voiding, no pelvic pain    EXTREMITIES: Without any cyanosis, clubbing, rash, lesions or edema.    NEUROLOGIC: Awake, alert, oriented, grossly intact    SKIN: Warm, dry, good turgor.      LABS:                        12.1   9.84  )-----------( 295      ( 2020 07:43 )             38.1         139  |  102  |  11  ----------------------------<  192<H>  3.3<L>   |  28  |  0.64    Ca    8.3<L>      2020 07:43  Phos  2.7       Mg     2.3         TPro  6.5  /  Alb  2.2<L>  /  TBili  0.5  /  DBili  x   /  AST  14  /  ALT  12  /  AlkPhos  73      PT/INR - ( 2020 14:36 )   PT: 19.4 sec;   INR: 1.67 ratio         PTT - ( 2020 14:36 )  PTT:39.4 sec      CARDIAC MARKERS ( 2020 14:36 )  <0.015 ng/mL / x     / x     / x     / x                    MICROBIOLOGY: (if applicable)    RADIOLOGY & ADDITIONAL STUDIES:  EKG:   CXR:  ECHO:    IMPRESSION: 80y Female PAST MEDICAL & SURGICAL HISTORY:  Afib    Dementia    HLD (hyperlipidemia)    HTN (hypertension)    DM (diabetes mellitus)    H/O:      p/w         IMP: This is a  80 year old woman HTN, HLD, DM, vascular dementia, and paroxysmal AF on Eliquis who presents to the ED after a fall. Pat was recently admitted and treated for pna with pul edema.  She improved and was d/c home.  CXR on admission shows improvement .       RECOMMENDATIONS:    -continue diureses  -blood sugar control   -consider holding anticoag due to fall risk  -speak with family  -fall precaution   -daughter requested to stop eliquis   -discussed with daughter Evelina [phone],she rquested rehab eval and endo eval due to elevated A1c  -d/c with attend    spent 36 min

## 2020-11-27 NOTE — PROGRESS NOTE ADULT - PROBLEM SELECTOR PLAN 5
hx of vascular dementia  will resume home med donepezil  monitor mental status. - Hx of vascular dementia  - Resumed home med donepezil  - monitor mental status.

## 2020-11-27 NOTE — PROGRESS NOTE ADULT - PROBLEM SELECTOR PLAN 1
patient presents after a mechanical fall at home  no LOC or head trauma  CTH showed no hemorrhage or fracture  X-rays knee and pelvis showed no fractures  daughter refused rehab on previous admission, agreeable now  f/u PT  CM consulted  SW consulted - P/w mechanical fall at home while returning from bathroom  - no LOC, head trauma, palpitations, chest pain, visual disturbances   - CTH showed no hemorrhage or fracture  - X-rays knee and pelvis showed no fractures  - Daughter was reluctant to accept rehab on previous admission, agreeable now  - Pt eval: recommended [facility]ESE   - CM consulted  - SW consulted: awaiting auth for rehab

## 2020-11-27 NOTE — PROGRESS NOTE ADULT - PROBLEM SELECTOR PLAN 4
patient is on metoprolol, amlodipine ,losartan   will start metoprolol, amlodipine  hold losartan for now  resume meds as needs.   monitor bp  DASH diet  fu lipid panel - Home meds include metoprolol, amlodipine ,losartan   - holding all home BP medications now   - Continue to monitor   resume meds as needs.   monitor bp  DASH diet  fu lipid panel - Home meds include metoprolol, amlodipine ,losartan   - holding all home BP medications now   - Continue to monitor   resume meds as needs.   monitor bp  DASH diet  lipid panel within normal limits

## 2020-11-27 NOTE — PROGRESS NOTE ADULT - PROBLEM SELECTOR PLAN 2
patient is on eliquis and metoprolol at home  EKG in ED showed afib with RVR   echo 11/18 showed EF 55%  continue with home medications - patient is on eliquis and metoprolol at home  - EKG in ED showed afib with RVR   - echo 11/20 showed EF 55%, concentric LVH, pulmonary HTN (RVSP: 51mmHg)  - Metoprolol held for now due to low BP (Pt bolused)   - Continue with home medications

## 2020-11-27 NOTE — PROGRESS NOTE ADULT - PROBLEM SELECTOR PLAN 3
Patient is on Januvia and pioglitazone at home  hold and start HSS  Hemoglobin A1c=8.9% on 18/11/2020  f/u Hemoglobin A1c with AM labs  Blood Glucose Monitoring ACHS  Sliding Scale ACHS - Patient is on Januvia and pioglitazone at home  - hold and start HSS 3 units overnight  - Endo follow up needed   - Hemoglobin A1c=8.9% on 18/11/2020  - f/u Hemoglobin A1c with AM labs  - Blood Glucose Monitoring ACHS  - Sliding Scale ACHS - Patient is on Januvia and pioglitazone at home  - hold and start HSS 3 units overnight  - Endo follow up needed   - Hemoglobin A1c=8.9% on 18/11/2020  - Hemoglobin A1c 8.9 on Nov 18  - Blood Glucose Monitoring ACHS  - Sliding Scale ACHS

## 2020-11-27 NOTE — PHYSICAL THERAPY INITIAL EVALUATION ADULT - PERTINENT HX OF CURRENT PROBLEM, REHAB EVAL
Patient to ED due to fall at home. PMH of HTN, HLD, DM, vascular dementia, and paroxysmal AF on Eliquis. Patient was recently d/sherita from hospital where she was recommended for [facility].

## 2020-11-27 NOTE — PHYSICAL THERAPY INITIAL EVALUATION ADULT - CRITERIA FOR SKILLED THERAPEUTIC INTERVENTIONS
functional limitations in following categories/predicted duration of therapy intervention/impairments found/risk reduction/prevention/rehab potential/anticipated discharge recommendation/therapy frequency

## 2020-11-28 NOTE — PROGRESS NOTE ADULT - PROBLEM SELECTOR PLAN 3
- Endo consult appreciated, januvia and metformin to be re-instated on discharge to Rehab  - Hemoglobin A1c=8.9% on 18/11/2020  - Hemoglobin A1c 8.9 on Nov 18  - Blood Glucose Monitoring ACHS  - Sliding Scale ACHS

## 2020-11-28 NOTE — PROGRESS NOTE ADULT - PROBLEM SELECTOR PLAN 2
- patient is on eliquis and metoprolol at home; however spoke with daughter Evelina over the phone ([phone]) this morning, and she expressed concern of continuing Eliquis given frequent history of falls; after risk/benefit discussion, discontinued Eliquis. Patient to continue on aspirin and plavix. Discussed with patient at bedside who also agrees to the plan.   - EKG in ED showed afib with RVR   - echo 11/20 showed EF 55%, concentric LVH, pulmonary HTN (RVSP: 51mmHg)  - Metoprolol held for now due to low BP   - UA negative, CXR negative  - hypotension likely medication-induced. Continue to hold.

## 2020-11-28 NOTE — PROGRESS NOTE ADULT - PROBLEM SELECTOR PLAN 1
- P/w mechanical fall at home while returning from bathroom  - no LOC, head trauma, palpitations, chest pain, visual disturbances   - CTH showed no hemorrhage or fracture  - X-rays knee and pelvis showed no fractures  - Daughter agreeable for rehab now  - Pt eval: recommended [facility]   - CM consulted  - SW consulted: awaiting auth for rehab  -Covid-19 PCR to be repeated 11/29

## 2020-11-28 NOTE — PROGRESS NOTE ADULT - SUBJECTIVE AND OBJECTIVE BOX
Patient is a 80y old  Female who presents with a chief complaint of fall (2020 10:50)      INTERVAL HPI/OVERNIGHT EVENTS: Pt  currently complaining of poor/lack of appetite; otherwise denies complaints; no other overnight issues      T(C): 36.7 (20 @ 14:45), Max: 36.8 (20 @ 05:15)  HR: 68 (20 @ 14:45) (68 - 96)  BP: 108/79 (20 @ 14:45) (108/79 - 126/60)  RR: 16 (20 @ 14:45) (16 - 18)  SpO2: 97% (20 @ 14:45) (96% - 97%)      REVIEW OF SYSTEMS: denies fever, chills, SOB, palpitations, chest pain, abdominal pain, nausea, vomitting, diarrhea, constipation, dizziness    MEDICATIONS  (STANDING):  aspirin  chewable 81 milliGRAM(s) Oral daily  atorvastatin 40 milliGRAM(s) Oral at bedtime  dextrose 5%. 1000 milliLiter(s) (100 mL/Hr) IV Continuous <Continuous>  donepezil 10 milliGRAM(s) Oral at bedtime  glucagon  Injectable 1 milliGRAM(s) IntraMuscular once  insulin lispro (ADMELOG) corrective regimen sliding scale   SubCutaneous three times a day before meals  nystatin Powder 1 Application(s) Topical two times a day  sodium chloride 0.9%. 1000 milliLiter(s) (65 mL/Hr) IV Continuous <Continuous>    MEDICATIONS  (PRN):  acetaminophen   Tablet .. 650 milliGRAM(s) Oral every 6 hours PRN Mild Pain (1 - 3)      PHYSICAL EXAM:  GENERAL: NAD,  well-developed  HEAD:  Atraumatic, Normocephalic  EYES:  conjunctiva and sclera clear  ENMT:  Moist mucous membranes  NECK: Supple, No JVD  NERVOUS SYSTEM:  Alert & Oriented x2, Fair concentration  CHEST/LUNG: Clear to auscultation bilaterally; No rales, rhonchi, wheezing, or rubs  HEART: Regular rate and rhythm; No murmurs, rubs, or gallops  ABDOMEN: Soft, Nontender, Nondistended; Bowel sounds present  EXTREMITIES:  2+ Peripheral Pulses, No clubbing, cyanosis, 1+ bilateral pitting edema; congenital hand/ finger abnormalities stable    LABS:                        12.1   9.84  )-----------( 295      ( 2020 07:43 )             38.1         141  |  106  |  9   ----------------------------<  178<H>  4.1   |  27  |  0.69    Ca    9.0      2020 06:43  Phos  2.7       Mg     2.3         TPro  6.5  /  Alb  2.2<L>  /  TBili  0.5  /  DBili  x   /  AST  14  /  ALT  12  /  AlkPhos  73        Urinalysis Basic - ( 2020 20:33 )    Color: Yellow / Appearance: Slightly Turbid / S.010 / pH: x  Gluc: x / Ketone: Negative  / Bili: Negative / Urobili: Negative   Blood: x / Protein: 15 / Nitrite: Negative   Leuk Esterase: Trace / RBC: 5-10 /HPF / WBC 0-2 /HPF   Sq Epi: x / Non Sq Epi: Many /HPF / Bacteria: Trace /HPF      CAPILLARY BLOOD GLUCOSE      POCT Blood Glucose.: 173 mg/dL (2020 16:17)  POCT Blood Glucose.: 181 mg/dL (2020 11:53)  POCT Blood Glucose.: 170 mg/dL (2020 07:43)  POCT Blood Glucose.: 142 mg/dL (2020 21:17)  POCT Blood Glucose.: 142 mg/dL (2020 21:15)        Urinalysis Basic - ( 2020 20:33 )    Color: Yellow / Appearance: Slightly Turbid / S.010 / pH: x  Gluc: x / Ketone: Negative  / Bili: Negative / Urobili: Negative   Blood: x / Protein: 15 / Nitrite: Negative   Leuk Esterase: Trace / RBC: 5-10 /HPF / WBC 0-2 /HPF   Sq Epi: x / Non Sq Epi: Many /HPF / Bacteria: Trace /HPF

## 2020-11-28 NOTE — PROGRESS NOTE ADULT - ASSESSMENT
Patient is a 80 year old female with PMH of HTN, HLD, DM, vascular dementia, and paroxysmal AF on Eliquis who presents to the ED after a fall. CTH showed no evidence for calvarial fracture or acute intracranial hemorrhage, an age-indeterminate lacunar infarct involving the left frontal lobe. X-ray knee and pelvis showed no fractures. CXR was clear. Labs significant for mild leucocytosis 12k and INR 1.6. Patient admitted to medicine s/p fall, pending rehab placement. After benefit/risks discussion, Eliquis discontinued due to patient's history of frequent falls; daughter aware of increased risk of stroke.

## 2020-11-28 NOTE — CONSULT NOTE ADULT - ASSESSMENT
79 yo female with h/o multiple comorbidities including h/o DM type 2, HLD, dementia, paroxysmal A fib admitted s/p fall    endocrinology consulted for glycemic manamgenet    DM type 2  HbA1C 8.9%  uncontrolled  home regimen:   januvia 50mg daily, pioglitazone 30mg daily    recommendations:  glucoses controlled at present  - continue low dose ss  - reassess based on requirements  - goal inpatient glucose range: 140-180mg/dl    tentative discharge regimen:  increase sitagliptin to 100mg daily, start metformin 500mg bid    s/p fall  mechanical  CT head without acute pathology, imaging neg for acute fractures  A fib, TSH wnl  on metoprolol outpatient    HLD  LDL 60s  TG controlled  continue atorvastatin 40mg daily    Discussed with primary team  Please call Endocrine- [phone]- Dr Syl Olivas as needed     79 yo female with h/o multiple comorbidities including h/o DM type 2, HLD, dementia, paroxysmal A fib admitted s/p fall    endocrinology consulted for glycemic manamgenet    DM type 2  HbA1C 8.9%  uncontrolled  home regimen:   januvia 50mg daily, pioglitazone 30mg daily, metformin 500mg bid    recommendations:  glucoses controlled at present  - continue low dose ss  - reassess based on requirements  - goal inpatient glucose range: 140-180mg/dl    tentative discharge regimen:  increase sitagliptin to 100mg daily, increase to metformin 1000mg bid  needs follow up with endocrine outpatient    s/p fall  mechanical  CT head without acute pathology, imaging neg for acute fractures  A fib, TSH wnl  on metoprolol outpatient    HLD  LDL 60s  TG controlled  continue atorvastatin 40mg daily    Discussed with primary team  Please call Endocrine- [phone]- Dr Syl Olivas as needed

## 2020-11-28 NOTE — CONSULT NOTE ADULT - SUBJECTIVE AND OBJECTIVE BOX
Patient is a 80y old  Female who presents with a chief complaint of fall (2020 18:34)      HPI:  Patient is a 80 year old female with PMH of HTN, HLD, DM, vascular dementia, and paroxysmal AF on Eliquis who presents to the ED after a fall. Patient has dementia and unable to provide any history. She says she left the hospital yesterday and does not know why she is here. Per daughter, patient had a fall as she was coming from the restroom, she slipped over fall and fell. Patient reports left knee pain but no other complaints. Patient was just discharged from [facility] 2 days ago and was admitted for pneumonia (treated with rocephin and azitho) and CHF exacerbation. At that time, she was suggested rehab but daughter refused. No LOC or head trauma reported.  Endocrine consulted for glycemic management  home regimen includes januvia 50mg daily and pioglitazone 30mg daily  on soft diet at present    PAST MEDICAL & SURGICAL HISTORY:  Afib    Dementia    HLD (hyperlipidemia)    HTN (hypertension)    DM (diabetes mellitus)    H/O:            MEDICATIONS  (STANDING):  aspirin  chewable 81 milliGRAM(s) Oral daily  atorvastatin 40 milliGRAM(s) Oral at bedtime  dextrose 5%. 1000 milliLiter(s) (100 mL/Hr) IV Continuous <Continuous>  donepezil 10 milliGRAM(s) Oral at bedtime  glucagon  Injectable 1 milliGRAM(s) IntraMuscular once  insulin lispro (ADMELOG) corrective regimen sliding scale   SubCutaneous three times a day before meals  nystatin Powder 1 Application(s) Topical two times a day  sodium chloride 0.9%. 1000 milliLiter(s) (65 mL/Hr) IV Continuous <Continuous>    MEDICATIONS  (PRN):  acetaminophen   Tablet .. 650 milliGRAM(s) Oral every 6 hours PRN Mild Pain (1 - 3)      FAMILY HISTORY:      SOCIAL HISTORY:      REVIEW OF SYSTEMS:  unable to obtain,  h/o dementia  AAOx 1      Vital Signs Last 24 Hrs  T(C): 36.8 (2020 05:15), Max: 36.8 (2020 05:15)  T(F): 98.2 (2020 05:15), Max: 98.2 (2020 05:15)  HR: 96 (2020 05:15) (66 - 96)  BP: 126/60 (2020 05:15) (104/70 - 143/60)  BP(mean): --  RR: 18 (2020 05:15) (17 - 18)  SpO2: 96% (2020 05:15) (95% - 96%)      Constitutional:    NC/AT:    HEENT:    Neck:  No JVD    Respiratory:  Clear     Cardiovascular:  RR     Gastrointestinal: Soft    Extremities: without cyanosis    Neurological:  non focal        LABS:                        12.1   9.84  )-----------( 295      ( 2020 07:43 )             38.1         141  |  106  |  9   ----------------------------<  178<H>  4.1   |  27  |  0.69    Ca    9.0      2020 06:43  Phos  2.7       Mg     2.3         TPro  6.5  /  Alb  2.2<L>  /  TBili  0.5  /  DBili  x   /  AST  14  /  ALT  12  /  AlkPhos  73  1127    CARDIAC MARKERS ( 2020 14:36 )  <0.015 ng/mL / x     / x     / x     / x          PT/INR - ( 2020 14:36 )   PT: 19.4 sec;   INR: 1.67 ratio         PTT - ( 2020 14:36 )  PTT:39.4 sec  Urinalysis Basic - ( 2020 20:33 )    Color: Yellow / Appearance: Slightly Turbid / S.010 / pH: x  Gluc: x / Ketone: Negative  / Bili: Negative / Urobili: Negative   Blood: x / Protein: 15 / Nitrite: Negative   Leuk Esterase: Trace / RBC: 5-10 /HPF / WBC 0-2 /HPF   Sq Epi: x / Non Sq Epi: Many /HPF / Bacteria: Trace /HPF      CAPILLARY BLOOD GLUCOSE      POCT Blood Glucose.: 170 mg/dL (2020 07:43)  POCT Blood Glucose.: 142 mg/dL (2020 21:17)  POCT Blood Glucose.: 142 mg/dL (2020 21:15)  POCT Blood Glucose.: 168 mg/dL (2020 16:42)  POCT Blood Glucose.: 266 mg/dL (2020 13:27)      RADIOLOGY & ADDITIONAL STUDIES:

## 2020-11-29 NOTE — DISCHARGE NOTE PROVIDER - CARE PROVIDER_API CALL
Camilo Alicia  Internal Medicine  [address] [place] [address]  Phone: [phone]  Fax: [phone]  Follow Up Time: 1 week

## 2020-11-29 NOTE — PROGRESS NOTE ADULT - PROBLEM SELECTOR PLAN 3
- Endo consult appreciated, januvia and metformin to be re-instated on discharge to Rehab  - Hemoglobin A1c 8.9 on 11/18/20  - Blood Glucose Monitoring ACHS  - Sliding Scale ACHS

## 2020-11-29 NOTE — PROGRESS NOTE ADULT - PROBLEM SELECTOR PLAN 2
- patient is on eliquis and metoprolol at home; however spoke with daughter Evelina over the phone ([phone]) this morning, and she expressed concern of continuing Eliquis given frequent history of falls; after risk/benefit discussion, discontinued Eliquis. Patient to continue on aspirin and plavix. Discussed with patient at bedside who also agrees to the plan.   - EKG in ED showed afib with RVR   - echo 11/20 showed EF 55%, concentric LVH, pulmonary HTN (RVSP: 51mmHg)  - Metoprolol held for now due to low BP   - UA negative, CXR negative  - hypotension likely medication-induced. Continue to hold.

## 2020-11-29 NOTE — PROGRESS NOTE ADULT - SUBJECTIVE AND OBJECTIVE BOX
Interval Events:    tolerating po intake  poc glucose mildly elevated,  requiring low doses on sliding scale    Allergies    No Known Allergies    Intolerances      Endocrine/Metabolic Medications:  atorvastatin 40 milliGRAM(s) Oral at bedtime  glucagon  Injectable 1 milliGRAM(s) IntraMuscular once  insulin lispro (ADMELOG) corrective regimen sliding scale   SubCutaneous three times a day before meals      Vital Signs Last 24 Hrs  T(C): 36.5 (29 Nov 2020 05:05), Max: 36.7 (28 Nov 2020 14:45)  T(F): 97.7 (29 Nov 2020 05:05), Max: 98.1 (28 Nov 2020 14:45)  HR: 88 (29 Nov 2020 05:05) (68 - 105)  BP: 141/64 (29 Nov 2020 05:05) (108/79 - 141/64)  BP(mean): --  RR: 17 (29 Nov 2020 05:05) (16 - 17)  SpO2: 95% (29 Nov 2020 05:05) (95% - 97%)      PHYSICAL EXAM    Constitutional:    NC/AT:    HEENT:    Neck:  No JVD    Respiratory:  Clear     Cardiovascular:  RR     Gastrointestinal: Soft    Extremities: without cyanosis    Neurological:  non focal    LABS                        13.3   9.97  )-----------( 309      ( 29 Nov 2020 07:55 )             42.5                               142    |  105    |  8                   Calcium: 8.6   / iCa: x      (11-29 @ 07:55)    ----------------------------<  189       Magnesium: x                                3.6     |  24     |  0.64             Phosphorous: x          CAPILLARY BLOOD GLUCOSE      POCT Blood Glucose.: 192 mg/dL (29 Nov 2020 11:31)  POCT Blood Glucose.: 190 mg/dL (29 Nov 2020 07:55)  POCT Blood Glucose.: 154 mg/dL (28 Nov 2020 21:29)  POCT Blood Glucose.: 173 mg/dL (28 Nov 2020 16:17)        Assesment/plan          79 yo female with h/o multiple comorbidities including h/o DM type 2, HLD, dementia, paroxysmal A fib admitted s/p fall    endocrinology consulted for glycemic manamgenet    DM type 2  HbA1C 8.9%  uncontrolled  home regimen:   januvia 50mg daily, pioglitazone 30mg daily, metformin 500mg bid    recommendations:  glucoses mostly controlled  - continue low dose ss  - reassess based on requirements  - goal inpatient glucose range: 140-180mg/dl    tentative discharge regimen:  increase sitagliptin to 100mg daily, increase to metformin 1000mg bid  needs follow up for glucoses outpatient    s/p fall  mechanical  CT head without acute pathology, imaging neg for acute fractures  A fib, TSH wnl  on metoprolol outpatient    HLD  LDL 60s  TG controlled  continue atorvastatin 40mg daily    Discussed with primary team  will sign off, please call as needed  Please call Endocrine- [phone]- Dr Syl Olivas as needed       Interval Events:    tolerating po intake  poc glucose mildly elevated,  requiring low doses on sliding scale    Allergies    No Known Allergies    Intolerances      Endocrine/Metabolic Medications:  atorvastatin 40 milliGRAM(s) Oral at bedtime  glucagon  Injectable 1 milliGRAM(s) IntraMuscular once  insulin lispro (ADMELOG) corrective regimen sliding scale   SubCutaneous three times a day before meals      Vital Signs Last 24 Hrs  T(C): 36.5 (29 Nov 2020 05:05), Max: 36.7 (28 Nov 2020 14:45)  T(F): 97.7 (29 Nov 2020 05:05), Max: 98.1 (28 Nov 2020 14:45)  HR: 88 (29 Nov 2020 05:05) (68 - 105)  BP: 141/64 (29 Nov 2020 05:05) (108/79 - 141/64)  BP(mean): --  RR: 17 (29 Nov 2020 05:05) (16 - 17)  SpO2: 95% (29 Nov 2020 05:05) (95% - 97%)      PHYSICAL EXAM    Constitutional:    NC/AT:    HEENT:    Neck:  No JVD    Respiratory:  Clear     Cardiovascular:  RR     Gastrointestinal: Soft    Extremities: without cyanosis    Neurological:  non focal    LABS                        13.3   9.97  )-----------( 309      ( 29 Nov 2020 07:55 )             42.5                               142    |  105    |  8                   Calcium: 8.6   / iCa: x      (11-29 @ 07:55)    ----------------------------<  189       Magnesium: x                                3.6     |  24     |  0.64             Phosphorous: x          CAPILLARY BLOOD GLUCOSE      POCT Blood Glucose.: 192 mg/dL (29 Nov 2020 11:31)  POCT Blood Glucose.: 190 mg/dL (29 Nov 2020 07:55)  POCT Blood Glucose.: 154 mg/dL (28 Nov 2020 21:29)  POCT Blood Glucose.: 173 mg/dL (28 Nov 2020 16:17)        Assesment/plan          81 yo female with h/o multiple comorbidities including h/o DM type 2, HLD, dementia, paroxysmal A fib admitted s/p fall    endocrinology consulted for glycemic manamgenet    DM type 2  HbA1C 8.9%  uncontrolled  home regimen:   januvia 50mg daily, pioglitazone 30mg daily, metformin 500mg bid    recommendations:  glucoses mostly controlled  - continue low dose ss  - reassess based on requirements  - goal inpatient glucose range: 140-180mg/dl    tentative discharge regimen:  increase sitagliptin to 100mg daily, increase to metformin 1000mg bid if eGFR  remains >45  stop pioglitazone on discharge  needs follow up for glucoses outpatient    s/p fall  mechanical  CT head without acute pathology, imaging neg for acute fractures  A fib, TSH wnl  on metoprolol outpatient    HLD  LDL 60s  TG controlled  continue atorvastatin 40mg daily    Discussed with primary team  will sign off, please call as needed  Please call Endocrine- [phone]- Dr Syl Olivas as needed

## 2020-11-29 NOTE — DISCHARGE NOTE PROVIDER - NSDCPNSUBOBJ_GEN_ALL_CORE
Patient is a 80y old  Female who presents with a chief complaint of fall (29 Nov 2020 19:14)    Patient was seen and examined at bedside   Denies any complains     INTERVAL HPI/OVERNIGHT EVENTS:  T(C): 37.1 (11-30-20 @ 16:12), Max: 37.1 (11-30-20 @ 16:12)  HR: 78 (11-30-20 @ 16:12) (78 - 106)  BP: 142/96 (11-30-20 @ 16:12) (129/70 - 142/96)  RR: 18 (11-30-20 @ 16:12) (18 - 18)  SpO2: 99% (11-30-20 @ 16:12) (98% - 100%)  Wt(kg): --  I&O's Summary    29 Nov 2020 07:01  -  30 Nov 2020 07:00  --------------------------------------------------------  IN: 0 mL / OUT: 1000 mL / NET: -1000 mL        REVIEW OF SYSTEMS: denies fever, chills, SOB, palpitations, chest pain, abdominal pain, nausea, vomittng, diarrhea, constipation, dizziness    MEDICATIONS  (STANDING):  aspirin  chewable 81 milliGRAM(s) Oral daily  atorvastatin 40 milliGRAM(s) Oral at bedtime  clopidogrel Tablet 75 milliGRAM(s) Oral daily  dextrose 5%. 1000 milliLiter(s) (100 mL/Hr) IV Continuous <Continuous>  donepezil 10 milliGRAM(s) Oral at bedtime  glucagon  Injectable 1 milliGRAM(s) IntraMuscular once  insulin lispro (ADMELOG) corrective regimen sliding scale   SubCutaneous three times a day before meals  losartan 25 milliGRAM(s) Oral daily  metoprolol succinate ER 25 milliGRAM(s) Oral daily  nystatin Powder 1 Application(s) Topical two times a day  sodium chloride 0.9%. 1000 milliLiter(s) (65 mL/Hr) IV Continuous <Continuous>    MEDICATIONS  (PRN):  acetaminophen   Tablet .. 650 milliGRAM(s) Oral every 6 hours PRN Mild Pain (1 - 3)  melatonin 3 milliGRAM(s) Oral at bedtime PRN Sleep  OLANZapine 2.5 milliGRAM(s) Oral daily PRN agitation  ondansetron    Tablet 4 milliGRAM(s) Oral every 8 hours PRN Nausea and/or Vomiting      PHYSICAL EXAM:  GENERAL: NAD  NERVOUS SYSTEM:  Alert & Oriented X2, Good concentration; no focal deficit   CHEST/LUNG: Clear to auscultation bilaterally; No rales, rhonchi, wheezing, or rubs  HEART: Regular rate and rhythm; No murmurs, rubs, or gallops  ABDOMEN: Soft, Nontender, Nondistended; Bowel sounds present  EXTREMITIES: congential anomalies of hand, 2+ Peripheral Pulses, No clubbing, cyanosis, or edema  SKIN: No rashes or lesions    LABS:                        13.2   9.66  )-----------( 349      ( 30 Nov 2020 07:58 )             41.9     145  |  109<H>  |  7   ----------------------------<  204<H>  3.6   |  28  |  0.59    Ca    8.7      30 Nov 2020 07:58          CAPILLARY BLOOD GLUCOSE      POCT Blood Glucose.: 195 mg/dL (30 Nov 2020 11:43)  POCT Blood Glucose.: 205 mg/dL (30 Nov 2020 07:43)  POCT Blood Glucose.: 209 mg/dL (29 Nov 2020 21:58)        A/P:  Afib  Recurrent falls   Dementia  DM  HTN      Plan:  Eliquis discontinued due to risk of bleeding, cont aspirin and plavix  Appreciate endocrine consult  DC order rec to be updated, will update NH tomorrow, discussed with PGY3 resident Dr Mcknight   Cont Metoprolol and Lasix   Stable to be discharged

## 2020-11-29 NOTE — DISCHARGE NOTE PROVIDER - NSDCCPCAREPLAN_GEN_ALL_CORE_FT
PRINCIPAL DISCHARGE DIAGNOSIS  Diagnosis: Fall  Assessment and Plan of Treatment: You presented to the ED after a fall. This could be due to a variety of reasons including old age, neurological problems, heart problems, or just a mechanical reason for which your require more assistance to ambulate. You had no loss of consciousness or head trauma with CT scan of the head negative for any bleeds or strokes. You did not have any palpitations, or chest pain with EKG being negative for any drastic rise in HR or heart attack. Physical therapy evaluated you and recommened JESE. You are advised to continue PT to regain strength in your activities of daily living.        SECONDARY DISCHARGE DIAGNOSES  Diagnosis: HTN (hypertension)  Assessment and Plan of Treatment: You have high blood pressure, for which you have been started on medications. It is important to continue to take your medications on time,  monitor your blood pressure at home, keep a log of your home readings and follow up with your Primary Care Physician. As per AHA/ACC guidelines, it is important to adhere to a DASH Diet of fresh fruits, vegetables, lean meats such as poultry and fish, and whole wheat carbs. 30 minutes of aerobic exercise per day 3-4 times a week, reducing salt intake <1.5g/day, and cutting down on highly processed foods are also shown to reduce BP. Uncontrolled BP may result in organ damage to your eyes, brain, heart, and kidneys by causing strokes, heart attacks, kidney failure, and bleeds in your eye.      Diagnosis: DM (diabetes mellitus)  Assessment and Plan of Treatment: You have Type 2 diabetes and were found to have a Hemoglobin A1c of 8.9% which shows uncontrolled glucose levels in your body over the past 3 months. However, a normal HbA1c is 5.5%, and you must make some lifestyle choices such as exercise and weight loss in order to make your body less insulin resistant. You are advised to eat foods that are low glycemic index which include beans and complex carbs, and to avoid high GI foods including white rice and potatoes. Uncontrolled sugars can lead to blindness, kidney failure, and contribute to diseases such as heart attacks and strokes. Your were not started on any medications at this hospital visit, and you are advised to keep your Primary Care Physician appointments in order to monitor and change medications as necessary.      Diagnosis: Afib  Assessment and Plan of Treatment: You were previously diagnosed to have an arrythmia of your heart called atrial fibrillation which means you heart may frequently beat faster than 150 beats per minute. You have been continued on medications which help control your heart rate including lopressor, which helped bring it back to a regular rate. After a family discussion of the risks and benefits, you were stopped from the anticoagulant called eliquis which prevents rapid beating of your heart from forming and dislodging a clot which could potentially cause strokes or clots in your lungs. This is due to your higher risk of bleeding than normal.       Diagnosis: Dementia  Assessment and Plan of Treatment: You have a history of dementia which is an irreversible loss of brain matter due to chronic blood loss or age related changes to the brain. Unfortunately there is no cure or standard way to prevent the progression of dementia other than controlling risk factors such as your diet, and keeping your blood sugars and fats within a normal range. You are recommended to continue these medications.       PRINCIPAL DISCHARGE DIAGNOSIS  Diagnosis: Fall  Assessment and Plan of Treatment: You presented to the ED after a fall. This could be due to a variety of reasons including old age, neurological problems, heart problems, or just a mechanical reason for which your require more assistance to ambulate. You had no loss of consciousness or head trauma with CT scan of the head negative for any bleeds or strokes. You did not have any palpitations or chest pain with EKG being negative for any drastic rise in HR or heart attack. Physical therapy evaluated you and recommened JESE. You are advised to continue PT to regain strength in your activities of daily living.        SECONDARY DISCHARGE DIAGNOSES  Diagnosis: Afib  Assessment and Plan of Treatment: You were previously diagnosed to have an arrythmia of your heart called atrial fibrillation which means you heart may frequently beat faster than 150 beats per minute. You have been continued on medications which help control your heart rate including lopressor, which helped bring it back to a regular rate. After a family discussion of the risks and benefits, you were stopped from the anticoagulant called Eliquis which prevents rapid beating of your heart from forming and dislodging a clot which could potentially cause strokes.. This is due to your higher risk of bleeding than normal.   Please follow up with your PCP.    Diagnosis: DM (diabetes mellitus)  Assessment and Plan of Treatment: You have Type 2 diabetes and were found to have a Hemoglobin A1c of 8.9% which shows uncontrolled glucose levels in your body over the past 3 months. However, a normal HbA1c is 5.5%, and you must make some lifestyle choices such as exercise and weight loss in order to make your body less insulin resistant. You are advised to eat foods that are low glycemic index which include beans and complex carbs, and to avoid high glycemic index foods including white rice and potatoes. Uncontrolled sugars can lead to blindness, kidney failure, and contribute to diseases such as heart attacks and strokes. Your were not started on any medications at this hospital visit and you are advised to follow up with your PCP to monitor and change medications as necessary.    Diagnosis: HTN (hypertension)  Assessment and Plan of Treatment: You have history of high blood pressure. At home you were taking Losartan and Metoprolol. On this admission, you were taking lower dose of Losartan and kept on same dose of Metoprolol.    As per AHA/ACC guidelines, it is important to adhere to a Recommend the DASH Diet. This diet emphasizes vegetables, fruits, and fat-free or low-fat dairy products.  Includes whole grains, fish, poultry, beans, seeds, nuts, and vegetable oils. Please limit sodium, sweets, sugary beverages, and red meat Diet of fresh fruits, vegetables, lean meats such as poultry and fish, and whole wheat carbs. 30 minutes of aerobic exercise per day 3-4 times a week, reducing salt intake <1.5g/day, and cutting down on highly processed foods are also shown to reduce BP. Uncontrolled BP may result in organ damage to your eyes, brain, heart, and kidneys by causing strokes, heart attacks, kidney failure, and bleeds in your eye.  Please take medications as prescribed as we have made some changes to your regimen. Losartan 25 mg daily and Metoprolol 25 mg daily.   Please follow up with PCP.    Diagnosis: Dementia  Assessment and Plan of Treatment: You have a history of dementia which is an irreversible loss of brain matter due to chronic blood loss or age related changes to the brain. Unfortunately there is no cure or standard way to prevent the progression of dementia other than controlling risk factors such as your diet, and keeping your blood sugars and fats within a normal range.   Please follow up with PCP.       PRINCIPAL DISCHARGE DIAGNOSIS  Diagnosis: Fall  Assessment and Plan of Treatment: You presented to the ED after a fall. This could be due to a variety of reasons including old age, neurological problems, heart problems, or just a mechanical reason for which your require more assistance to ambulate. You had no loss of consciousness or head trauma with CT scan of the head negative for any bleeds or strokes. You did not have any palpitations or chest pain with EKG being negative for any drastic rise in HR or heart attack. Physical therapy evaluated you and recommened JESE. You are advised to continue PT to regain strength in your activities of daily living.        SECONDARY DISCHARGE DIAGNOSES  Diagnosis: Afib  Assessment and Plan of Treatment: You were previously diagnosed to have an arrythmia of your heart called atrial fibrillation which means you heart may frequently beat faster than 150 beats per minute. You have been continued on medications which help control your heart rate including lopressor, which helped bring it back to a regular rate. After a family discussion of the risks and benefits, you were stopped from the anticoagulant called Eliquis which prevents rapid beating of your heart from forming and dislodging a clot which could potentially cause strokes. This is due to your higher risk of bleeding than normal.   Please follow up with your PCP.    Diagnosis: DM (diabetes mellitus)  Assessment and Plan of Treatment: You have Type 2 diabetes and were found to have a Hemoglobin A1c of 8.9% which shows uncontrolled glucose levels in your body over the past 3 months. However, a normal HbA1c is 5.5%, and you must make some lifestyle choices such as exercise and weight loss in order to make your body less insulin resistant. You are advised to eat foods that are low glycemic index which include beans and complex carbs, and to avoid high glycemic index foods including white rice and potatoes. Uncontrolled sugars can lead to blindness, kidney failure, and contribute to diseases such as heart attacks and strokes. Your were not started on any medications at this hospital visit and you are advised to follow up with your PCP to monitor and change medications as necessary.    Diagnosis: HTN (hypertension)  Assessment and Plan of Treatment: You have history of high blood pressure. At home you were taking Losartan and Metoprolol. On this admission, you were taking lower dose of Losartan and kept on same dose of Metoprolol.    As per AHA/ACC guidelines, it is important to adhere to a Recommend the DASH Diet. This diet emphasizes vegetables, fruits, and fat-free or low-fat dairy products.  Includes whole grains, fish, poultry, beans, seeds, nuts, and vegetable oils. Please limit sodium, sweets, sugary beverages, and red meat Diet of fresh fruits, vegetables, lean meats such as poultry and fish, and whole wheat carbs. 30 minutes of aerobic exercise per day 3-4 times a week, reducing salt intake <1.5g/day, and cutting down on highly processed foods are also shown to reduce BP. Uncontrolled BP may result in organ damage to your eyes, brain, heart, and kidneys by causing strokes, heart attacks, kidney failure, and bleeds in your eye.  Please take medications as prescribed as we have made some changes to your regimen. Losartan 25 mg daily and Metoprolol 25 mg daily.   Please follow up with PCP.    Diagnosis: Dementia  Assessment and Plan of Treatment: You have a history of dementia which is an irreversible loss of brain matter due to chronic blood loss or age related changes to the brain. Unfortunately there is no cure or standard way to prevent the progression of dementia other than controlling risk factors such as your diet, and keeping your blood sugars and fats within a normal range.   Please follow up with PCP.       PRINCIPAL DISCHARGE DIAGNOSIS  Diagnosis: Fall  Assessment and Plan of Treatment: You presented to the ED after a fall. This could be due to a variety of reasons including old age, neurological problems, heart problems, or just a mechanical reason for which your require more assistance to ambulate. You had no loss of consciousness or head trauma with CT scan of the head negative for any bleeds or strokes. You did not have any palpitations or chest pain with EKG being negative for any drastic rise in HR or heart attack. Physical therapy evaluated you and recommened JESE. You are advised to continue PT to regain strength in your activities of daily living.        SECONDARY DISCHARGE DIAGNOSES  Diagnosis: Atrial fibrillation  Assessment and Plan of Treatment: You were previously diagnosed to have an arrythmia of your heart called atrial fibrillation which means you heart may frequently beat faster than 150 beats per minute. You have been continued on medications which help control your heart rate including Metoprolol, which helped bring it back to a regular rate. After a family discussion of the risks and benefits, you were stopped from the anticoagulant called Eliquis which prevents rapid beating of your heart from forming and dislodging a clot which could potentially cause strokes. This is due to your higher risk of bleeding than normal. You were started on Plavix and continued on Aspirin instead of Eliquis.  Please follow up with your PCP.    Diagnosis: DM (diabetes mellitus)  Assessment and Plan of Treatment: You have Type 2 diabetes and were found to have a Hemoglobin A1c of 8.9% which shows uncontrolled glucose levels in your body over the past 3 months. However, a normal HbA1c is 5.5%, and you must make some lifestyle choices such as exercise and weight loss in order to make your body less insulin resistant. You are advised to eat foods that are low glycemic index which include beans and complex carbs, and to avoid high glycemic index foods including white rice and potatoes. Uncontrolled sugars can lead to blindness, kidney failure, and contribute to diseases such as heart attacks and strokes. Your were not started on any medications at this hospital visit and you are advised to follow up with your PCP to monitor and change medications as necessary.    Diagnosis: HTN (hypertension)  Assessment and Plan of Treatment: You have history of high blood pressure. At home you were taking Losartan and Metoprolol. On this admission, you were taking Lasix and Metoprolol.    As per AHA/ACC guidelines, it is important to adhere to a Recommend the DASH Diet. This diet emphasizes vegetables, fruits, and fat-free or low-fat dairy products.  Includes whole grains, fish, poultry, beans, seeds, nuts, and vegetable oils. Please limit sodium, sweets, sugary beverages, and red meat Diet of fresh fruits, vegetables, lean meats such as poultry and fish, and whole wheat carbs. 30 minutes of aerobic exercise per day 3-4 times a week, reducing salt intake <1.5g/day, and cutting down on highly processed foods are also shown to reduce BP. Uncontrolled BP may result in organ damage to your eyes, brain, heart, and kidneys by causing strokes, heart attacks, kidney failure, and bleeds in your eye.  Please take medications as prescribed as we have made some changes to your regimen. Your Losartan and Amlodipine were discontinued due to low blood pressure. Please continue taking Metoprolol 25 mg daily and Lasix 40 mg daily.    Please follow up with PCP.    Diagnosis: Dementia  Assessment and Plan of Treatment: You have a history of dementia which is an irreversible loss of brain matter due to chronic blood loss or age related changes to the brain. Unfortunately there is no cure or standard way to prevent the progression of dementia other than controlling risk factors such as your diet, and keeping your blood sugars and fats within a normal range.   Please follow up with PCP.       PRINCIPAL DISCHARGE DIAGNOSIS  Diagnosis: Fall  Assessment and Plan of Treatment: You presented to the ED after a fall. This could be due to a variety of reasons including old age, neurological problems, heart problems, or just a mechanical reason for which your require more assistance to ambulate. You had no loss of consciousness or head trauma with CT scan of the head negative for any bleeds or strokes. You did not have any palpitations or chest pain with EKG being negative for any drastic rise in HR or heart attack. Physical therapy evaluated you and recommened JESE. You are advised to continue PT to regain strength in your activities of daily living.        SECONDARY DISCHARGE DIAGNOSES  Diagnosis: Atrial fibrillation  Assessment and Plan of Treatment: You were previously diagnosed to have an arrythmia of your heart called atrial fibrillation which means you heart may frequently beat faster than 150 beats per minute. You have been continued on medications which help control your heart rate including Metoprolol, which helped bring it back to a regular rate. After a family discussion of the risks and benefits, you were stopped from the anticoagulant called Eliquis which prevents rapid beating of your heart from forming and dislodging a clot which could potentially cause strokes. This is due to your higher risk of bleeding than normal. You were started on Plavix and continued on Aspirin instead of Eliquis.  Please follow up with your PCP.    Diagnosis: DM (diabetes mellitus)  Assessment and Plan of Treatment: You have Type 2 diabetes and were found to have a Hemoglobin A1c of 8.9% which shows uncontrolled glucose levels in your body over the past 3 months. However, a normal HbA1c is 5.5%, and you must make some lifestyle choices such as exercise and weight loss in order to make your body less insulin resistant. You are advised to eat foods that are low glycemic index which include beans and complex carbs, and to avoid high glycemic index foods including white rice and potatoes. Uncontrolled sugars can lead to blindness, kidney failure, and contribute to diseases such as heart attacks and strokes. You were seen by endocrinologist who recommended to increase Januvia to100 mg daily, Metformin 1000 mg twice a day and discontinued Pioglitazone.  Please follow up with your  PCP and Endocrinologist to monitor glucose and change medications as necessary.    Diagnosis: HTN (hypertension)  Assessment and Plan of Treatment: You have history of high blood pressure. At home you were taking Losartan and Metoprolol. On this admission, you were taking Lasix and Metoprolol.    As per AHA/ACC guidelines, it is important to adhere to a Recommend the DASH Diet. This diet emphasizes vegetables, fruits, and fat-free or low-fat dairy products.  Includes whole grains, fish, poultry, beans, seeds, nuts, and vegetable oils. Please limit sodium, sweets, sugary beverages, and red meat Diet of fresh fruits, vegetables, lean meats such as poultry and fish, and whole wheat carbs. 30 minutes of aerobic exercise per day 3-4 times a week, reducing salt intake <1.5g/day, and cutting down on highly processed foods are also shown to reduce BP. Uncontrolled BP may result in organ damage to your eyes, brain, heart, and kidneys by causing strokes, heart attacks, kidney failure, and bleeds in your eye.  Please take medications as prescribed as we have made some changes to your regimen. Your Losartan and Amlodipine were discontinued due to low blood pressure. Please continue taking Metoprolol 25 mg daily and Lasix 40 mg daily.    Please follow up with PCP.    Diagnosis: Dementia  Assessment and Plan of Treatment: You have a history of dementia which is an irreversible loss of brain matter due to chronic blood loss or age related changes to the brain. Unfortunately there is no cure or standard way to prevent the progression of dementia other than controlling risk factors such as your diet, and keeping your blood sugars and fats within a normal range.   Please follow up with PCP.

## 2020-11-29 NOTE — PROGRESS NOTE ADULT - SUBJECTIVE AND OBJECTIVE BOX
Time of Visit:  Patient seen and examined.     MEDICATIONS  (STANDING):  aspirin  chewable 81 milliGRAM(s) Oral daily  atorvastatin 40 milliGRAM(s) Oral at bedtime  clopidogrel Tablet 75 milliGRAM(s) Oral daily  dextrose 5%. 1000 milliLiter(s) (100 mL/Hr) IV Continuous <Continuous>  donepezil 10 milliGRAM(s) Oral at bedtime  glucagon  Injectable 1 milliGRAM(s) IntraMuscular once  insulin lispro (ADMELOG) corrective regimen sliding scale   SubCutaneous three times a day before meals  nystatin Powder 1 Application(s) Topical two times a day  sodium chloride 0.9%. 1000 milliLiter(s) (65 mL/Hr) IV Continuous <Continuous>      MEDICATIONS  (PRN):  acetaminophen   Tablet .. 650 milliGRAM(s) Oral every 6 hours PRN Mild Pain (1 - 3)  melatonin 3 milliGRAM(s) Oral at bedtime PRN Sleep  OLANZapine 2.5 milliGRAM(s) Oral daily PRN agitation       Medications up to date at time of exam.      PHYSICAL EXAMINATION:  Patient has no new complaints.  GENERAL: The patient is a well-developed, well-nourished, in no apparent distress.     Vital Signs Last 24 Hrs  T(C): 36.9 (2020 13:11), Max: 36.9 (2020 13:11)  T(F): 98.4 (2020 13:11), Max: 98.4 (2020 13:11)  HR: 89 (2020 13:11) (88 - 105)  BP: 104/56 (2020 13:11) (104/56 - 141/64)  BP(mean): --  RR: 18 (2020 13:11) (16 - 18)  SpO2: 98% (2020 13:11) (95% - 98%)   (if applicable)    Chest Tube (if applicable)    HEENT: Head is normocephalic and atraumatic. Extraocular muscles are intact. Mucous membranes are moist.     NECK: Supple, no palpable adenopathy.    LUNGS: Clear to auscultation, no wheezing, rales, or rhonchi.    HEART: Regular rate and rhythm without murmur.    ABDOMEN: Soft, nontender, and nondistended.  No hepatosplenomegaly is noted.    : No painful voiding, no pelvic pain    EXTREMITIES: Without any cyanosis, clubbing, rash, lesions or edema.    NEUROLOGIC: Awake, demented    SKIN: Warm, dry, good turgor.      LABS:                        13.3   9.97  )-----------( 309      ( 2020 07:55 )             42.5         142  |  105  |  8   ----------------------------<  189<H>  3.6   |  24  |  0.64    Ca    8.6      2020 07:55        Urinalysis Basic - ( 2020 20:33 )    Color: Yellow / Appearance: Slightly Turbid / S.010 / pH: x  Gluc: x / Ketone: Negative  / Bili: Negative / Urobili: Negative   Blood: x / Protein: 15 / Nitrite: Negative   Leuk Esterase: Trace / RBC: 5-10 /HPF / WBC 0-2 /HPF   Sq Epi: x / Non Sq Epi: Many /HPF / Bacteria: Trace /HPF                      MICROBIOLOGY: (if applicable)    RADIOLOGY & ADDITIONAL STUDIES:  EKG:   CXR:  ECHO:    IMPRESSION: 80y Female PAST MEDICAL & SURGICAL HISTORY:  Afib    Dementia    HLD (hyperlipidemia)    HTN (hypertension)    DM (diabetes mellitus)    H/O:      p/w           IMP: This is a  80 year old woman HTN, HLD, DM, vascular dementia, and paroxysmal AF on Eliquis who presents to the ED after a fall. Pat was recently admitted and treated for pna with pul edema.  She improved and was d/c home.  CXR on admission shows improvement .       RECOMMENDATIONS:    -continue diureses  -blood sugar control   -consider holding anticoag due to fall risk  -speak with family/ decided for [place]  -fall precaution   -daughter requested to stop eliquis , ASA and plavis instead   -discussed with daughter Evelina [phone],she rquested rehab eval and endo eval due to elevated A1c  -d/c with attend

## 2020-11-29 NOTE — DISCHARGE NOTE PROVIDER - NSDCMRMEDTOKEN_GEN_ALL_CORE_FT
acetaminophen 325 mg oral tablet: 2 tab(s) orally every 6 hours, As needed, Mild Pain (1 - 3), Moderate Pain (4 - 6)  acetaZOLAMIDE 250 mg oral tablet: 1 tab(s) orally 2 times a day  amLODIPine 5 mg oral tablet: 1 tab(s) orally once a day  apixaban 5 mg oral tablet: 1 tab(s) orally 2 times a day  aspirin 81 mg oral tablet, chewable: 1 tab(s) orally once a day  atorvastatin 40 mg oral tablet: 1 tab(s) orally once a day (at bedtime)  donepezil 10 mg oral tablet: 1 tab(s) orally once a day (at bedtime)  Drisdol 50,000 intl units (1.25 mg) oral capsule: 1 cap(s) orally once a week  furosemide 40 mg oral tablet: 1 tab(s) orally once a day  guaiFENesin 100 mg/5 mL oral liquid: 5 milliliter(s) orally every 6 hours, As needed, Cough  JANUVIA 50 MG TABLET: take 1 tablet by mouth once daily  losartan 50 mg oral tablet: 1 tab(s) orally once a day  metoprolol tartrate 25 mg oral tablet: 12.5 milligram(s) orally 2 times a day   PIOGLITAZONE HCL 30 MG TABLET: take 1 tablet by mouth once daily  POTASSIUM CL ER 10 MEQ TABLET: take 1 tablet by mouth every other day   acetaminophen 325 mg oral tablet: 2 tab(s) orally every 6 hours, As needed, Mild Pain (1 - 3), Moderate Pain (4 - 6)  amLODIPine 5 mg oral tablet: 1 tab(s) orally once a day  apixaban 5 mg oral tablet: 1 tab(s) orally 2 times a day  aspirin 81 mg oral tablet, chewable: 1 tab(s) orally once a day  atorvastatin 40 mg oral tablet: 1 tab(s) orally once a day (at bedtime)  donepezil 10 mg oral tablet: 1 tab(s) orally once a day (at bedtime)  Drisdol 50,000 intl units (1.25 mg) oral capsule: 1 cap(s) orally once a week  furosemide 40 mg oral tablet: 1 tab(s) orally once a day  guaiFENesin 100 mg/5 mL oral liquid: 5 milliliter(s) orally every 6 hours, As needed, Cough  JANUVIA 50 MG TABLET: take 1 tablet by mouth once daily  losartan 100 mg oral tablet: 1 tab(s) orally once a day  POTASSIUM CL ER 10 MEQ TABLET: take 1 tablet by mouth every other day   acetaminophen 325 mg oral tablet: 2 tab(s) orally every 6 hours, As needed, Mild Pain (1 - 3), Moderate Pain (4 - 6)  aspirin 81 mg oral tablet, chewable: 1 tab(s) orally once a day  atorvastatin 40 mg oral tablet: 1 tab(s) orally once a day (at bedtime)  clopidogrel 75 mg oral tablet: 1 tab(s) orally once a day  donepezil 10 mg oral tablet: 1 tab(s) orally once a day (at bedtime)  Drisdol 50,000 intl units (1.25 mg) oral capsule: 1 cap(s) orally once a week  furosemide 40 mg oral tablet: 1 tab(s) orally once a day  JANUVIA 50 MG TABLET: take 1 tablet by mouth once daily  metoprolol succinate 25 mg oral tablet, extended release: 1 tab(s) orally once a day  POTASSIUM CL ER 10 MEQ TABLET: take 1 tablet by mouth every other day

## 2020-11-29 NOTE — PROGRESS NOTE ADULT - PROBLEM SELECTOR PLAN 4
- holding all home BP medications now   - Continue to monitor   - resume meds as needed   - DASH diet

## 2020-11-29 NOTE — PROGRESS NOTE ADULT - ATTENDING COMMENTS
Patient seen and examined at bedside this morning. Offers no complaints at this time, reports having a better appetite today.    77 F with  HTN, HLD, DM, vascular dementia, and paroxysmal AF on Eliquis, s/p witnessed fall, had prior admission recently at which time recommended to go to [facility], however daughter had refused at that time, now agreeing that patient should go to [facility].  Patient still borderline hypotensive, holding BP medications. Will continue with januvia and metformin on discharge as recommended by endocrinology consultation. Repeat COVID sent 11/29. Pending placement.
Patient seen and examined at bedside this morning. Offers no complaints at this time except for lower extremity tenderness/pain.   77 F with  HTN, HLD, DM, vascular dementia, and paroxysmal AF on Eliquis, s/p witnessed fall, had prior admission recently at which time recommended to go to [facility], however daughter had refused at that time, now agreeing that patient should go to [facility]. CT head and X-rays reviewed without acute changes as noted above. Placement pending. Noted to be hypotensive this morning, s/p bolus with BP medications on hold. Repeat CXR unremarkable, and UA still pending.

## 2020-11-29 NOTE — PROGRESS NOTE ADULT - SUBJECTIVE AND OBJECTIVE BOX
PGY-1 Progress Note discussed with attending    PAGER #: [[phone]] TILL 5:00 PM  PLEASE CONTACT ON CALL TEAM:  - On Call Team (Please refer to Ximena) FROM 5:00 PM - 8:30PM  - Nightfloat Team FROM 8:30 -7:30 AM    CHIEF COMPLAINT & BRIEF HOSPITAL COURSE:  80F with PMHx of HTN, HLD, T2DM, vascular dementia, and paroxysmal AF on Eliquis/ rate control who presents to the ED after a fall. Patient has dementia and unable to provide any history. She is baseline A0x1-2, lives at home alone, able to do most ADL's including cooking, cleaning, and paying bills. As per her daughter Ladonna Rosenberg (HCP), patient had a slip and fall as she was coming from the restroom. Patient reports bilateral knee pain but no other complaints. Patient was just discharged from [facility] 2 days ago and was admitted for pneumonia (treated with rocephin and azitho) with exacerbation. At that time, she was suggested rehab but daughter was reluctant. No LOC or head trauma reported. In the ED, afebrile, /56, , RR 17 SP02; 94%; mild wbc increase to 12  noted. EKG showed Afib with RVR; CTH: age indeterminate lacunar infarct, frontal lobe. X-ray bilateral knees showed no fracture. She was admitted for rehab evaluation      INTERVAL HPI/OVERNIGHT EVENTS:   Patient was examined while she was lying in bed, Aox1-2, responding appropriately to questions, in NAD. She has normal bowel and bladder movements, and denies any other acute complaints.     REVIEW OF SYSTEMS:  CONSTITUTIONAL: No fever, weight loss, or fatigue  RESPIRATORY: No cough, wheezing, chills or hemoptysis; No shortness of breath  CARDIOVASCULAR: No chest pain, palpitations, dizziness, or leg swelling  GASTROINTESTINAL: No abdominal pain. No nausea, vomiting, or hematemesis; No diarrhea or constipation. No melena or hematochezia.  GENITOURINARY: No dysuria or hematuria, urinary frequency  NEUROLOGICAL: Numbness/ tingling in bilateral LL's, B/l knee pain, No headaches, memory loss, loss of strength, or tremors  SKIN: No itching, burning, rashes, or lesions     MEDICATIONS  (STANDING):  aspirin  chewable 81 milliGRAM(s) Oral daily  atorvastatin 40 milliGRAM(s) Oral at bedtime  clopidogrel Tablet 75 milliGRAM(s) Oral daily  dextrose 5%. 1000 milliLiter(s) (100 mL/Hr) IV Continuous <Continuous>  donepezil 10 milliGRAM(s) Oral at bedtime  glucagon  Injectable 1 milliGRAM(s) IntraMuscular once  insulin lispro (ADMELOG) corrective regimen sliding scale   SubCutaneous three times a day before meals  nystatin Powder 1 Application(s) Topical two times a day  sodium chloride 0.9%. 1000 milliLiter(s) (65 mL/Hr) IV Continuous <Continuous>    MEDICATIONS  (PRN):  acetaminophen   Tablet .. 650 milliGRAM(s) Oral every 6 hours PRN Mild Pain (1 - 3)  melatonin 3 milliGRAM(s) Oral at bedtime PRN Sleep      Vital Signs Last 24 Hrs  T(C): 36.4 (28 Nov 2020 21:15), Max: 36.8 (28 Nov 2020 05:15)  T(F): 97.5 (28 Nov 2020 21:15), Max: 98.2 (28 Nov 2020 05:15)  HR: 105 (28 Nov 2020 21:15) (68 - 105)  BP: 112/54 (28 Nov 2020 21:15) (108/79 - 126/60)  BP(mean): --  RR: 16 (28 Nov 2020 21:15) (16 - 18)  SpO2: 97% (28 Nov 2020 21:15) (96% - 97%)    PHYSICAL EXAMINATION:  GENERAL: Elderly  lady, moderately built, appears stated age, in NAD  HEAD:  Atraumatic, Normocephalic  EYES:  conjunctiva and sclera clear  NECK: Supple, No JVD  CHEST/LUNG: Clear to auscultation. No rales, rhonchi, wheezing, or rubs  HEART: Regular rate and rhythm; No murmurs, rubs, or gallops  ABDOMEN: Soft, Nontender, Nondistended; Bowel sounds present  NERVOUS SYSTEM:  Alert & Oriented X3,  no  motor or sensory loss.   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)  SKIN: warm dry                        12.1   9.84  )-----------( 295      ( 27 Nov 2020 07:43 )             38.1     11-28    141  |  106  |  9   ----------------------------<  178<H>  4.1   |  27  |  0.69    Ca    9.0      28 Nov 2020 06:43  Phos  2.7     11-27  Mg     2.3     11-27    TPro  6.5  /  Alb  2.2<L>  /  TBili  0.5  /  DBili  x   /  AST  14  /  ALT  12  /  AlkPhos  73  11-27    LIVER FUNCTIONS - ( 27 Nov 2020 07:43 )  Alb: 2.2 g/dL / Pro: 6.5 g/dL / ALK PHOS: 73 U/L / ALT: 12 U/L DA / AST: 14 U/L / GGT: x                   CAPILLARY BLOOD GLUCOSE      RADIOLOGY & ADDITIONAL TESTS:                   PGY-1 Progress Note discussed with attending    PAGER #: [[phone]] TILL 5:00 PM  PLEASE CONTACT ON CALL TEAM:  - On Call Team (Please refer to Ximena) FROM 5:00 PM - 8:30PM  - Nightfloat Team FROM 8:30 -7:30 AM    CHIEF COMPLAINT & BRIEF HOSPITAL COURSE:  80F with PMHx of HTN, HLD, T2DM, vascular dementia, and paroxysmal AF on Eliquis/ rate control who presents to the ED after a fall. Patient has dementia and unable to provide any history. She is baseline A0x1-2, lives at home alone, able to do most ADL's including cooking, cleaning, and paying bills. As per her daughter Ladonna Rosenberg (HCP), patient had a slip and fall as she was coming from the restroom. Patient reports bilateral knee pain but no other complaints. Patient was just discharged from [facility] 2 days ago and was admitted for pneumonia (treated with rocephin and azitho) with exacerbation. At that time, she was suggested rehab but daughter was reluctant. No LOC or head trauma reported. In the ED, afebrile, /56, , RR 17 SP02; 94%; mild wbc increase to 12  noted. EKG showed Afib with RVR; CTH: age indeterminate lacunar infarct, frontal lobe. X-ray bilateral knees showed no fracture. She was admitted for rehab evaluation      INTERVAL HPI/OVERNIGHT EVENTS:   Patient was examined while she was lying in bed, Aox1-2, responding appropriately to questions, in NAD. She has normal bowel and bladder movements, and denies any other acute complaints.     REVIEW OF SYSTEMS:  CONSTITUTIONAL: No fever, weight loss, or fatigue  RESPIRATORY: No cough, wheezing, chills or hemoptysis; No shortness of breath  CARDIOVASCULAR: No chest pain, palpitations, dizziness, or leg swelling  GASTROINTESTINAL: No abdominal pain. No nausea, vomiting, or hematemesis; No diarrhea or constipation. No melena or hematochezia.  GENITOURINARY: No dysuria or hematuria, urinary frequency  NEUROLOGICAL: Numbness/ tingling in bilateral LL's, B/l knee pain, No headaches, memory loss, loss of strength, or tremors  SKIN: No itching, burning, rashes, or lesions     MEDICATIONS  (STANDING):  aspirin  chewable 81 milliGRAM(s) Oral daily  atorvastatin 40 milliGRAM(s) Oral at bedtime  clopidogrel Tablet 75 milliGRAM(s) Oral daily  dextrose 5%. 1000 milliLiter(s) (100 mL/Hr) IV Continuous <Continuous>  donepezil 10 milliGRAM(s) Oral at bedtime  glucagon  Injectable 1 milliGRAM(s) IntraMuscular once  insulin lispro (ADMELOG) corrective regimen sliding scale   SubCutaneous three times a day before meals  nystatin Powder 1 Application(s) Topical two times a day  sodium chloride 0.9%. 1000 milliLiter(s) (65 mL/Hr) IV Continuous <Continuous>    MEDICATIONS  (PRN):  acetaminophen   Tablet .. 650 milliGRAM(s) Oral every 6 hours PRN Mild Pain (1 - 3)  melatonin 3 milliGRAM(s) Oral at bedtime PRN Sleep      Vital Signs Last 24 Hrs  T(C): 36.4 (28 Nov 2020 21:15), Max: 36.8 (28 Nov 2020 05:15)  T(F): 97.5 (28 Nov 2020 21:15), Max: 98.2 (28 Nov 2020 05:15)  HR: 105 (28 Nov 2020 21:15) (68 - 105)  BP: 112/54 (28 Nov 2020 21:15) (108/79 - 126/60)  BP(mean): --  RR: 16 (28 Nov 2020 21:15) (16 - 18)  SpO2: 97% (28 Nov 2020 21:15) (96% - 97%)    PHYSICAL EXAMINATION:  GENERAL: Elderly  lady, moderately built, appears stated age, in NAD  HEAD:  Atraumatic, Normocephalic  EYES:  conjunctiva and sclera clear  NECK: Supple, No JVD  CHEST/LUNG: Clear to auscultation. No rales, rhonchi, wheezing, or rubs  HEART: Regular rate and rhythm; No murmurs, rubs, or gallops  ABDOMEN: Soft, Nontender, Nondistended; Bowel sounds present  NERVOUS SYSTEM:  Alert & Oriented X3,  no  motor or sensory loss.   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

## 2020-11-29 NOTE — DISCHARGE NOTE PROVIDER - HOSPITAL COURSE
80F with PMHx of HTN, HLD, T2DM, vascular dementia, and paroxysmal AF on Eliquis/ rate control who presents to the ED after a fall. Patient has dementia and unable to provide any history. She is baseline A0x1-2, lives at home alone, able to do most ADL's including cooking, cleaning, and paying bills. As per her daughter Ladonna Rosenberg (HCP), patient had a slip and fall as she was coming from the restroom. Patient reports bilateral knee pain but no other complaints. Patient was just discharged from [facility] 2 days ago and was admitted for pneumonia (treated with rocephin and azitho) with exacerbation. At that time, she was suggested rehab but daughter was reluctant. No LOC or head trauma reported. In the ED, afebrile, /56, , RR 17 SP02; 94%; mild wbc increase to 12  noted. EKG showed Afib with RVR; CTH: age indeterminate lacunar infarct, frontal lobe. X-ray bilateral knees showed no fracture. She was admitted for rehab evaluation.    PT evaluated and recommended that subacute rehabilitation. She had poor insight about her health and she gave consent for her daughter to make health related decisions in her stead. [facility] conversation with family resulted in full code. Given patient's improved clinical status and current hemodynamic stability, decision was made to discharge after discussing with attending physician. Please refer to patient's complete medical chart with documents for a full hospital course, for this is only a brief summary. 80F with PMHx of HTN, HLD, T2DM, vascular dementia, and paroxysmal AF on Eliquis/ rate control who presents to the ED after a fall. Patient has dementia and unable to provide any history. She is baseline A0x1-2, lives at home alone, able to do most ADL's including cooking, cleaning, and paying bills. As per her daughter Ladonna Rosenberg (HCP), patient had a slip and fall as she was coming from the restroom. Patient reports bilateral knee pain but no other complaints. [facility] and was admitted for pneumonia (treated with rocephin and azitho) with exacerbation. At that time, she was suggested rehab but daughter was reluctant. No LOC or head trauma reported. In the ED, afebrile, /56, , RR 17 SP02; 94%; mild wbc increase to 12  noted. EKG showed Afib with RVR; CTH: age indeterminate lacunar infarct, frontal lobe. X-ray bilateral knees showed no fracture. She was admitted for rehab evaluation.    PT was evaluated by PT and recommended that subacute rehabilitation. She had poor insight about her health and she gave consent for her daughter to make health related decisions in her stead. [facility] conversation with family resulted in full code. Given patient's improved clinical status and current hemodynamic stability, decision was made to discharge after discussing with attending physician. Please refer to patient's complete medical chart with documents for a full hospital course, for this is only a brief summary. 80F with PMHx of HTN, HLD, T2DM, vascular dementia, and paroxysmal AF on Eliquis/ rate control who presents to the ED after a fall. Patient has dementia and unable to provide any history. She is baseline A0x1-2, lives at home alone, able to do most ADL's including cooking, cleaning, and paying bills. As per her daughter Ladonna Rosenberg (HCP), patient had a slip and fall as she was coming from the restroom. Patient reports bilateral knee pain but no other complaints. Patient was just discharged from [facility] 2 days ago and was admitted for pneumonia (treated with rocephin and azitho) with exacerbation. At that time, she was suggested rehab but daughter was reluctant. No LOC or head trauma reported. In the ED, afebrile, /56, , RR 17 SP02; 94%; mild wbc increase to 12  noted. EKG showed Afib with RVR; CTH: age indeterminate lacunar infarct, frontal lobe. X-ray bilateral knees showed no fracture. She was admitted for rehab evaluation.    PT was evaluated by PT and recommended that subacute rehabilitation. She had poor insight about her health and she gave consent for her daughter to make health related decisions in her stead. [facility] conversation with family resulted in full code. Given patient's improved clinical status and current hemodynamic stability, decision was made to discharge after discussing with attending physician. Please refer to patient's complete medical chart with documents for a full hospital course, for this is only a brief summary. 80F with PMHx of HTN, HLD, T2DM, vascular dementia, and paroxysmal AF on Eliquis/ rate control who presents to the ED after a fall. Patient has dementia and unable to provide any history. She is baseline A0x1-2, lives at home alone, able to do most ADL's including cooking, cleaning, and paying bills. As per her daughter Ladonna Rosenberg (HCP), patient had a slip and fall as she was coming from the restroom. Patient reports bilateral knee pain but no other complaints. [facility] and was admitted for pneumonia (treated with rocephin and azitho) with exacerbation. At that time, she was suggested rehab but daughter was reluctant. No LOC or head trauma reported. In the ED, afebrile, /56, , RR 17 SP02; 94%; mild wbc increase to 12  noted. EKG showed Afib with RVR; CTH: age indeterminate lacunar infarct, frontal lobe. X-ray bilateral knees showed no fracture. She was admitted for rehab evaluation.    PT was evaluated by PT and recommended that subacute rehabilitation. She had poor insight about her health and she gave consent for her daughter to make health related decisions in her stead. [facility] conversation with family resulted in full code. Given patient's improved clinical status and current hemodynamic stability, decision was made to discharge after discussing with attending physician. During this admission, patient was discontinued from Eliquis and places on ASA and Plavix as per family preference due to high risk for falls and bleeds. Also Losartan and Amlodipine was discontinued and discharge on Metoprolol and Lasix. Antidiabetic regimen no changes made.   Please refer to patient's complete medical chart with documents for a full hospital course, for this is only a brief summary.

## 2020-11-30 NOTE — DISCHARGE NOTE NURSING/CASE MANAGEMENT/SOCIAL WORK - PATIENT PORTAL LINK FT
You can access the FollowMyHealth Patient Portal offered by [facility] by registering at the following website: [email or web address]. By joining [facility]’s FollowMyHealth portal, you will also be able to view your health information using other applications (apps) compatible with our system.

## 2020-12-16 PROBLEM — I10 HYPERTENSION: Status: ACTIVE | Noted: 2020-01-01

## 2020-12-16 PROBLEM — G31.84 MILD COGNITIVE IMPAIRMENT WITH MEMORY LOSS: Status: ACTIVE | Noted: 2020-01-01

## 2020-12-16 PROBLEM — I48.91 ATRIAL FIBRILLATION: Status: ACTIVE | Noted: 2020-01-01

## 2020-12-16 PROBLEM — N28.89 RENAL MASS: Status: ACTIVE | Noted: 2020-01-01

## 2020-12-16 PROBLEM — E11.9 DIABETES MELLITUS, TYPE 2: Status: ACTIVE | Noted: 2020-01-01

## 2020-12-16 PROBLEM — E78.00 HYPERCHOLESTEROLEMIA: Status: ACTIVE | Noted: 2020-01-01
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
## 2020-12-16 NOTE — ASSESSMENT
[FreeTextEntry1] : Very pleasant 80-year-old woman who presents for follow-up of right renal masses\par -We again had an extensive discussion regarding my concern for cancer\par -We discussed the risks of untreated cancer\par -I recommended she undergo an evaluation today given abdominal pain, however she refused examination\par -I recommended that she follow-up with Dr. Herrera for radical nephrectomy and an appointment was scheduled for her.

## 2021-01-01 ENCOUNTER — APPOINTMENT (OUTPATIENT)
Dept: NEUROLOGY | Facility: CLINIC | Age: 81
End: 2021-01-01

## 2021-01-28 ENCOUNTER — APPOINTMENT (OUTPATIENT)
Dept: UROLOGY | Facility: CLINIC | Age: 81
End: 2021-01-28

## 2021-02-01 ENCOUNTER — APPOINTMENT (OUTPATIENT)
Dept: NEUROLOGY | Facility: CLINIC | Age: 81
End: 2021-02-01

## 2022-02-17 NOTE — H&P ADULT - PROBLEM SELECTOR PLAN 1
Patient came to ED because family noted slurred speech and AMS.  NIHSS score on arrival was 0.  Patient is being admitted to telemetry for TIA.  CT head is negative for any acute bleed or stroke.  Patient does have history of A fib but not on any AC.  No neurological deficit on examination.  patient passed bedside speech and swallow.  started on aspirin and statin.  Repeat CT head in case of any change in mental status or neurological exam
normal...

## 2022-03-03 NOTE — ED PROVIDER NOTE - NS ED MD DISPO DIVISION
Implemented All Fall Risk Interventions:  [place] to call system. Call bell, personal items and telephone within reach. Instruct patient to call for assistance. Room bathroom lighting operational. Non-slip footwear when patient is off stretcher. Physically safe environment: no spills, clutter or unnecessary equipment. Stretcher in lowest position, wheels locked, appropriate side rails in place. Provide visual cue, wrist band, yellow gown, etc. Monitor gait and stability. Monitor for mental status changes and reorient to person, place, and time. Review medications for side effects contributing to fall risk. Reinforce activity limits and safety measures with patient and family.
[facility]

## 2022-05-16 NOTE — ED ADULT NURSE NOTE - ED STAT RN HANDOFF DETAILS
Lab Results   Component Value Date    HGBA1C 6 9 (H) 01/15/2022     · Ct SSI  · Diabetic diet controlled Endorsed to NAT Dyson/nina, pt stable at this time, waiting for a bed.

## 2022-08-25 NOTE — ED ADULT TRIAGE NOTE - NS ED NURSE AMBULANCES
[facility]
61 y/o female with pmhx of asthma, GERD, presents to ED c/o pleuritic cp radiating to her back x 2 days. No SOB. Denies heavy lifting. Pain is better with Tylenol and lidocaine patch. States she has had similar back pain in the past but this time it radiates to her chest. Pt sent ot cdu for r/o acs, stress test and echo.

## 2022-11-21 NOTE — DATA REVIEWED
[de-identified] : Carotid Doppler (1/31/20):\par - No hemodynamically significant carotid stenosis b/l\par \par CT Head (3/10/20):\par - No acute intracranial abnormality\par - Possible left mark infarct\par \par Labs (3/12/20):\par - CBC notable for Hct 45.4 <H>\par - BMP notable for HCO3 32 <H> & Glucose 217 <H>\par - Chronic microvascular disease\par - Mild diffuse atrophy with ventricular dilatation\par - Cavus septum pellucidum and vergae normal... well appearing and in no apparent distress.

## 2022-12-19 NOTE — ED PROVIDER NOTE - SCRIBE NAME
BMP blood test collected & sent to lab;    Pt denied having any needs at this time, no distress noted;   Yanick Burch)

## 2023-03-15 NOTE — ED ADULT TRIAGE NOTE - TEMPERATURE IN CELSIUS (DEGREES C)
36.6 Doxepin Pregnancy And Lactation Text: This medication is Pregnancy Category C and it isn't known if it is safe during pregnancy. It is also excreted in breast milk and breast feeding isn't recommended.

## 2023-05-16 NOTE — PHYSICAL THERAPY INITIAL EVALUATION ADULT - PHYSICAL ASSIST/NONPHYSICAL ASSIST: BED TO CHAIR, REHAB EVAL
verbal cues
Initiate Treatment: Doxycycline
Detail Level: Detailed
Continue Regimen: Tretinoin, tretinoin
Initiate Treatment: Ketoconazole shampoo, fluocinonide solution, hydrocortisone cream
Plan: Will plan to treat with destruction for the next few visits or until lesions have resolved.

## 2023-08-23 NOTE — ED ADULT NURSE NOTE - MODE OF DISCHARGE
[FreeTextEntry1] : This note was written by Elio Huerta on 08/23/2023, acting as a scribe for BUCKY PINZON, MONICA/L, PA 
[FreeTextEntry1] : This note was written by Elio Huerta on 08/23/2023, acting as a scribe for BUCKY PINZON, MONICA/L, PA 
Ambulatory

## 2023-08-24 NOTE — PROGRESS NOTE ADULT - ASSESSMENT
79 yo F with HTN, HLD, DM, vascular dementia, and paroxysmal AF on Eliquis who presented with dyspnea and dry cough. Pt found to have   Patchy infiltrate of the right lung base on CXR,  Troponin  x 2 negative, mildly elevated Pro BNP 2012, TTE ( from 1/31/2020) + a severely dilated left atrium, mild concentric left ventricular hypertrophy.  LVEF = 55 to 60%  Admitted for acute on chronic diastolic HF exacerbation with possible pneumonia. Received lasix 40 mg IV and started on Ceftriaxone and Azithromycin. Patient seen at bedside, alert and awake, disoriented to time, states feels better, denies difficulties breathing. NO fever or leucocytosis     Detail Level: Simple

## 2024-11-04 NOTE — PHYSICAL THERAPY INITIAL EVALUATION ADULT - DID THE PATIENT HAVE SURGERY?
Left voicemail to offer patient earlier genetic counseling appointment.  Contact information left on voicemail.    Joseline Yanes MS, [facility] [place]   Genetic Counselor   
n/a

## 2025-05-28 NOTE — ED ADULT NURSE NOTE - NURSING ED SKIN COLOR
Multidisciplinary Primary Liver Tumor and Complex Liver Disease Conference Results     Meeting Date:  5/28/25    Scan Reviewed:  MRI Abdomen 4/14/25    Discussion:  5cm LR4 lesion noted in Segment 2, suspicious for HCC. 1.9cm LR5 lesion in Segment 5.     Plan: Recommend IR follow up for liver directed therapy as planned.     
normal for race